# Patient Record
Sex: FEMALE | Race: WHITE | NOT HISPANIC OR LATINO | Employment: UNEMPLOYED
[De-identification: names, ages, dates, MRNs, and addresses within clinical notes are randomized per-mention and may not be internally consistent; named-entity substitution may affect disease eponyms.]

---

## 2021-04-01 ENCOUNTER — RECORDS - HEALTHEAST (OUTPATIENT)
Dept: ADMINISTRATIVE | Facility: OTHER | Age: 36
End: 2021-04-01

## 2021-04-01 ENCOUNTER — AMBULATORY - HEALTHEAST (OUTPATIENT)
Dept: MAMMOGRAPHY | Facility: CLINIC | Age: 36
End: 2021-04-01

## 2021-04-01 DIAGNOSIS — Z11.59 ENCOUNTER FOR SCREENING FOR OTHER VIRAL DISEASES: ICD-10-CM

## 2021-04-07 ENCOUNTER — COMMUNICATION - HEALTHEAST (OUTPATIENT)
Dept: SCHEDULING | Facility: CLINIC | Age: 36
End: 2021-04-07

## 2021-04-09 ENCOUNTER — AMBULATORY - HEALTHEAST (OUTPATIENT)
Dept: NURSING | Facility: CLINIC | Age: 36
End: 2021-04-09

## 2021-04-09 ENCOUNTER — OFFICE VISIT - HEALTHEAST (OUTPATIENT)
Dept: FAMILY MEDICINE | Facility: CLINIC | Age: 36
End: 2021-04-09

## 2021-04-09 ENCOUNTER — AMBULATORY - HEALTHEAST (OUTPATIENT)
Dept: LAB | Facility: CLINIC | Age: 36
End: 2021-04-09

## 2021-04-09 DIAGNOSIS — Z11.59 ENCOUNTER FOR SCREENING FOR OTHER VIRAL DISEASES: ICD-10-CM

## 2021-04-09 DIAGNOSIS — N63.10 BREAST MASS, RIGHT: ICD-10-CM

## 2021-04-10 LAB
SARS-COV-2 PCR COMMENT: NORMAL
SARS-COV-2 RNA SPEC QL NAA+PROBE: NEGATIVE
SARS-COV-2 VIRUS SPECIMEN SOURCE: NORMAL

## 2021-04-11 ENCOUNTER — COMMUNICATION - HEALTHEAST (OUTPATIENT)
Dept: SCHEDULING | Facility: CLINIC | Age: 36
End: 2021-04-11

## 2021-04-13 ENCOUNTER — HOSPITAL ENCOUNTER (OUTPATIENT)
Dept: MAMMOGRAPHY | Facility: CLINIC | Age: 36
Discharge: HOME | End: 2021-04-13

## 2021-04-13 ENCOUNTER — AMBULATORY - HEALTHEAST (OUTPATIENT)
Dept: SURGERY | Facility: CLINIC | Age: 36
End: 2021-04-13

## 2021-04-13 DIAGNOSIS — N63.0 BREAST MASS: ICD-10-CM

## 2021-04-15 ENCOUNTER — COMMUNICATION - HEALTHEAST (OUTPATIENT)
Dept: ONCOLOGY | Facility: CLINIC | Age: 36
End: 2021-04-15

## 2021-04-15 LAB
CAP COMMENT: ABNORMAL
LAB AP CHARGES (HE HISTORICAL CONVERSION): ABNORMAL
LAB AP CHARGES (HE HISTORICAL CONVERSION): ABNORMAL
LAB AP INITIAL CYTO EVAL (HE HISTORICAL CONVERSION): ABNORMAL
LAB MED GENERAL PATH INTERP (HE HISTORICAL CONVERSION): ABNORMAL
PATH REPORT.COMMENTS IMP SPEC: ABNORMAL
PATH REPORT.FINAL DX SPEC: ABNORMAL
PATH REPORT.FINAL DX SPEC: ABNORMAL
PATH REPORT.GROSS SPEC: ABNORMAL
PATH REPORT.MICROSCOPIC SPEC OTHER STN: ABNORMAL
PATH REPORT.RELEVANT HX SPEC: ABNORMAL
PATH REPORT.RELEVANT HX SPEC: ABNORMAL
RESULT FLAG (HE HISTORICAL CONVERSION): ABNORMAL
SPECIMEN DESCRIPTION: ABNORMAL

## 2021-04-16 ENCOUNTER — COMMUNICATION - HEALTHEAST (OUTPATIENT)
Dept: ONCOLOGY | Facility: CLINIC | Age: 36
End: 2021-04-16

## 2021-04-20 ENCOUNTER — HOSPITAL ENCOUNTER (OUTPATIENT)
Dept: SURGERY | Facility: CLINIC | Age: 36
Discharge: HOME | End: 2021-04-20
Attending: SPECIALIST

## 2021-04-20 DIAGNOSIS — Z17.0 MALIGNANT NEOPLASM OF AXILLARY TAIL OF RIGHT BREAST IN FEMALE, ESTROGEN RECEPTOR POSITIVE (H): ICD-10-CM

## 2021-04-20 DIAGNOSIS — C50.611 MALIGNANT NEOPLASM OF AXILLARY TAIL OF RIGHT BREAST IN FEMALE, ESTROGEN RECEPTOR POSITIVE (H): ICD-10-CM

## 2021-04-22 ENCOUNTER — AMBULATORY - HEALTHEAST (OUTPATIENT)
Dept: INTERVENTIONAL RADIOLOGY/VASCULAR | Facility: HOSPITAL | Age: 36
End: 2021-04-22

## 2021-04-22 ENCOUNTER — OFFICE VISIT - HEALTHEAST (OUTPATIENT)
Dept: ONCOLOGY | Facility: CLINIC | Age: 36
End: 2021-04-22

## 2021-04-22 ENCOUNTER — AMBULATORY - HEALTHEAST (OUTPATIENT)
Dept: ONCOLOGY | Facility: CLINIC | Age: 36
End: 2021-04-22

## 2021-04-22 DIAGNOSIS — Z79.899 ENCOUNTER FOR MONITORING CARDIOTOXIC DRUG THERAPY: ICD-10-CM

## 2021-04-22 DIAGNOSIS — Z17.0 MALIGNANT NEOPLASM OF UPPER-INNER QUADRANT OF RIGHT BREAST IN FEMALE, ESTROGEN RECEPTOR POSITIVE (H): ICD-10-CM

## 2021-04-22 DIAGNOSIS — C50.211 MALIGNANT NEOPLASM OF UPPER-INNER QUADRANT OF RIGHT BREAST IN FEMALE, ESTROGEN RECEPTOR POSITIVE (H): ICD-10-CM

## 2021-04-22 DIAGNOSIS — Z51.11 ENCOUNTER FOR ANTINEOPLASTIC CHEMOTHERAPY: ICD-10-CM

## 2021-04-22 DIAGNOSIS — Z11.59 ENCOUNTER FOR SCREENING FOR OTHER VIRAL DISEASES: ICD-10-CM

## 2021-04-22 DIAGNOSIS — Z51.81 ENCOUNTER FOR MONITORING CARDIOTOXIC DRUG THERAPY: ICD-10-CM

## 2021-04-22 ASSESSMENT — MIFFLIN-ST. JEOR: SCORE: 1181.65

## 2021-04-23 ENCOUNTER — HOSPITAL ENCOUNTER (OUTPATIENT)
Dept: CARDIOLOGY | Facility: HOSPITAL | Age: 36
Discharge: HOME | End: 2021-04-23
Attending: INTERNAL MEDICINE

## 2021-04-23 ASSESSMENT — MIFFLIN-ST. JEOR: SCORE: 1181.65

## 2021-04-25 ENCOUNTER — AMBULATORY - HEALTHEAST (OUTPATIENT)
Dept: FAMILY MEDICINE | Facility: CLINIC | Age: 36
End: 2021-04-25

## 2021-04-25 DIAGNOSIS — Z11.59 ENCOUNTER FOR SCREENING FOR OTHER VIRAL DISEASES: ICD-10-CM

## 2021-04-26 ENCOUNTER — COMMUNICATION - HEALTHEAST (OUTPATIENT)
Dept: ONCOLOGY | Facility: CLINIC | Age: 36
End: 2021-04-26

## 2021-04-26 ENCOUNTER — AMBULATORY - HEALTHEAST (OUTPATIENT)
Dept: ONCOLOGY | Facility: CLINIC | Age: 36
End: 2021-04-26

## 2021-04-27 ENCOUNTER — COMMUNICATION - HEALTHEAST (OUTPATIENT)
Dept: SCHEDULING | Facility: CLINIC | Age: 36
End: 2021-04-27

## 2021-04-28 ENCOUNTER — HOSPITAL ENCOUNTER (OUTPATIENT)
Dept: INTERVENTIONAL RADIOLOGY/VASCULAR | Facility: HOSPITAL | Age: 36
Setting detail: RADIATION/ONCOLOGY SERIES
Discharge: STILL ADMITTED OR ADMINISTRATIVE | End: 2021-04-28
Attending: INTERNAL MEDICINE

## 2021-04-28 DIAGNOSIS — Z51.11 ENCOUNTER FOR ANTINEOPLASTIC CHEMOTHERAPY: ICD-10-CM

## 2021-04-28 DIAGNOSIS — Z17.0 MALIGNANT NEOPLASM OF UPPER-INNER QUADRANT OF RIGHT BREAST IN FEMALE, ESTROGEN RECEPTOR POSITIVE (H): ICD-10-CM

## 2021-04-28 DIAGNOSIS — C50.211 MALIGNANT NEOPLASM OF UPPER-INNER QUADRANT OF RIGHT BREAST IN FEMALE, ESTROGEN RECEPTOR POSITIVE (H): ICD-10-CM

## 2021-04-28 ASSESSMENT — MIFFLIN-ST. JEOR: SCORE: 1186.64

## 2021-04-29 ENCOUNTER — COMMUNICATION - HEALTHEAST (OUTPATIENT)
Dept: ONCOLOGY | Facility: CLINIC | Age: 36
End: 2021-04-29

## 2021-04-30 ENCOUNTER — COMMUNICATION - HEALTHEAST (OUTPATIENT)
Dept: ONCOLOGY | Facility: CLINIC | Age: 36
End: 2021-04-30

## 2021-04-30 ENCOUNTER — AMBULATORY - HEALTHEAST (OUTPATIENT)
Dept: NURSING | Facility: CLINIC | Age: 36
End: 2021-04-30

## 2021-05-05 ENCOUNTER — COMMUNICATION - HEALTHEAST (OUTPATIENT)
Dept: ONCOLOGY | Facility: CLINIC | Age: 36
End: 2021-05-05

## 2021-05-07 ENCOUNTER — COMMUNICATION - HEALTHEAST (OUTPATIENT)
Dept: ONCOLOGY | Facility: CLINIC | Age: 36
End: 2021-05-07

## 2021-05-12 ENCOUNTER — COMMUNICATION - HEALTHEAST (OUTPATIENT)
Dept: SURGERY | Facility: CLINIC | Age: 36
End: 2021-05-12

## 2021-05-13 ENCOUNTER — RECORDS - HEALTHEAST (OUTPATIENT)
Dept: ADMINISTRATIVE | Facility: OTHER | Age: 36
End: 2021-05-13

## 2021-05-13 ENCOUNTER — OFFICE VISIT - HEALTHEAST (OUTPATIENT)
Dept: ONCOLOGY | Facility: CLINIC | Age: 36
End: 2021-05-13

## 2021-05-13 ENCOUNTER — AMBULATORY - HEALTHEAST (OUTPATIENT)
Dept: INFUSION THERAPY | Facility: CLINIC | Age: 36
End: 2021-05-13

## 2021-05-13 ENCOUNTER — INFUSION - HEALTHEAST (OUTPATIENT)
Dept: INFUSION THERAPY | Facility: CLINIC | Age: 36
End: 2021-05-13

## 2021-05-13 DIAGNOSIS — C50.211 MALIGNANT NEOPLASM OF UPPER-INNER QUADRANT OF RIGHT BREAST IN FEMALE, ESTROGEN RECEPTOR POSITIVE (H): ICD-10-CM

## 2021-05-13 DIAGNOSIS — Z17.0 MALIGNANT NEOPLASM OF UPPER-INNER QUADRANT OF RIGHT BREAST IN FEMALE, ESTROGEN RECEPTOR POSITIVE (H): ICD-10-CM

## 2021-05-13 DIAGNOSIS — Z51.11 ENCOUNTER FOR ANTINEOPLASTIC CHEMOTHERAPY: ICD-10-CM

## 2021-05-13 LAB
ALBUMIN SERPL-MCNC: 4.3 G/DL (ref 3.5–5)
ALP SERPL-CCNC: 59 U/L (ref 45–120)
ALT SERPL W P-5'-P-CCNC: 15 U/L (ref 0–45)
ANION GAP SERPL CALCULATED.3IONS-SCNC: 9 MMOL/L (ref 5–18)
AST SERPL W P-5'-P-CCNC: 14 U/L (ref 0–40)
BASOPHILS # BLD AUTO: 0 THOU/UL (ref 0–0.2)
BASOPHILS NFR BLD AUTO: 0 % (ref 0–2)
BILIRUB SERPL-MCNC: 0.4 MG/DL (ref 0–1)
BUN SERPL-MCNC: 12 MG/DL (ref 8–22)
CALCIUM SERPL-MCNC: 9.4 MG/DL (ref 8.5–10.5)
CHLORIDE BLD-SCNC: 106 MMOL/L (ref 98–107)
CO2 SERPL-SCNC: 23 MMOL/L (ref 22–31)
CREAT SERPL-MCNC: 0.71 MG/DL (ref 0.6–1.1)
EOSINOPHIL # BLD AUTO: 0 THOU/UL (ref 0–0.4)
EOSINOPHIL NFR BLD AUTO: 0 % (ref 0–6)
ERYTHROCYTE [DISTWIDTH] IN BLOOD BY AUTOMATED COUNT: 12 % (ref 11–14.5)
GFR SERPL CREATININE-BSD FRML MDRD: >60 ML/MIN/1.73M2
GLUCOSE BLD-MCNC: 173 MG/DL (ref 70–125)
HCT VFR BLD AUTO: 40.6 % (ref 35–47)
HGB BLD-MCNC: 13.7 G/DL (ref 12–16)
IMM GRANULOCYTES # BLD: 0 THOU/UL
IMM GRANULOCYTES NFR BLD: 0 %
LYMPHOCYTES # BLD AUTO: 0.9 THOU/UL (ref 0.8–4.4)
LYMPHOCYTES NFR BLD AUTO: 19 % (ref 20–40)
MCH RBC QN AUTO: 29.5 PG (ref 27–34)
MCHC RBC AUTO-ENTMCNC: 33.7 G/DL (ref 32–36)
MCV RBC AUTO: 87 FL (ref 80–100)
MONOCYTES # BLD AUTO: 0.1 THOU/UL (ref 0–0.9)
MONOCYTES NFR BLD AUTO: 1 % (ref 2–10)
NEUTROPHILS # BLD AUTO: 3.9 THOU/UL (ref 2–7.7)
NEUTROPHILS NFR BLD AUTO: 79 % (ref 50–70)
PLATELET # BLD AUTO: 282 THOU/UL (ref 140–440)
PMV BLD AUTO: 11.2 FL (ref 8.5–12.5)
POTASSIUM BLD-SCNC: 3.5 MMOL/L (ref 3.5–5)
PROT SERPL-MCNC: 7.8 G/DL (ref 6–8)
RBC # BLD AUTO: 4.65 MILL/UL (ref 3.8–5.4)
SODIUM SERPL-SCNC: 138 MMOL/L (ref 136–145)
WBC: 4.9 THOU/UL (ref 4–11)

## 2021-05-22 ENCOUNTER — OFFICE VISIT - HEALTHEAST (OUTPATIENT)
Dept: FAMILY MEDICINE | Facility: CLINIC | Age: 36
End: 2021-05-22

## 2021-05-22 ENCOUNTER — COMMUNICATION - HEALTHEAST (OUTPATIENT)
Dept: SCHEDULING | Facility: CLINIC | Age: 36
End: 2021-05-22

## 2021-05-22 DIAGNOSIS — J02.9 SORE THROAT: ICD-10-CM

## 2021-05-22 LAB
DEPRECATED S PYO AG THROAT QL EIA: NORMAL
GROUP A STREP BY PCR: NORMAL
SARS-COV-2 PCR COMMENT: NORMAL
SARS-COV-2 RNA SPEC QL NAA+PROBE: NEGATIVE
SARS-COV-2 VIRUS SPECIMEN SOURCE: NORMAL

## 2021-05-23 ENCOUNTER — COMMUNICATION - HEALTHEAST (OUTPATIENT)
Dept: SCHEDULING | Facility: CLINIC | Age: 36
End: 2021-05-23

## 2021-05-25 ENCOUNTER — AMBULATORY - HEALTHEAST (OUTPATIENT)
Dept: CARE COORDINATION | Facility: CLINIC | Age: 36
End: 2021-05-25

## 2021-05-27 VITALS — WEIGHT: 124.4 LBS

## 2021-05-27 VITALS
OXYGEN SATURATION: 98 % | TEMPERATURE: 98.6 F | SYSTOLIC BLOOD PRESSURE: 137 MMHG | DIASTOLIC BLOOD PRESSURE: 75 MMHG | HEART RATE: 119 BPM

## 2021-06-01 ENCOUNTER — COMMUNICATION - HEALTHEAST (OUTPATIENT)
Dept: ONCOLOGY | Facility: CLINIC | Age: 36
End: 2021-06-01

## 2021-06-03 ENCOUNTER — INFUSION - HEALTHEAST (OUTPATIENT)
Dept: INFUSION THERAPY | Facility: CLINIC | Age: 36
End: 2021-06-03

## 2021-06-03 ENCOUNTER — AMBULATORY - HEALTHEAST (OUTPATIENT)
Dept: INFUSION THERAPY | Facility: CLINIC | Age: 36
End: 2021-06-03

## 2021-06-03 ENCOUNTER — OFFICE VISIT - HEALTHEAST (OUTPATIENT)
Dept: ONCOLOGY | Facility: CLINIC | Age: 36
End: 2021-06-03

## 2021-06-03 DIAGNOSIS — C50.011 MALIGNANT NEOPLASM OF NIPPLE OF RIGHT BREAST IN FEMALE, ESTROGEN RECEPTOR POSITIVE (H): ICD-10-CM

## 2021-06-03 DIAGNOSIS — Z51.11 ENCOUNTER FOR ANTINEOPLASTIC CHEMOTHERAPY: ICD-10-CM

## 2021-06-03 DIAGNOSIS — K52.89 NEUTROPENIC COLITIS (H): ICD-10-CM

## 2021-06-03 DIAGNOSIS — Z17.0 MALIGNANT NEOPLASM OF NIPPLE OF RIGHT BREAST IN FEMALE, ESTROGEN RECEPTOR POSITIVE (H): ICD-10-CM

## 2021-06-03 DIAGNOSIS — T45.1X5A ALOPECIA DUE TO CYTOTOXIC DRUG: ICD-10-CM

## 2021-06-03 DIAGNOSIS — L65.8 ALOPECIA DUE TO CYTOTOXIC DRUG: ICD-10-CM

## 2021-06-03 DIAGNOSIS — D70.9 NEUTROPENIC COLITIS (H): ICD-10-CM

## 2021-06-03 LAB
ALBUMIN SERPL-MCNC: 3.6 G/DL (ref 3.5–5)
ALP SERPL-CCNC: 66 U/L (ref 45–120)
ALT SERPL W P-5'-P-CCNC: 21 U/L (ref 0–45)
ANION GAP SERPL CALCULATED.3IONS-SCNC: 10 MMOL/L (ref 5–18)
AST SERPL W P-5'-P-CCNC: 13 U/L (ref 0–40)
BASOPHILS # BLD AUTO: 0 THOU/UL (ref 0–0.2)
BASOPHILS NFR BLD AUTO: 0 % (ref 0–2)
BILIRUB SERPL-MCNC: 0.3 MG/DL (ref 0–1)
BUN SERPL-MCNC: 10 MG/DL (ref 8–22)
CALCIUM SERPL-MCNC: 9.4 MG/DL (ref 8.5–10.5)
CHLORIDE BLD-SCNC: 103 MMOL/L (ref 98–107)
CO2 SERPL-SCNC: 23 MMOL/L (ref 22–31)
CREAT SERPL-MCNC: 0.67 MG/DL (ref 0.6–1.1)
EOSINOPHIL # BLD AUTO: 0 THOU/UL (ref 0–0.4)
EOSINOPHIL NFR BLD AUTO: 0 % (ref 0–6)
ERYTHROCYTE [DISTWIDTH] IN BLOOD BY AUTOMATED COUNT: 13 % (ref 11–14.5)
GFR SERPL CREATININE-BSD FRML MDRD: >60 ML/MIN/1.73M2
GLUCOSE BLD-MCNC: 185 MG/DL (ref 70–125)
HCT VFR BLD AUTO: 35 % (ref 35–47)
HGB BLD-MCNC: 11.6 G/DL (ref 12–16)
IMM GRANULOCYTES # BLD: 0.1 THOU/UL
IMM GRANULOCYTES NFR BLD: 1 %
LYMPHOCYTES # BLD AUTO: 0.9 THOU/UL (ref 0.8–4.4)
LYMPHOCYTES NFR BLD AUTO: 9 % (ref 20–40)
MCH RBC QN AUTO: 29.7 PG (ref 27–34)
MCHC RBC AUTO-ENTMCNC: 33.1 G/DL (ref 32–36)
MCV RBC AUTO: 90 FL (ref 80–100)
MONOCYTES # BLD AUTO: 0.1 THOU/UL (ref 0–0.9)
MONOCYTES NFR BLD AUTO: 1 % (ref 2–10)
NEUTROPHILS # BLD AUTO: 8.9 THOU/UL (ref 2–7.7)
NEUTROPHILS NFR BLD AUTO: 90 % (ref 50–70)
PLATELET # BLD AUTO: 365 THOU/UL (ref 140–440)
PMV BLD AUTO: 10 FL (ref 8.5–12.5)
POTASSIUM BLD-SCNC: 4.1 MMOL/L (ref 3.5–5)
PROT SERPL-MCNC: 7.4 G/DL (ref 6–8)
RBC # BLD AUTO: 3.91 MILL/UL (ref 3.8–5.4)
SODIUM SERPL-SCNC: 136 MMOL/L (ref 136–145)
WBC: 9.9 THOU/UL (ref 4–11)

## 2021-06-05 VITALS — HEIGHT: 60 IN | BODY MASS INDEX: 25.11 KG/M2 | WEIGHT: 127.9 LBS

## 2021-06-05 VITALS — BODY MASS INDEX: 25.32 KG/M2 | WEIGHT: 129 LBS | HEIGHT: 60 IN

## 2021-06-05 VITALS — WEIGHT: 133 LBS

## 2021-06-08 ENCOUNTER — COMMUNICATION - HEALTHEAST (OUTPATIENT)
Dept: ONCOLOGY | Facility: CLINIC | Age: 36
End: 2021-06-08

## 2021-06-16 ENCOUNTER — COMMUNICATION - HEALTHEAST (OUTPATIENT)
Dept: ONCOLOGY | Facility: CLINIC | Age: 36
End: 2021-06-16

## 2021-06-16 DIAGNOSIS — K76.9 LIVER LESION: ICD-10-CM

## 2021-06-16 PROBLEM — R50.9 FEVER AND CHILLS: Status: ACTIVE | Noted: 2021-05-22

## 2021-06-16 PROBLEM — N63.10 BREAST MASS, RIGHT: Status: ACTIVE | Noted: 2021-04-09

## 2021-06-16 PROBLEM — D70.9 NEUTROPENIC SEPSIS (H): Status: ACTIVE | Noted: 2021-05-23

## 2021-06-16 PROBLEM — A41.9 NEUTROPENIC SEPSIS (H): Status: ACTIVE | Noted: 2021-05-23

## 2021-06-16 PROBLEM — Z51.11 ENCOUNTER FOR ANTINEOPLASTIC CHEMOTHERAPY: Status: ACTIVE | Noted: 2021-04-22

## 2021-06-16 NOTE — PROGRESS NOTES
Per Breast Radiologist request, scheduled patient to see Dr. Perdomo  for a surgical consult on 4-20-21 at 3:20 pm at the [facility] .

## 2021-06-16 NOTE — PROGRESS NOTES
This is a 36 y.o.  female who I'm asked to see by Ye Lira MD for evaluation of a right breast cancer.  This was picked up by the patient.  She was referred for mammogram and ultrasound.  A needle biopsy was done which shows an invasive ductal carcinoma.  It is estrogen receptor positive, progesterone receptor positive and HER-2 positive.  An axillary lymph node was also biopsied to be positive.    She has no significant family history of breast cancer.      PAST MEDICAL HISTORY:  No past medical history on file.  Past Surgical History:   Procedure Laterality Date      SECTION      x two     US BIOPSY FINE NEEDLE ASPIRATION LYMPH NODE (BREAST) RIGHT Right 2021     US BREAST CORE BIOPSY RIGHT Right 2021       Medications:  No current outpatient medications on file.    Allergies:  No Known Allergies    Social History:   reports that she has never smoked. She has never used smokeless tobacco. She reports that she does not drink alcohol.   The patient is a recent immigrant from [place].    ROS:  A 12 point comprehensive review of systems was negative except as noted.    Physical Exam  Resp 16   Wt 133 lb (60.3 kg)   Breastfeeding No     General:alert, appears stated age and cooperative  Lungs:clear to auscultation bilaterally  CV:regular rate and rhythm, S1, S2 normal, no murmur, click, rub or gallop  Breasts: Clearly palpable mass in the lateral aspect of the right breast, just underneath the skin.  The skin itself even is a little bit puckered over it so may be involved.  Measures at least 2 cm in size.  No other palpable masses.  Lymph Nodes: Clearly palpable lymph node in the right axilla.  Does not feel matted.  Neuro:Grossly normal  Musculoskeletal: Normal range of motion of her upper extremities.  Skin: Normal skin turgor.    Reviewed her mammograms and ultrasound and pathology.     Impression: Right Breast Cancer. Clinically T2, N1.  Discussed the surgical options for treatment of  breast cancer which generally are a lumpectomy (partial mastectomy) combined with radiation versus a mastectomy.  Explained that the survival benefit is the same for both.  The difference is in local recurrence risk.  The patient is a Reasonable candidate for a lumpectomy.  However, because this is HER-2 positive, I think that she should do neoadjuvant chemotherapy.  Particularly since she has a positive lymph node.  I would recommend that we place a port so that she can start chemotherapy.  We will then discuss surgery again after we see how she responds to the chemotherapy.  She may be a candidate for sentinel lymph node biopsy if she has a good response to the chemotherapy.  The complicating factor in this patient's case is the fact that she is not currently a resident of the [place].  She is applying for asylum.  The social workers are working on getting her emergency medical assistance.  She was very disappointed to hear that this is a fairly long treatment plan.  She was hoping to be able to just have surgery and go home.  I explained that without chemotherapy, her risk of recurrence is exceedingly high.      Plan:   She has an appointment with Dr. Sanchez later this week.  We will make sure she agrees with this plan.  She then needs a port placed which I would recommend just be placed in radiology.  Should be able to be done much more expeditiously that way.  She would then follow-up with me after about the fourth dose of chemotherapy so that we can make a surgical plan.  If we can get this tumor to shrink down, she then may be a very good candidate for a lumpectomy and then we could potentially do a sentinel lymph node biopsy.

## 2021-06-16 NOTE — PROGRESS NOTES
Joanne presents to [facility] for a surgical consult with Dr. Perdomo  regarding her newly diagnosed right breast triple positive cancer.  She is accompanied by her friend, who speaks Greenlandic, and a telephone  for consult. Patient said she was here visiting and discovered the lump.  She is a , she has two children, daughters ages 12 and 9, who are back at home in her native country, staying with her sister. RN assessment and EMR update. Resp 16   Wt 133 lb (60.3 kg)   Breastfeeding No   Patient given a Breast Cancer Packet, contents reviewed, including information on Her2 + breast cancers.  Her friend, Yumiko, offered to translate this written material for her.  She met with Dr. Perdomo  see dictation for details of visit. She will plan to meet with Dr. Sanchez on 4-22-21.  Support provided, invited calls.  RN time 20 mins.

## 2021-06-16 NOTE — TELEPHONE ENCOUNTER
Records in Epic Chart Review / Radiology images in Nil.    Notes  4/20/2021 - Patient scheduled for Consult with Dr. Perdomo.  4/9/2021 - Progress Notes / Family Medicine, Dr. Ye Lira.  4/1/2021 - ED Provider Notes / Breast mass, Yanira Johnson PA-C.    Labs  4/13/2021 - Surgical Pathology / Right Breast Core Biopsy.  4/13/2021 - Medical Cytology / FNA Lymph Node.  4/1/2021 - ED labs.    Imaging  4/13/2021 - Mammo Post Clip Placement Right.  4/13/2021 - US Right Breast Core Biopsy.  4/13/2021 - US Right Breast FNA Lymph Node.   4/13/2021 - Mammo Diagnostic Bilateral.  4/1/2021 - US Right Breast Limited 1-3 Quadrants.

## 2021-06-16 NOTE — TELEPHONE ENCOUNTER
"New Patient Oncology Nurse Navigator Note     Referring provider: Dr. Perdomo (Verbal Order from LINA Arce RN)     Referring Clinic/Organization: [facility]     Referred to (specialty): Medical Oncology    Requested provider (if applicable): N/A     Date Referral Received: 4/15/2021     Evaluation for : Right Breast Cancer     Clinical History (per Nurse review of records provided):    -Reported to the ER on 4/1 with a 5 day history painful right breast mass. US was completed while at ER and showed right breast mass and righr axillary LN suspicious for malignancy. She was referred for mammogram and biopsy as outpatient which was completed on 4/13/2021 at [facility]. Mammogram and US showed,\"Mass the right breast is highly suspicious for malignancy with abnormal-appearing lymph node in the right axilla suspicious for metastatic disease. Ultrasound-guided core biopsy is recommended. \"  -Biopsy of the right breast mass completed on 4/13 and pathology revealed  IDC, ER/DC HER-2+ (triple positive) and Right axillary LN was positive for metastatic carcinoma, compatible with breast primary.   -She is scheduled at the [facility] on 4/20/2021 for surgical consult with Dr. Perdomo.  -Writer spoke with Breast Center Staff about wishes for medical oncology consult in light of the Her-2 positive tumor biology and this was recommended. Medical Oncology Consult was scheduled with Dr. Sanchez on 4/22/2021.  -She did establish care with PCP, Dr. Lira, at [facility], on 4/9/2021  -Joanne is from [place] and speaks Syriac. She [place] the [place] staying with family for the last few weeks. She reports that she does not have a previous cancer history and shared that she does not have any other significant medical history. Other medical records are out of the country.      Clinical Assessment / Barriers to Care (Per Nurse):   Language-Speaks Setswana and " needs  services. Has english speaking friend that is a contact for her. Also has English speaking family.  Insurance-Has only been in the [place] for a short time. Have sent a message to  MADDIE to help collaborate on  case for guidance and anricipated extra needed support      Records Location (Care Everywhere, Media, etc.): [facility]  Other medical records are not in this country     Records Needed: see above     Additional testing needed prior to consult: Surgical Consult on 4/20    Called Joanne with the assistance of Georgian  [phone] to follow up and schedule new medical oncology consult as ordered by Dr. Perdomo (verbal order via nurseCristy L.) Writer called Joanne to review her breast and LN biopsy results and shared rationale for medical oncology consult. See separate note for pathology call. Appointment with Dr. Sanchez  was scheduled on Thursday 4/22/2021 , check in at 1030 at [facility]. Patient will arrive wearing a face covering and voiced understanding of the visitor restriction in place due to the pandemic. She understands that she may have one person come to clinic with her and that she will also have an . New patient letter/map with appointment details, health history form to complete sent to mailing address in Epic. She has an English speaking friend that will help her complete her paperwork. [place] in the [place] a few [place] and noted the breast mass while here. She has family here and is staying with them. Nurse navigator role was introduced. She has writer's contact information for future correspondence. We plan to review supportive resources after she is at clinic for oncology consult.

## 2021-06-16 NOTE — PROGRESS NOTES
[facility] Hematology and Oncology Consult Note    Patient: Joanne Colon  MRN: [id number]  Date of Service: 04/22/2021        Reason for Visit    I was consulted by Dr. Lira regarding right breast cancer    Assessment/Plan    #. cT2 N1 M0 invasive ductal carcinoma, grade 3, ER positive, RI positive, HER-2 positive.   I reviewed the imaging, pathology results and informed the patient that she has locally advanced breast cancer.  I discussed about treatment goal of cure with high risk of relapse.  I discussed the indication of chemotherapy along with HER-2 directed agent.  I agree with neoadjuvant approach that she can be a candidate for breast conserving surgery.  In addition to that, we can assess the response to chemotherapy and potential modification of adjuvant treatment option.  I discussed the overall schema with neoadjuvant chemotherapy (docetaxel, carboplatin, trastuzumab,pertuzumab (every 3 weeks for 6 cycles, followed by surgery.  Then to continue trastuzumab/Pertuzumab to complete 1 year if she achieves complete response or modify to TDM 1 if there is residual disease.  She will need adjuvant radiation and endocrine therapy after the surgery.  She is willing to consider neoadjuvant chemotherapy.  I reviewed the side effects of each drug and gave her information to review.   She will need a port placement.  Requested interventional radiology for port placement.   In the meantime, she will be working with our  and financial counselor emergency medical assistance.  I have communicated with them as well.  To my knowledge, I will have to submit the care plan and initiate the treatment for EMA care plan.     We will plan to start chemotherapy as soon as we can.      #. Social Determinants of Health/Other Risk Factors:  comorbidities affecting current problem were discussed specifically lack of health insurance, language barrier and early relapse.    ECOG Performance   ECOG Performance Status:  0  Distress Assessment  Distress Assessment Score: 3        Problem List    1. Malignant neoplasm of upper-inner quadrant of right breast in female, estrogen receptor positive (H)  IR Port Placement 5+ Years    Education (Chemo Class)    dexAMETHasone (DECADRON) 4 MG tablet    prochlorperazine (COMPAZINE) 10 MG tablet    Echo Complete   2. Encounter for antineoplastic chemotherapy  IR Port Placement 5+ Years    Education (Chemo Class)    dexAMETHasone (DECADRON) 4 MG tablet    prochlorperazine (COMPAZINE) 10 MG tablet   3. Encounter for monitoring cardiotoxic drug therapy  Echo Complete           CC: Ye Lira MD      ______________________________________________________________________________      Staging History    Cancer Staging  Malignant neoplasm of upper-inner quadrant of right breast in female, estrogen receptor positive (H)  Staging form: Breast, AJCC 8th Edition  - Clinical stage from 4/21/2021: Stage IB (cT2, cN1(f), cM0, G3, ER+, AK+, HER2+) - Signed by Chelly Sanchez MD on 4/24/2021      History    Ms. Joanne Colon is a very pleasant 36 y.o. female presented today accompanied by her friend, Carey. She speaks Vietnamese. She declined  and she requests her friend to interpret for her.     She reported noticing a mass in her right breast along with pain for about 3 months or so.  Denies nipple changes or discharge.  Underwent diagnostic mammogram and ultrasound on 4/13/2021 and it confirmed right breast mass in the 8 o'clock position, 5 cm from the nipple of 2.2 x 2.8 x 1.4 cm with abnormal appearing right axillary lymph node of 1.7 cm with cortical thickness.  She underwent ultrasound-guided core needle biopsy on the same day and it showed invasive ductal carcinoma, grade 3, ER moderate to strong positive, AK strongly positive, HER-2 positive by IHC.  She met with Dr. Perdomo from surgery and recommended to consider neoadjuvant chemotherapy to HER-2 positive lymph node positive  disease.    She is otherwise very healthy.  No prior exposure to radiation or chemotherapy.  No chronic medical illness.  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  She is .  Her   about 8 years ago at the age of 30 from a heart attack.  She has 2 children of 12 years old and 9 years old.  She just came to [place] from [place] and seeking asylum here.  Her children are currently with her sister in [place].  She lives with her another sister here.    Never smoker.  She does not drink alcohol.    Menarche at age 14.  LMP 2021.  G3, P2.  Age at first pregnancy was 23.    No known family history of cancer.    Past History  History reviewed. No pertinent past medical history.  Past Surgical History:   Procedure Laterality Date      SECTION      x two     US BIOPSY FINE NEEDLE ASPIRATION LYMPH NODE (BREAST) RIGHT Right 2021     US BREAST CORE BIOPSY RIGHT Right 2021     Family History   Problem Relation Age of Onset     Diabetes type II Mother      Diabetes type II Father      Breast cancer Neg Hx      Social History     Socioeconomic History     Marital status:      Spouse name: None     Number of children: 2     Years of education: None     Highest education level: None   Occupational History     Occupation: Not working   Social Needs     Financial resource strain: None     Food insecurity     Worry: None     Inability: None     Transportation needs     Medical: None     Non-medical: None   Tobacco Use     Smoking status: Never Smoker     Smokeless tobacco: Never Used   Substance and Sexual Activity     Alcohol use: Not Currently     Frequency: Never     Binge frequency: Never     Drug use: Not Currently     Sexual activity: Not Currently   Lifestyle     Physical activity     Days per week: None     Minutes per session: None     Stress: None   Relationships     Social connections     Talks on phone: None     Gets together: None     Attends Protestant service: None     Active  member of club or organization: None     Attends meetings of clubs or organizations: None     Relationship status: None     Intimate partner violence     Fear of current or ex partner: None     Emotionally abused: None     Physically abused: None     Forced sexual activity: None   Other Topics Concern     None   Social History Narrative    Diet-        Exercise-        [place]     Allergies    No Known Allergies    Review of Systems    General  General (WDL): All general elements are within defined limits  ENT  ENT (WDL): All ENT elements are within defined limits  Respiratory  Respiratory (WDL): All respiratory elements are within defined limits  Cardiovascular  Cardiovascular (WDL): All cardiovascular elements are within defined limits  Endocrine  Endocrine (WDL): All endocrine elements are within defined limits  Gastrointestinal  Gastrointestinal (WDL): All gastrointestinal elements are within defined limits  Musculoskeletal  Musculoskeletal (WDL): All musculoskeletal elements are within defined limits  Neurological  Neurological (WDL): All neurological elements are within defined limits  Dominant Hand: Right  Psychological/Emotional  Psychological/Emotional (WDL): All psychological/emotional elements are within defined limits  Hematological/Lymphatic  Hematological/Lymphatic (WDL): All hematological/lymphatic elements are within defined limits  Dermatological  Dermatologic (WDL): All dermatological elements are within defined limits  Genitourinary/Reproductive  Genitourinary/Reproductive (WDL): All genitourinary/reproductive elements are within defined limits  Reproductive (Females only)  Menstrual irritation or increase in discharge: No  Age at start of periods: 14   Last menstural cycle: 3/13/21  Number of pregnancies: 3  Age at first pregnancy: 23  Patient Pregnant?: No  Is the patient trying to get pregnant?: No  Is the patient on birth control: No  Pain  Currently in Pain: Yes  Pain Frequency:  "Intermittent  Location: R breast  Pain Characteristics : Other (Comment)(\"like needles poking\")  Pain Intervention(s): Repositioned  Response to Interventions: improved    Physical Exam    Recent Vitals 4/23/2021   Height 5' 0\"   Weight 127 lbs 14 oz   BSA (m2) 1.57 m2   /74   Pulse 76   Temp -   Temp src -   SpO2 -   Some recent data might be hidden       General: alert, awake, not in acute distress  HEENT: Head: Normal, normocephalic, atraumatic.  Eye: Normal external eye, conjunctiva, lids cornea, LISSETH.  Nose: Normal external nose, mucus membranes and septum.  Pharynx: Normal buccal mucosa. Normal pharynx.  Neck / Thyroid: Supple, no masses, nodes, nodules or enlargement.  Lymphatics: No abnormally enlarged lymph nodes.  Chest: Normal chest wall and respirations. Clear to auscultation.  Breasts: about 4.5 cm fixed firm mass in the UOQ of the right breast.  Heart: S1 S2 RRR, no murmur.   Abdomen: abdomen is soft without significant tenderness, masses, organomegaly or guarding  Extremities: normal strength, tone, and muscle mass  Skin: normal. no rash or abnormalities  CNS: non focal.    Lab Results    Recent Results (from the past 168 hour(s))   Echo Complete   Result Value Ref Range    LV volume diastolic 51.5 46.0 - 106.0 cm3    LV volume systolic 17.4 14.0 - 42.0 cm3    IVSd 0.639 0.6 - 0.9 cm    LVIDd 4.06 3.8 - 5.2 cm    LVIDs 2.76 2.2 - 3.5 cm    LVOT diam 1.8 cm    LVOT mean gradient 2 mmHg    LVOT peak VTI 19.6 cm    LVOT mean gulshan 59.5 cm/s    LVOT peak gulshan 93.8 cm/s    LVOT peak gradient 4 mmHg    LV PWd 0.671 0.6 - 0.9 cm    MV E' lat gulshan 17.5 cm/s    MV E' med gulshan 11.1 cm/s    AV mean gulshan 73.7 cm/s    AV mean gradient 3 mmHg    AV VTI 24 cm    AV peak gulshan 117 cm/s    AO root 2.4 cm    AO ascending 2.4 cm    LA size 2.8 cm    LA length 4.4 cm    MV decel slope 4,530 mm/s2    MV decel time 173 ms    MV P 1/2 time 51 ms    MV peak A gulshan 47.1 cm/s    MV peak E gulshan 78.1 cm/s    PV accel time 155 ms    " LA area 2 11.2 cm2    LA area 1 12.4 cm2    IVS/PW ratio 1.0     LV FS 32.0 28.0 - 44.0 %    Echo LVEF calculated 66 55 - 75 %    LV mass 73.8 g    AV area 2.1 cm2    MV area p 1/2 time 4.3 cm2    LVOT area 2.54 cm2    LVOT SV 49.9 cm3    AV DIM IND VTI 0.8     BSA 1.57 m2    Hieght 60 in    Weight 2,046.4 lbs    /74 mmHg    HR 76 bpm    IVC proximal 1.4 cm    LA volume 26.8 mL    RVIDd 2.5 cm    AV DIM IND gulshan 0.8     MV E/A Ratio 1.7     AV peak gradient 5.5 mmHg    LV systolic volume index 11.1 8.0 - 24.0 cm3/m2    LV diastolic volume index 32.8 29.0 - 61.0 cm3/m2    LA volume index 17.1 mL/m2    LV mass index 47.0 g/m2    LV SVi 31.8 ml/m2    TAPSE 1.9 cm    MV med E/e' ratio 7.0     MV lat E/e' ratio 4.5     LV CO 3.8 l/min    LV Ci 2.4 l/min/m2    PV decel time 155.0 msec    Height 60.0 in    Weight 128 lbs    MV Avg E/e' Ratio 5.5 cm/s       Imaging Results    Echo Complete    Result Date: 4/23/2021  1. Normal left ventricular size and systolic performance with a visually estimated ejection fraction of 65%. 2. No significant valvular heart disease is identified on this study. 3. Normal right ventricular size and systolic performance.     Mammo Diagnostic Bilateral    Result Date: 4/13/2021  EXAM: MAMMO DIAGNOSTIC 3D BILATERAL, US BREAST BILATERAL LIMITED 1-3 QUADRANTS LOCATION: [facility] SERVICES  [facility] DATE/TIME: 4/13/2021 1:32 PM INDICATION: Lump in the right breast. COMPARISON: 4/1/2021. MAMMOGRAPHIC FINDINGS: Bilateral full-field digital diagnostic mammograms performed. The breasts are heterogeneously dense, which may obscure small masses. Images evaluated with the assistance of CAD. Breast tomosynthesis was used in interpretation. Multiple partially circumscribed and partially obscured masses are seen in both breasts. A few scattered benign-appearing round calcifications are also seen in the breasts. A mass with spiculated margins is seen just deep to the BB in the right breast in   the 9:00 position. It is suspicious for malignancy and correlates with the previously identified mass seen on ultrasound. ULTRASOUND FINDINGS: Targeted bilateral breast ultrasound was performed by both the ultrasonographer and the radiologist. In the right breast in the 9:00 position 5 cm from the nipple there was a hypoechoic heterogeneous mass with microlobulated margins,  correlating with the palpable abnormality and the mass seen in the right breast on mammography, and highly suspicious for malignancy. It measured 2.2 x 2.8 x 1.4 cm. An abnormal-appearing lymph node was also seen in the right axilla measuring 1.7 x 0.8 x 2.3 cm. The cortex appeared thickened to approximately 8 mm in thickness. Innumerable benign cysts were seen in the upper outer quadrants of both breasts. The largest in the left measured 2.9 cm in greatest dimension.     1.  Mass the right breast is highly suspicious for malignancy with abnormal-appearing lymph node in the right axilla suspicious for metastatic disease. Ultrasound-guided core biopsy is recommended. The findings and the recommendations were discussed with  the patient through an  and a friend she had who also served as an . The patient stated that she preferred to have her friend do interpreting during the biopsy. This was confirmed by the professional . Ultrasound-guided core biopsy of the breast mass and fine-needle aspiration of the right axillary lymph node was performed on the same day. 2.  Multiple benign cysts in both breasts. ACR BI-RADS Category 5: Highly Suggestive of Malignancy. Ultrasound-guided biopsy of the right breast mass and fine-needle aspiration of the right axillary lymph node was performed on the same day.    Mammo Post Clip Placement Right    Addendum Date: 4/16/2021    Pathology shows invasive ductal carcinoma. This is consistent with imaging findings. A verbal report was given to the patient. Surgical consultation is  recommended.    Result Date: 4/13/2021  US Breast Core Biopsy Right Mammo Post Clip Placement Right INDICATION: Right breast mass. PROCEDURE: Informed consent was obtained from the patient. Ultrasound was used to localize the mass at the 9 o'clock position of the right breast, 5 cm from the nipple.  The skin was marked, then prepped and draped in a sterile fashion. 1% lidocaine was used for local anesthesia. Under direct sonographic guidance, a 14-gauge coaxial needle was used to obtain 3 core biopsies.  A biopsy marking clip was then placed.  Digital mammograms performed in a separate room, using separate equipment, to document clip placement. The mammogram showed the clip to be in good position. The patient tolerated this well; there are no immediate complications. IMPRESSION: 1. Ultrasound-guided biopsy of mass in the right breast. Pathology pending. 2. Post procedure mammogram for marker placement    Us Breast Core Biopsy Right    Addendum Date: 4/16/2021    Pathology shows invasive ductal carcinoma. This is consistent with imaging findings. A verbal report was given to the patient. Surgical consultation is recommended.    Result Date: 4/13/2021  US Breast Core Biopsy Right Mammo Post Clip Placement Right INDICATION: Right breast mass. PROCEDURE: Informed consent was obtained from the patient. Ultrasound was used to localize the mass at the 9 o'clock position of the right breast, 5 cm from the nipple.  The skin was marked, then prepped and draped in a sterile fashion. 1% lidocaine was used for local anesthesia. Under direct sonographic guidance, a 14-gauge coaxial needle was used to obtain 3 core biopsies.  A biopsy marking clip was then placed.  Digital mammograms performed in a separate room, using separate equipment, to document clip placement. The mammogram showed the clip to be in good position. The patient tolerated this well; there are no immediate complications. IMPRESSION: 1. Ultrasound-guided biopsy of  mass in the right breast. Pathology pending. 2. Post procedure mammogram for marker placement    Us Biopsy Fna Lymph Node (breast) Right    Addendum Date: 4/16/2021    Pathology shows metastatic carcinoma compatible with breast primary. This is consistent with imaging findings. A verbal report was given to the patient. Surgical consultation is recommended.    Result Date: 4/13/2021  RIGHT BREAST ULTRASOUND FINE NEEDLE ASPIRATION, CLIP PLACEMENT: INDICATIONS: Abnormal Lymph Node PROCEDURE: Informed consent was obtained from the patient. Ultrasound was used to localize the lymph node in the right axilla. The skin was prepped and draped in a sterile fashion. Lidocaine was used for local anesthesia. Fine needle aspiration was performed with multiple needles under direct ultrasound guidance. The sample was deemed adequate per pathology. A clip was then placed. Digital mammograms performed in separate room, using separate equipment to document clip placement, demonstrates the clip in appropriate position for the lesion biopsied in the breast.  The lymph node is not bisible on the mammogram. The patient tolerated this well, there are no immediate complications. IMPRESSION: Ultrasound guided fine needle aspiration of a suspicious lymph node in the right axilla. Pathology pending.      Us Breast Right Limited 1-3 Quadrants    Result Date: 4/1/2021  EXAM: ULTRASOUND BREAST UNILATERAL RIGHT LIMITED LOCATION: [facility] DATE/TIME: 4/1/2021 1:01 PM INDICATION: Right lateral breast mass, concern for abscess COMPARISON: None ULTRASOUND FINDINGS: Targeted right breast ultrasound at the site of palpable lump demonstrates a 2.2 x 1.5 x 2.2 cm irregularly-shaped hypoechoic mass at the 9:00 position 5 cm from the nipple with significant internal vascularity. Findings are highly suspicious for malignancy. Survey of the right axilla demonstrates a 2.2 x 0.9 x 1.4 cm axillary lymph node with areas of eccentric  cortical thickening. No additional suspicious lymph nodes are noted.     Highly suspicious right breast mass and axillary lymph node. Further evaluation is recommended with bilateral diagnostic mammogram, right breast biopsy and right axillary lymph node biopsy. ACR BI-RADS Category 0: Incomplete: Need Additional Imaging Evaluation and/or Prior Mammograms for Comparison. Results given to the patient and discussed with Yanira Johnson in the ER.    Us Breast Bilateral Limited 1-3 Quadrants    Result Date: 4/13/2021  EXAM: MAMMO DIAGNOSTIC 3D BILATERAL, US BREAST BILATERAL LIMITED 1-3 QUADRANTS LOCATION: [facility] SERVICES  [facility] DATE/TIME: 4/13/2021 1:32 PM INDICATION: Lump in the right breast. COMPARISON: 4/1/2021. MAMMOGRAPHIC FINDINGS: Bilateral full-field digital diagnostic mammograms performed. The breasts are heterogeneously dense, which may obscure small masses. Images evaluated with the assistance of CAD. Breast tomosynthesis was used in interpretation. Multiple partially circumscribed and partially obscured masses are seen in both breasts. A few scattered benign-appearing round calcifications are also seen in the breasts. A mass with spiculated margins is seen just deep to the BB in the right breast in  the 9:00 position. It is suspicious for malignancy and correlates with the previously identified mass seen on ultrasound. ULTRASOUND FINDINGS: Targeted bilateral breast ultrasound was performed by both the ultrasonographer and the radiologist. In the right breast in the 9:00 position 5 cm from the nipple there was a hypoechoic heterogeneous mass with microlobulated margins,  correlating with the palpable abnormality and the mass seen in the right breast on mammography, and highly suspicious for malignancy. It measured 2.2 x 2.8 x 1.4 cm. An abnormal-appearing lymph node was also seen in the right axilla measuring 1.7 x 0.8 x 2.3 cm. The cortex appeared thickened to approximately 8 mm in  thickness. Innumerable benign cysts were seen in the upper outer quadrants of both breasts. The largest in the left measured 2.9 cm in greatest dimension.     1.  Mass the right breast is highly suspicious for malignancy with abnormal-appearing lymph node in the right axilla suspicious for metastatic disease. Ultrasound-guided core biopsy is recommended. The findings and the recommendations were discussed with  the patient through an  and a friend she had who also served as an . The patient stated that she preferred to have her friend do interpreting during the biopsy. This was confirmed by the professional . Ultrasound-guided core biopsy of the breast mass and fine-needle aspiration of the right axillary lymph node was performed on the same day. 2.  Multiple benign cysts in both breasts. ACR BI-RADS Category 5: Highly Suggestive of Malignancy. Ultrasound-guided biopsy of the right breast mass and fine-needle aspiration of the right axillary lymph node was performed on the same day.    TT: 65 minutes: time consisted of preparing to see the patient (eg. review of tests), Obtaining and reviewing records, ordering medication, tests and communicating with other healthcare professionals nd , documenting going to go information in the electronic medical records.     Signed by: Chelly Sanchez MD

## 2021-06-16 NOTE — PROGRESS NOTES
Per Rupesh AUGUST, SUSAN application was received by [facility] yesterday and will be marked as urgent. Once EMA coverage has been approved, EMA care plan can be submitted. He spoke with management and confirmed that we are okay to continue care. Chemo is scheduled to begin on 4/29/2021. MADDIE Brito and Dr. Sanchez's team updated.

## 2021-06-16 NOTE — TELEPHONE ENCOUNTER
Who is calling:  The patient.  Reason for Call:  Would like to speak with a care coordinator for help with insurance and other upcoming appointments.  Date of last appointment with primary care: n/a  Okay to leave a detailed message: Yes

## 2021-06-16 NOTE — PATIENT INSTRUCTIONS - HE
Pertuzumab Solution for injection  What is this medicine?  PERTUZUMAB (per VALZ ue mab) is a monoclonal antibody that targets a protein called HER2. HER2 is found in some breast cancers. This medicine can stop cancer cell growth. This medicine is used with other cancer treatments.  This medicine may be used for other purposes; ask your health care provider or pharmacist if you have questions.  What should I tell my health care provider before I take this medicine?  They need to know if you have any of these conditions:    heart disease    heart failure    high blood pressure    history of irregular heart beat    recent or ongoing radiation therapy    an unusual or allergic reaction to pertuzumab, other medicines, foods, dyes, or preservatives    pregnant or trying to get pregnant    breast-feeding  How should I use this medicine?  This medicine is for infusion into a vein. It is given by a health care professional in a hospital or clinic setting.  Talk to your pediatrician regarding the use of this medicine in children. Special care may be needed.  Overdosage: If you think you've taken too much of this medicine contact a poison control center or emergency room at once.  NOTE: This medicine is only for you. Do not share this medicine with others.  What if I miss a dose?  It is important not to miss your dose. Call your doctor or health care professional if you are unable to keep an appointment.  What may interact with this medicine?  Interactions are not expected.  Give your health care provider a list of all the medicines, herbs, non-prescription drugs, or dietary supplements you use. Also tell them if you smoke, drink alcohol, or use illegal drugs. Some items may interact with your medicine.  What should I watch for while using this medicine?  Your condition will be monitored carefully while you are receiving this medicine. Report any side effects. Continue your course of treatment even though you feel ill unless  your doctor tells you to stop.  Do not become pregnant while taking this medicine. Women should inform their doctor if they wish to become pregnant or think they might be pregnant. There is a potential for serious side effects to an unborn child. Talk to your health care professional or pharmacist for more information. Do not breast-feed an infant while taking this medicine.  Call your doctor or health care professional for advice if you get a fever, chills or sore throat, or other symptoms of a cold or flu. Do not treat yourself. Try to avoid being around people who are sick.  You may experience fever, chills, and headache during the infusion. Report any side effects during the infusion to your health care professional.  What side effects may I notice from receiving this medicine?  Side effects that you should report to your doctor or health care professional as soon as possible:    breathing problems    chest pain or palpitations    dizziness    feeling faint or lightheaded    fever or chills    skin rash, itching or hives    sore throat    swelling of the face, lips, or tongue    swelling of the legs or ankles    unusually weak or tired  Side effects that usually do not require medical attention (Report these to your doctor or health care professional if they continue or are bothersome.):    diarrhea    hair loss    nausea, vomiting    tiredness  This list may not describe all possible side effects. Call your doctor for medical advice about side effects. You may report side effects to FDA at 3-147-FDA-1929.  Where should I keep my medicine?  This drug is given in a hospital or clinic and will not be stored at home.  NOTE: This sheet is a summary. It may not cover all possible information. If you have questions about this medicine, talk to your doctor, pharmacist, or health care provider.  NOTE:This sheet is a summary. It may not cover all possible information. If you have questions about this medicine, talk to  your doctor, pharmacist, or health care provider. Copyright  2015 Gold Standard         Trastuzumab Solution for injection  What is this medicine?  TRASTUZUMAB (tras VAL zoo mab) is a monoclonal antibody. It targets a protein called HER2. This protein is found in some stomach and breast cancers. This medicine can stop cancer cell growth. This medicine may be used with other cancer treatments.  This medicine may be used for other purposes; ask your health care provider or pharmacist if you have questions.  What should I tell my health care provider before I take this medicine?  They need to know if you have any of these conditions:    heart disease    heart failure    infection (especially a virus infection such as chickenpox, cold sores, or herpes)    lung or breathing disease, like asthma    recent or ongoing radiation therapy    an unusual or allergic reaction to trastuzumab, benzyl alcohol, or other medications, foods, dyes, or preservatives    pregnant or trying to get pregnant    breast-feeding  How should I use this medicine?  This drug is given as an infusion into a vein. It is administered in a hospital or clinic by a specially trained health care professional.  Talk to your pediatrician regarding the use of this medicine in children. This medicine is not approved for use in children.  Overdosage: If you think you have taken too much of this medicine contact a poison control center or emergency room at once.  NOTE: This medicine is only for you. Do not share this medicine with others.  What if I miss a dose?  It is important not to miss a dose. Call your doctor or health care professional if you are unable to keep an appointment.  What may interact with this medicine?    cyclophosphamide    doxorubicin    warfarin  This list may not describe all possible interactions. Give your health care provider a list of all the medicines, herbs, non-prescription drugs, or dietary supplements you use. Also tell them if  you smoke, drink alcohol, or use illegal drugs. Some items may interact with your medicine.  What should I watch for while using this medicine?  Visit your doctor for checks on your progress. Report any side effects. Continue your course of treatment even though you feel ill unless your doctor tells you to stop.  Call your doctor or health care professional for advice if you get a fever, chills or sore throat, or other symptoms of a cold or flu. Do not treat yourself. Try to avoid being around people who are sick.  You may experience fever, chills and shaking during your first infusion. These effects are usually mild and can be treated with other medicines. Report any side effects during the infusion to your health care professional. Fever and chills usually do not happen with later infusions.  What side effects may I notice from receiving this medicine?  Side effects that you should report to your doctor or other health care professional as soon as possible:    breathing difficulties    chest pain or palpitations    cough    dizziness or fainting    fever or chills, sore throat    skin rash, itching or hives    swelling of the legs or ankles    unusually weak or tired  Side effects that usually do not require medical attention (report to your doctor or other health care professional if they continue or are bothersome):    loss of appetite    headache    muscle aches    nausea  This list may not describe all possible side effects. Call your doctor for medical advice about side effects. You may report side effects to FDA at 7-998-FDA-4005.  Where should I keep my medicine?  This drug is given in a hospital or clinic and will not be stored at home.  NOTE:This sheet is a summary. It may not cover all possible information. If you have questions about this medicine, talk to your doctor, pharmacist, or health care provider. Copyright  2015 Gold Standard         Docetaxel Solution for injection  What is this  medicine?  DOCETAXEL (domínguez se TAX el) is a chemotherapy drug. It targets fast dividing cells, like cancer cells, and causes these cells to die. This medicine is used to treat many types of cancers like breast cancer, certain stomach cancers, head and neck cancer, lung cancer, and prostate cancer.  This medicine may be used for other purposes; ask your health care provider or pharmacist if you have questions.  What should I tell my health care provider before I take this medicine?  They need to know if you have any of these conditions:    infection (especially a virus infection such as chickenpox, cold sores, or herpes)    liver disease    low blood counts, like low white cell, platelet, or red cell counts    an unusual or allergic reaction to docetaxel, polysorbate 80, other chemotherapy agents, other medicines, foods, dyes, or preservatives    pregnant or trying to get pregnant    breast-feeding  How should I use this medicine?  This drug is given as an infusion into a vein. It is administered in a hospital or clinic by a specially trained health care professional.  Talk to your pediatrician regarding the use of this medicine in children. Special care may be needed.  Overdosage: If you think you have taken too much of this medicine contact a poison control center or emergency room at once.  NOTE: This medicine is only for you. Do not share this medicine with others.  What if I miss a dose?  It is important not to miss your dose. Call your doctor or health care professional if you are unable to keep an appointment.  What may interact with this medicine?    cyclosporine    erythromycin    ketoconazole    medicines to increase blood counts like filgrastim, pegfilgrastim, sargramostim    vaccines  Talk to your doctor or health care professional before taking any of these medicines:    acetaminophen    aspirin    ibuprofen    ketoprofen    naproxen  This list may not describe all possible interactions. Give your health  care provider a list of all the medicines, herbs, non-prescription drugs, or dietary supplements you use. Also tell them if you smoke, drink alcohol, or use illegal drugs. Some items may interact with your medicine.  What should I watch for while using this medicine?  Your condition will be monitored carefully while you are receiving this medicine. You will need important blood work done while you are taking this medicine.  This drug may make you feel generally unwell. This is not uncommon, as chemotherapy can affect healthy cells as well as cancer cells. Report any side effects. Continue your course of treatment even though you feel ill unless your doctor tells you to stop.  In some cases, you may be given additional medicines to help with side effects. Follow all directions for their use.  Call your doctor or health care professional for advice if you get a fever, chills or sore throat, or other symptoms of a cold or flu. Do not treat yourself. This drug decreases your body's ability to fight infections. Try to avoid being around people who are sick.  This medicine may increase your risk to bruise or bleed. Call your doctor or health care professional if you notice any unusual bleeding.  Be careful brushing and flossing your teeth or using a toothpick because you may get an infection or bleed more easily. If you have any dental work done, tell your dentist you are receiving this medicine.  Avoid taking products that contain aspirin, acetaminophen, ibuprofen, naproxen, or ketoprofen unless instructed by your doctor. These medicines may hide a fever.  This medicine contains an alcohol in the product. You may get drowsy or dizzy. Do not drive, use machinery, or do anything that needs mental alertness until you know how this medicine affects you. Do not stand or sit up quickly, especially if you are an older patient. This reduces the risk of dizzy or fainting spells. Avoid alcoholic drinks  Do not become pregnant  while taking this medicine. Women should inform their doctor if they wish to become pregnant or think they might be pregnant. There is a potential for serious side effects to an unborn child. Talk to your health care professional or pharmacist for more information. Do not breast-feed an infant while taking this medicine.  What side effects may I notice from receiving this medicine?  Side effects that you should report to your doctor or health care professional as soon as possible:    allergic reactions like skin rash, itching or hives, swelling of the face, lips, or tongue    low blood counts - This drug may decrease the number of white blood cells, red blood cells and platelets. You may be at increased risk for infections and bleeding.    signs of infection - fever or chills, cough, sore throat, pain or difficulty passing urine    signs of decreased platelets or bleeding - bruising, pinpoint red spots on the skin, black, tarry stools, nosebleeds    signs of decreased red blood cells - unusually weak or tired, fainting spells, lightheadedness    breathing problems    fast or irregular heartbeat    low blood pressure    mouth sores    nausea and vomiting    pain, swelling, redness or irritation at the injection site    pain, tingling, numbness in the hands or feet    swelling of the ankle, feet, hands    weight gain  Side effects that usually do not require medical attention (report to your prescriber or health care professional if they continue or are bothersome):    bone pain    complete hair loss including hair on your head, underarms, pubic hair, eyebrows, and eyelashes    diarrhea    excessive tearing    changes in the color of fingernails    loosening of the fingernails    nausea    muscle pain    red flush to skin    sweating    weak or tired  This list may not describe all possible side effects. Call your doctor for medical advice about side effects. You may report side effects to FDA at  1-800-FDA-1088.  Where should I keep my medicine?  This drug is given in a hospital or clinic and will not be stored at home.  NOTE:This sheet is a summary. It may not cover all possible information. If you have questions about this medicine, talk to your doctor, pharmacist, or health care provider. Copyright  2015 Gold Standard         Carboplatin Solution for injection  What is this medicine?  CARBOPLATIN (ANGELO lorenzo jacy tin) is a chemotherapy drug. It targets fast dividing cells, like cancer cells, and causes these cells to die. This medicine is used to treat ovarian cancer and many other cancers.  This medicine may be used for other purposes; ask your health care provider or pharmacist if you have questions.  What should I tell my health care provider before I take this medicine?  They need to know if you have any of these conditions:    blood disorders    hearing problems    kidney disease    recent or ongoing radiation therapy    an unusual or allergic reaction to carboplatin, cisplatin, other chemotherapy, other medicines, foods, dyes, or preservatives    pregnant or trying to get pregnant    breast-feeding  How should I use this medicine?  This drug is usually given as an infusion into a vein. It is administered in a hospital or clinic by a specially trained health care professional.  Talk to your pediatrician regarding the use of this medicine in children. Special care may be needed.  Overdosage: If you think you have taken too much of this medicine contact a poison control center or emergency room at once.  NOTE: This medicine is only for you. Do not share this medicine with others.  What if I miss a dose?  It is important not to miss a dose. Call your doctor or health care professional if you are unable to keep an appointment.  What may interact with this medicine?    medicines for seizures    medicines to increase blood counts like filgrastim, pegfilgrastim, sargramostim    some antibiotics like amikacin,  gentamicin, neomycin, streptomycin, tobramycin    vaccines  Talk to your doctor or health care professional before taking any of these medicines:    acetaminophen    aspirin    ibuprofen    ketoprofen    naproxen  This list may not describe all possible interactions. Give your health care provider a list of all the medicines, herbs, non-prescription drugs, or dietary supplements you use. Also tell them if you smoke, drink alcohol, or use illegal drugs. Some items may interact with your medicine.  What should I watch for while using this medicine?  Your condition will be monitored carefully while you are receiving this medicine. You will need important blood work done while you are taking this medicine.  This drug may make you feel generally unwell. This is not uncommon, as chemotherapy can affect healthy cells as well as cancer cells. Report any side effects. Continue your course of treatment even though you feel ill unless your doctor tells you to stop.  In some cases, you may be given additional medicines to help with side effects. Follow all directions for their use.  Call your doctor or health care professional for advice if you get a fever, chills or sore throat, or other symptoms of a cold or flu. Do not treat yourself. This drug decreases your body's ability to fight infections. Try to avoid being around people who are sick.  This medicine may increase your risk to bruise or bleed. Call your doctor or health care professional if you notice any unusual bleeding.  Be careful brushing and flossing your teeth or using a toothpick because you may get an infection or bleed more easily. If you have any dental work done, tell your dentist you are receiving this medicine.  Avoid taking products that contain aspirin, acetaminophen, ibuprofen, naproxen, or ketoprofen unless instructed by your doctor. These medicines may hide a fever.  Do not become pregnant while taking this medicine. Women should inform their doctor if  they wish to become pregnant or think they might be pregnant. There is a potential for serious side effects to an unborn child. Talk to your health care professional or pharmacist for more information. Do not breast-feed an infant while taking this medicine.  What side effects may I notice from receiving this medicine?  Side effects that you should report to your doctor or health care professional as soon as possible:    allergic reactions like skin rash, itching or hives, swelling of the face, lips, or tongue    signs of infection - fever or chills, cough, sore throat, pain or difficulty passing urine    signs of decreased platelets or bleeding - bruising, pinpoint red spots on the skin, black, tarry stools, nosebleeds    signs of decreased red blood cells - unusually weak or tired, fainting spells, lightheadedness    breathing problems    changes in hearing    changes in vision    chest pain    high blood pressure    low blood counts - This drug may decrease the number of white blood cells, red blood cells and platelets. You may be at increased risk for infections and bleeding.    nausea and vomiting    pain, swelling, redness or irritation at the injection site    pain, tingling, numbness in the hands or feet    problems with balance, talking, walking    trouble passing urine or change in the amount of urine  Side effects that usually do not require medical attention (report to your doctor or health care professional if they continue or are bothersome):    hair loss    loss of appetite    metallic taste in the mouth or changes in taste  This list may not describe all possible side effects. Call your doctor for medical advice about side effects. You may report side effects to FDA at 9-582-FDA-8472.  Where should I keep my medicine?  This drug is given in a hospital or clinic and will not be stored at home.  NOTE:This sheet is a summary. It may not cover all possible information. If you have questions about this  medicine, talk to your doctor, pharmacist, or health care provider. Copyright  2015 Gold Standard

## 2021-06-16 NOTE — TELEPHONE ENCOUNTER
With the assistance of [employer]  [id number], called  Joanne to follow up regarding the positive results of her right breast and right lymph node biopsy performed at  on 4/13/2021. Reviewed pathology report, receptor status, anatomy of the breast and the next steps.. She had already been scheduled for a surgical consult with Dr. Perdomo when she was at the [facility] for her biopsy. Consult was scheduled on 4/20/2021 at [facility], check in at 3:40 p.m.. Appointment details were previously given to her. Due to there Her-2+ receptor study, writer spoke with Dr. Perdomo's office and shared that she would like her to meet with a Medical Oncologist about treatment options as well. She was scheduled with Dr. Sanchez on Thursday, 4/22 2021, check in at 10:30 a.m at [facility]. Appointment details reviewed. Support and reassurance provided. Writer's direct number provided for any questions or concerns.

## 2021-06-16 NOTE — PROGRESS NOTES
ASSESSMENT:  1. Breast mass, right  Patient with a recent diagnosis of a right breast mass, which is deemed suspicious.  - Ambulatory referral to General Surgery        PLAN:  1.  Patient is scheduled for upcoming diagnostic mammogram, right breast biopsy on April 13.  2.  Referral to general surgery.  3.  Patient to begin the Covid vaccine series today.       Orders Placed This Encounter   Procedures     Ambulatory referral to General Surgery     Referral Priority:   Routine     Referral Type:   Surgical     Referral Reason:   Evaluation and Treatment     Requested Specialty:   General Surgery     Number of Visits Requested:   1     There are no discontinued medications.    No follow-ups on file.    CHIEF COMPLAINT:  Chief Complaint   Patient presents with     Breast Mass     2 weeks, can be painful, no.       SUBJECTIVE:  Joanne is a 36 y.o. female the patient is a native Thai speaker from [place] she is interviewed via an .  She noticed in mid March or so a right breast mass she was seen in the emergency room on April 1, she had an ultrasound which is suspicious for malignancy however she has further imaging and biopsy results scheduled on April 13.    The patient is concerned about follow-up and so forth I told her typically given her findings thus far and potential findings I think she should be seen a breast surgeon I will place that referral now.    Of note, the patient thinks is probably been 10 years or more since she has had a tetanus booster on the other hand we do have the availability of starting the Covid vaccine series today, I told her I think it is very important given her history that she be vaccinated and we can defer on the tetanus at this time.    He is otherwise healthy she has very good health habits she is not on any regular medications, she is not allergic to any medications, only prior surgery is 2 C-sections.    There is no family history of breast cancer, of note she is in  the [place] for at least several months, and I told her that if in fact she is diagnosed with breast cancer then I assume that treatment would begin immediately before she goes back.        REVIEW OF SYSTEMS:      All other systems are negative.    PFSH:    There is no immunization history on file for this patient.  Social History     Socioeconomic History     Marital status:      Spouse name: Not on file     Number of children: 2     Years of education: Not on file     Highest education level: Not on file   Occupational History     Occupation: Not working   Social Needs     Financial resource strain: Not on file     Food insecurity     Worry: Not on file     Inability: Not on file     Transportation needs     Medical: Not on file     Non-medical: Not on file   Tobacco Use     Smoking status: Never Smoker     Smokeless tobacco: Never Used   Substance and Sexual Activity     Alcohol use: Never     Frequency: Never     Binge frequency: Never     Drug use: Not on file     Sexual activity: Not on file   Lifestyle     Physical activity     Days per week: Not on file     Minutes per session: Not on file     Stress: Not on file   Relationships     Social connections     Talks on phone: Not on file     Gets together: Not on file     Attends [employer] service: Not on file     Active member of club or organization: Not on file     Attends meetings of clubs or organizations: Not on file     Relationship status: Not on file     Intimate partner violence     Fear of current or ex partner: Not on file     Emotionally abused: Not on file     Physically abused: Not on file     Forced sexual activity: Not on file   Other Topics Concern     Not on file   Social History Narrative    Diet-        Exercise-        [place]     History reviewed. No pertinent past medical history.  Family History   Problem Relation Age of Onset     Diabetes type II Mother      Diabetes type II Father        MEDICATIONS:  No current  outpatient medications on file.     No current facility-administered medications for this visit.        TOBACCO USE:  Social History     Tobacco Use   Smoking Status Never Smoker   Smokeless Tobacco Never Used       VITALS:  Vitals:    04/09/21 1503   BP: 122/76   Weight: 133 lb 3.2 oz (60.4 kg)     Wt Readings from Last 3 Encounters:   04/09/21 133 lb 3.2 oz (60.4 kg)   04/01/21 133 lb (60.3 kg)       PHYSICAL EXAM:  Constitutional:   Reveals a young female who overall looks healthy.  Vitals: per nursing notes.  Eyes:  EOMs full, PERRL.  Musculoskeletal: No peripheral swelling.  Neuro:  Alert and oriented. Cranial nerves, motor, sensory exams are intact.  No gross focal deficits.  Psychiatric:  Memory intact, mood appropriate.    QUALITY MEASURES:      DATA REVIEWED:  I reviewed the emergency room note from April 1 as well as the breast ultrasound which shows a suspicious mass in the right breast area.

## 2021-06-17 NOTE — PROGRESS NOTES
I called patient with results. She was admitted to the hospital when her symptoms worsened after being seen in [facility].    Linsey Rodrigez MD

## 2021-06-17 NOTE — TELEPHONE ENCOUNTER
The patient reported that her breast mass is getting larger.  At this point, I determined that this is medical urgency to initiate neoadjuvant chemotherapy and should not delay any further for cancer treatment.    We will schedule to initiate neoadjuvant chemotherapy next week.    Chelly Sanchez MD

## 2021-06-17 NOTE — TELEPHONE ENCOUNTER
Carey, friend of Joanne called writer with concern of Joanne's scheduled chemo needing to be delayed due to insurance not being in place yet at this time.Consent to communicate is on file. Carey did not find out about this last evening as the call from [facility], infusion , was made to Joanne and her sister yesterday afternoon. Referenced note in chart written today for update. Carey wanted some reassurance that the insurance would be approved. Writer is aware that EMA was promptly initiated by [facility] when Joanne was scheduled for an apt with our clinic and shared encouragement  that the [facility] team is working on getting her care plan approved. Carey has also left a message for Rupesh Montero, [facility], and is waiting a call back with an update from him with additional information. She would like any update about the insurance as soon as it becomes available. Carey shared that Joanne plans to seek asylum in the [place] and they are trying to have her two daughters join her in the [place] as soon as possible. Her sister is helping her with this and they are working with an . Carey tries to help with the medical issues.  Joanne is very anxious about starting treatment as soon as possible.Listened to all of her concerns and offered support and encouragement. Will discuss above situation with CC MADDIE as well

## 2021-06-17 NOTE — TELEPHONE ENCOUNTER
Pt's friend, Carey called [facility] today on behalf of the pt. We do have consent to communicate. Carey states pt and sister had a conversation with the [facility]  yesterday. There are still some questions they have in regard to that. I spoke with [facility] and relayed to Carey that we are waiting for insurance approval. Carey says pt was considering paying out of pocket for her 1st treatment but cannot pay $70,000. I did tell Carey that Dr. Sanchez feels waiting 1-2 weeks for insurance approval will not be harmful to pt.   Per Joanne Patino's question to Dr. Sanchez is: what will we do if she does not get approval? Dr. Sanchez says she is not sure at this point. We will have to consider going ahead with surgery vs trying to modify treatment plan to try to bring down cost. We will continue to be in contact with pt about the plan going forward.   Carey will relay this information to the pt.

## 2021-06-17 NOTE — PRE-PROCEDURE
Procedure Name: port placement  Date/Time: 4/28/2021 2:31 PM  Written consent obtained?: Yes  Risks and benefits: Risks, benefits and alternatives were discussed  Consent given by: patient  Expected level of sedation: moderate  ASA Class: Class 3- Severe systemic disease, definite functional limitations  Mallampati: Grade 2- soft palate, base of uvula, tonsillar pillars, and portion of posterior pharyngeal wall visible  Patient states understanding of procedure being performed: Yes  Patient's understanding of procedure matches consent: Yes  Procedure consent matches procedure scheduled: Yes  Appropriately NPO: yes  Lungs: lungs clear with good breath sounds bilaterally  Heart: normal heart sounds and rate  History & Physical reviewed: History and physical reviewed and no updates needed  Statement of review: I have reviewed the lab findings, diagnostic data, medications, and the plan for sedation

## 2021-06-17 NOTE — TELEPHONE ENCOUNTER
Patient's friend Carey called (consent to communicate on file), wanting some update on status of approval for treatment for breast cancer.They are aware EMA has been approved, but don't understand why chemo cannot start. Explained that the specific plan still has to be authorized in addition.   She is asking for some type of status update.   RN reaching out to KELLY Cota.  Abby Soriano RN

## 2021-06-17 NOTE — PROGRESS NOTES
[facility] Hematology and Oncology Progress Note    Patient: Joanne Colon  MRN: [id number]  Date of Service: 05/13/2021        Reason for Visit    Chief Complaint   Patient presents with     HE Cancer     Breast Cancer       Assessment and Plan  Cancer Staging  Malignant neoplasm of upper-inner quadrant of right breast in female, estrogen receptor positive (H)  Staging form: Breast, AJCC 8th Edition  - Clinical stage from 4/21/2021: Stage IB (cT2, cN1(f), cM0, G3, ER+, RI+, HER2+) - Signed by Chelly Sanchez MD on 4/24/2021      ECOG Performance   ECOG Performance Status: 0    Distress Assessment  Distress Assessment Score: Unable to rate(high today due to starting chemo today)    Pain  Currently in Pain: No/denies      #. cT2 N1 M0 invasive ductal carcinoma, grade 3, ER positive, RI positive, HER-2 positive.   Clinically well.  I reviewed her echocardiogram and she has normal cardiac function.  She is feeling adequate to initiate neoadjuvant chemotherapy.  Labs were reviewed and they are normal including hemogram, complete metabolic profile.  The are concerned about cost of the medication.  I reviewed the premedication and chemotherapy regimen and all are clearly indicated.  Because of her age, I will proceed without G-CSF.  However, we might need to add G-CSF depending on how she responded to for cycle of chemotherapy, mainly severe neutropenia.  They agree to proceed.  She signed consent today.   Return to clinic in 10 days for midcycle check.   She is advised to call us sooner with any concerns.     #. Social Determinants of Health/Other Risk Factors:  comorbidities affecting current problem were discussed specifically lack of health insurance and obtaining healthcare coverage, language barrier and early relapse.      Problem List    1. Malignant neoplasm of upper-inner quadrant of right breast in female, estrogen receptor positive (H)  Infusion Appointment    CC OFFICE VISIT LONG    Infusion Appointment     CC OFFICE VISIT LONG    1(CBC and Differential)    Comprehensive Metabolic Panel    [facility] RED    CC OFFICE VISIT LONG    CC OFFICE VISIT LONG    DISCONTINUED: sodium chloride 0.9% 250 mL infusion    DISCONTINUED: palonosetron injection 0.25 mg (ALOXI)    DISCONTINUED: fosaprepitant 150 mg, dexAMETHasone 12 mg in sodium chloride 0.9% 150 mL    DISCONTINUED: trastuzumab-anns 460 mg in sodium chloride 0.9% 250 mL (KANJINTI)    DISCONTINUED: DOCEtaxeL 120 mg in NaCl 0.9% (non-PVC) 250 mL (TAXOTERE)    DISCONTINUED: CARBOplatin 600 mg in dextrose 5% 250 mL (PARAPLATIN)    DISCONTINUED: sodium chloride flush 20 mL (NS)    DISCONTINUED: heparin lockflush (PF) porcine 300-600 Units    DISCONTINUED: diphenhydrAMINE injection 50 mg (BENADRYL)    DISCONTINUED: famotidine 20 mg/2 mL injection 20 mg (PEPCID)    DISCONTINUED: hydrocortisone sod succ (PF) injection 100 mg    DISCONTINUED: acetaminophen tablet 1,000 mg (TYLENOL)    DISCONTINUED: sodium chloride 0.9% 500 mL    DISCONTINUED: pertuzumab 420 mg in sodium chloride 0.9% 250 mL (PERJETA)   2. Encounter for antineoplastic chemotherapy  Infusion Appointment    CC OFFICE VISIT LONG    Infusion Appointment    CC OFFICE VISIT [facility](CBC and Differential)    Comprehensive Metabolic Panel    [facility] RED    CC OFFICE VISIT LONG    CC OFFICE VISIT LONG    DISCONTINUED: sodium chloride 0.9% 250 mL infusion    DISCONTINUED: palonosetron injection 0.25 mg (ALOXI)    DISCONTINUED: fosaprepitant 150 mg, dexAMETHasone 12 mg in sodium chloride 0.9% 150 mL    DISCONTINUED: trastuzumab-anns 460 mg in sodium chloride 0.9% 250 mL (KANJINTI)    DISCONTINUED: DOCEtaxeL 120 mg in NaCl 0.9% (non-PVC) 250 mL (TAXOTERE)    DISCONTINUED: CARBOplatin 600 mg in dextrose 5% 250 mL (PARAPLATIN)    DISCONTINUED: sodium chloride flush 20 mL (NS)    DISCONTINUED: heparin lockflush (PF) porcine 300-600 Units    DISCONTINUED: diphenhydrAMINE injection 50 mg (BENADRYL)    DISCONTINUED: famotidine 20 mg/2  mL injection 20 mg (PEPCID)    DISCONTINUED: hydrocortisone sod succ (PF) injection 100 mg    DISCONTINUED: acetaminophen tablet 1,000 mg (TYLENOL)    DISCONTINUED: sodium chloride 0.9% 500 mL    DISCONTINUED: pertuzumab 420 mg in sodium chloride 0.9% 250 mL (PERJETA)        CC: Ye Lira MD    ______________________________________________________________________________  Diagnosis  4/2021-presented to the emergency room with a mass in her right breast along with pain for about 3 months or so.  Denies nipple changes or discharge.  Underwent diagnostic mammogram and ultrasound on 4/13/2021 and it confirmed right breast mass in the 8 o'clock position, 5 cm from the nipple of 2.2 x 2.8 x 1.4 cm with abnormal appearing right axillary lymph node of 1.7 cm with cortical thickness.  She underwent ultrasound-guided core needle biopsy on the same day and it showed invasive ductal carcinoma, grade 3, ER moderate to strong positive, AR strongly positive, HER-2 positive by IHC.   - She met with Dr. Perdomo from surgery and recommended to consider neoadjuvant chemotherapy to HER-2 positive lymph node positive disease.      Treatment to date  5/13/2021-initiated neoadjuvant TCHP    History of Present Illness    Ms. Joanne Colon presented today accompanied by her friend, Carey. She speaks Italian. She declined  and she requests her friend to interpret for her.     She reported some hot flashes and dry throat.  She has loose stool this morning felt to be from nervousness.  She is feeling scared starting chemotherapy today.  No fever.  No infection symptoms.    Pain Status  Currently in Pain: No/denies    Review of Systems    Constitutional  Constitutional (WDL): Exceptions to WDL  Neurosensory  Neurosensory (WDL): Exceptions to WDL  Cardiovascular  Cardiovascular (WDL): All cardiovascular elements are within defined limits  Pulmonary  Respiratory (WDL): Within Defined Limits  Gastrointestinal  Gastrointestinal  (WDL): Exceptions to WDL  Diarrhea: Increase of <4 stools per day over baseline, mild increase in ostomy output compared to baseline(small loose stool this morning)  Dry Mouth: Symptomatic (e.g., dry or thick saliva) without significant dietary alteration, unstimulated saliva flow >0.2 ml/min(dry throat)  Genitourinary  Genitourinary (WDL): All genitourinary elements are within defined limits  Integumentary  Integumentary (WDL): All integumentary elements are within defined limits  Patient Coping  Patient Coping: Accepting;Open/discussion  Distress Assessment  Distress Assessment Score: Unable to rate(high today due to starting chemo today)  Accompanied by  Accompanied by: Friend(Yumiko)    Past History  Past Medical History:   Diagnosis Date     Breast cancer (H)          Past Surgical History:   Procedure Laterality Date      SECTION      x two     IR PORT PLACEMENT >5 YEARS  2021     US BIOPSY FINE NEEDLE ASPIRATION LYMPH NODE (BREAST) RIGHT Right 2021     US BREAST CORE BIOPSY RIGHT Right 2021       Physical Exam    Recent Vitals 2021   Height -   Weight -   BSA (m2) -   BP 94/51   Pulse 131   Temp 101.4   Temp src 1   SpO2 97   Some recent data might be hidden       General: alert, awake, not in acute distress  HEENT: Head: Normal, normocephalic, atraumatic.  Eye: Normal external eye, conjunctiva, lids cornea, LISSETH.  Nose: Normal external nose, mucus membranes and septum.  Pharynx: Normal buccal mucosa. Normal pharynx.  Neck / Thyroid: Supple, no masses, nodes, nodules or enlargement.  Lymphatics: No abnormally enlarged lymph nodes.  Chest: Normal chest wall and respirations. Clear to auscultation.  Breasts: About 4.5 cm from fixed mass in the upper outer quadrant of the right breast.  Heart: S1 S2 RRR, no murmur.   Abdomen: abdomen is soft without significant tenderness, masses, organomegaly or guarding  Extremities: normal strength, tone, and muscle mass  Skin: normal. no rash or  abnormalities  CNS: non focal.      Lab Results    Recent Results (from the past 168 hour(s))   Rapid Strep A Screen-Throat swab    Specimen: Throat   Result Value Ref Range    Rapid Strep A Antigen No Group A Strep detected, presumptive negative No Group A Strep detected, presumptive negative   Group A Strep PCR Throat Swab    Specimen: Throat   Result Value Ref Range    Group Strep A by PCR No Group A Strep detected No Group A Strep detected, Invalid, ERROR   COVID-19 Virus PCR MRF    Specimen: Respiratory   Result Value Ref Range    COVID-19 VIRUS SPECIMEN SOURCE Nasopharyngeal     2019-nCOV       Test received-See reflex to IDDL test SARS CoV2 (COVID-19) Virus RT-PCR   SARS-CoV-2 (COVID-19)-PCR    Specimen: Respiratory   Result Value Ref Range    SARS-CoV-2 Virus Specimen Source Nasopharyngeal     SARS-CoV-2 PCR Result NEGATIVE     SARS-COV-2 PCR COMMENT       Testing was performed using the Xpert Xpress SARS-CoV-2 Assay on the [employer]   COVID-19 VIRUS (CORONAVIRUS) BY PCR - EXTERNAL RESULT    Specimen: Swab    Specimen type and source: Swab, Specimen from nasal fossae (specimen)   Result Value Ref Range    COVID-19 Virus by PCR (External Result) Negative Negative   Lactic Acid   Result Value Ref Range    Lactic Acid 0.7 0.7 - 2.0 mmol/L   Procalcitonin   Result Value Ref Range    Procalcitonin 0.13 0.00 - 0.49 ng/mL   Comprehensive Metabolic Panel   Result Value Ref Range    Sodium 135 (L) 136 - 145 mmol/L    Potassium 3.1 (L) 3.5 - 5.0 mmol/L    Chloride 105 98 - 107 mmol/L    CO2 19 (L) 22 - 31 mmol/L    Anion Gap, Calculation 11 5 - 18 mmol/L    Glucose 141 (H) 70 - 125 mg/dL    BUN 4 (L) 8 - 22 mg/dL    Creatinine 0.64 0.60 - 1.10 mg/dL    GFR MDRD Af Amer >60 >60 mL/min/1.73m2    GFR MDRD Non Af Amer >60 >60 mL/min/1.73m2    Bilirubin, Total 0.7 0.0 - 1.0 mg/dL    Calcium 8.1 (L) 8.5 - 10.5 mg/dL    Protein, Total 6.2 6.0 - 8.0 g/dL    Albumin 3.1 (L) 3.5 - 5.0 g/dL    Alkaline Phosphatase 85 45 - 120 U/L     AST 89 (H) 0 - 40 U/L     (H) 0 - 45 U/L   Magnesium   Result Value Ref Range    Magnesium 1.5 (L) 1.8 - 2.6 mg/dL   HCG, Serum, Qualitative   Result Value Ref Range    Beta hCG Qualitative Negative Negative   HM1 (CBC with Diff)   Result Value Ref Range    WBC 1.1 (LL) 4.0 - 11.0 thou/uL    RBC 3.82 3.80 - 5.40 mill/uL    Hemoglobin 11.3 (L) 12.0 - 16.0 g/dL    Hematocrit 32.3 (L) 35.0 - 47.0 %    MCV 85 80 - 100 fL    MCH 29.6 27.0 - 34.0 pg    MCHC 35.0 32.0 - 36.0 g/dL    RDW 11.7 11.0 - 14.5 %    Platelets 187 140 - 440 thou/uL    MPV 10.6 8.5 - 12.5 fL   APTT(PTT)   Result Value Ref Range    PTT 35 24 - 37 seconds   Protime-INR   Result Value Ref Range    INR 1.21 (H) 0.90 - 1.10   Manual Differential   Result Value Ref Range    Total Neutrophils % 0 (L) 50 - 70 %    Lymphocytes % 68 (H) 20 - 40 %    Monocytes % 32 (H) 2 - 10 %    Eosinophils %  0 0 - 6 %    Basophils % 0 0 - 2 %    Immature Granulocyte % - Manual 0 <=0 %    Total Neutrophils Absolute 0.0 (L) 2.0 - 7.7 thou/ul    Lymphocytes Absolute 0.7 (L) 0.8 - 4.4 thou/uL    Monocytes Absolute 0.4 0.0 - 0.9 thou/uL    Eosinophils Absolute 0.0 0.0 - 0.4 thou/uL    Basophils Absolute 0.0 0.0 - 0.2 thou/uL    Immature Granulocyte Absolute - Manual 0.0 <=0.0 thou/uL    Platelet Estimate Normal Normal   Urinalysis-UC if Indicated   Result Value Ref Range    Color, UA Colorless Light Yellow, Yellow    Clarity, UA Clear Clear    Glucose, UA Negative Negative    Protein, UA Negative Negative    Bilirubin, UA Negative Negative    Urobilinogen, UA <2.0 mg/dL <2.0 mg/dL    pH, UA 5.5 5.0 - 8.0    Blood, UA 0.06 mg/dL (!) Negative    Ketones, UA Trace Negative    Nitrite, UA Negative Negative    Leukocytes, UA Negative Negative    Specific Gravity, UA 1.019 1.001 - 1.030    RBC, UA 5 (H) <=2 hpf    WBC UA 2 <=5 hpf    Bacteria, UA None Seen None Seen    Squamous Epithel, UA <1 <=5 /HPF   Comprehensive metabolic panel   Result Value Ref Range    Sodium 137  136 - 145 mmol/L    Potassium 3.5 3.5 - 5.0 mmol/L    Chloride 107 98 - 107 mmol/L    CO2 21 (L) 22 - 31 mmol/L    Anion Gap, Calculation 9 5 - 18 mmol/L    Glucose 126 (H) 70 - 125 mg/dL    BUN 4 (L) 8 - 22 mg/dL    Creatinine 0.63 0.60 - 1.10 mg/dL    GFR MDRD Af Amer >60 >60 mL/min/1.73m2    GFR MDRD Non Af Amer >60 >60 mL/min/1.73m2    Bilirubin, Total 0.6 0.0 - 1.0 mg/dL    Calcium 8.1 (L) 8.5 - 10.5 mg/dL    Protein, Total 6.2 6.0 - 8.0 g/dL    Albumin 3.1 (L) 3.5 - 5.0 g/dL    Alkaline Phosphatase 95 45 - 120 U/L    AST 77 (H) 0 - 40 U/L     (H) 0 - 45 U/L   Magnesium   Result Value Ref Range    Magnesium 1.6 (L) 1.8 - 2.6 mg/dL       Imaging    Xr Chest 1 View    Result Date: 5/23/2021  EXAM: CHEST SINGLE VIEW PORTABLE LOCATION: [facility] DATE/TIME: 5/23/2021 2:54 AM INDICATION: Fever. Neutropenia. Sore throat. COMPARISON: None. FINDINGS: The lungs are clear. Normal size cardiac silhouette. Left anterior chest wall port with catheter entering the left internal jugular vein and distal tip in the superior vena cava.     No evidence of active cardiopulmonary disease.     Ir Port Placement 5+ Years    Result Date: 4/28/2021  [place] RADIOLOGY LOCATION: [facility] DATE: 4/28/2021 PROCEDURE: IMPLANTABLE VENOUS CHEST PORT PLACEMENT (POWER INJECTABLE) INTERVENTIONAL RADIOLOGIST: Emilio Taveras MD. INDICATION: 36-year-old female with right breast carcinoma and need for chemotherapy access. CONSENT: The risks, benefits and alternatives of implantable venous chest port placement were discussed with the patient  in detail. All questions were answered. Informed consent was given to proceed with the procedure. MODERATE SEDATION: Versed 1 mg IV; Fentanyl 50 mcg IV.  Under physician supervision, Versed and fentanyl were administered for moderate sedation. Pulse oximetry, heart rate and blood pressure were continuously monitored by an independent trained observer. The  physician spent 15 minutes of face-to-face sedation time with the patient. CONTRAST: None. ANTIBIOTICS: Ancef 2 g IV. ADDITIONAL MEDICATIONS: None. FLUOROSCOPIC TIME: 0.3 minutes. RADIATION DOSE: Air Kerma: 8 mGy. COMPLICATIONS: No immediate complications. STERILE BARRIER TECHNIQUE: Maximum sterile barrier technique was used. Cutaneous antisepsis was performed at the operative site with application of 2% chlorhexidine and large sterile drape. Prior to the procedure, the  and assistant performed hand hygiene and wore hat, mask, sterile gown, and sterile gloves during the entire procedure. PROCEDURE:  Using real-time ultrasound guidance the left internal jugular vein was accessed. A subcutaneous pocket was created and irrigated with sterile normal saline. The catheter tubing was tunneled in an antegrade fashion from the port pocket to the dermatotomy site. Over a guidewire, and under direct fluoroscopic visualization a peel-away sheath was advanced over the wire. Through the peel-away sheath, the catheter tubing was advanced until the tip was at the cavoatrial junction. The catheter tubing was cut to length and attached firmly to the port. The port was placed within the subcutaneous pocket and tested. The port pocket incision was closed with layered absorbable suture and surgical glue. The dermatotomy site was closed with surgical glue. FINDINGS: Ultrasound demonstrates an anechoic and compressible jugular vein. A permanent image was stored. At the completion of the study the port tip lies near the cavoatrial junction.     1.  Successful implantable venous chest port placement as detailed above. 2.  The implantable venous chest port was placed under fluoroscopic guidance and is ready for use immediately. ____________________________________________________________________ CPT codes for physician reference only: 10533 [id number]     Ct Abdomen Pelvis Without Oral With Iv Contrast    Result Date:  5/23/2021  EXAM: CT ABDOMEN PELVIS WO ORAL W IV CONTRAST LOCATION: [facility] DATE/TIME: 5/23/2021 2:56 AM INDICATION: Abdominal infection suspected right sided abdominal pain, febrile neutropenia, diarrhea, breast cancer COMPARISON: None. TECHNIQUE: CT scan of the abdomen and pelvis was performed following injection of IV contrast. Multiplanar reformats were obtained. Dose reduction techniques were used. CONTRAST: Iopamidol ([id number]-370) 100mL FINDINGS: LOWER CHEST: Lung bases clear. HEPATOBILIARY: Several indeterminate hypodense right hepatic lesions. 10 mm lesion series 3 image 35 and 7 mm lesion image 31. PANCREAS: Normal. SPLEEN: Normal. ADRENAL GLANDS: Normal. KIDNEYS/BLADDER: Normal. BOWEL: Wall thickening of the cecum and right colon. Normal caliber bowel. Appendix not visualized. No inflammation expected region of the appendix. Underdistention versus wall thickening rectosigmoid colon. LYMPH NODES: Small right lower quadrant lymph nodes. VASCULATURE: Unremarkable. PELVIC ORGANS: Normal. MUSCULOSKELETAL: Normal.     1.  Wall thickening of the cecum and right colon consistent with colitis. Underdistention versus wall thickening of the rectosigmoid colon. 2.  2 small indeterminant hypodense right hepatic lesions. Comparison to any previous imaging recommended. If none available consider nonurgent MR follow-up for characterization.        Signed by: Chelly Sanchez MD

## 2021-06-17 NOTE — PROGRESS NOTES
Results discussed directly with patient while patient was present. Any further details documented in the note.   Linsey Rodrigez MD

## 2021-06-17 NOTE — TELEPHONE ENCOUNTER
Carey called to relay that she spoke with clinic nurse Abby this morning and shared that Joanne feels that her breast mass is increasing in size She would like to move forward with chemo as soon as possible and they feel that they may be able to get 10,000 together for a down payment for treatment but not 70,000 that is required to move forward.. She shared that is chemo is not an option right now, wondering about surgery as an alternative first treatment. Shared that Elodia, infusion specialist, had planned to talk with Dr. Sanchez about case this week but writer has not heard outcome of the conversation. Carey requests a call back before the weekend with status update. Writer will attempt to get update on plan and call Carey back today.

## 2021-06-17 NOTE — TELEPHONE ENCOUNTER
Followed up with Carey, friend and  for Joanne, who is helping her with her medical care. Relayed update that per Ester Hernandes, Patient Financial counselor for [employer],  [employer] is now approved and we can move ahead with creating a care plan. Reiterated that per Elodia, Infusion  , the infusion department has to submit the care plan to Jacquelin GARCIA to receive an authorization for the drugs on her chemo plan. Due to this not being completed, we are not able to obtain an authorization at this time and cannot guarantee that the plan will be approved and paid by insurance. Per Joanne's provider, the case is not medically urgent, so in this case Donnellia would need to sign a waiver and pay upfront for her infusion visit the day prior ($70,000). Per Carey, Joanne is anxious to move forward but has elected to postpone treatment until the care plan is in place. Carey understands that Elodia will follow up with Joanne and her sister next week, as previously  planned, to relay an update on the care plan status. Writer also tried calling Joanne this morning with assistance of  [phone] but NA. Carey plans to see Joanne this morning and will relay/reiterate the above information to her. Support and encouragement provided.

## 2021-06-17 NOTE — TELEPHONE ENCOUNTER
Called Joanne to relay that the [facility] was approved. Grocery (500) and gas (350) cards will be mailed to her, per her request of how she would like to use the stipend. She also shared that her chemo was postponed this past week and expressed understanding that that the care plan is being submitted for approval. She shared her concern about the postponing her treatment with her cancer. Listened to her concerns and shared that Dr. Sanchez is aware  wait 1-2 weeks for insurance approval will not be harmful. Assured her that Elodia, Infusion Specialist, will call her next week with an update on the care plan. She expressed appreciation for the call.

## 2021-06-17 NOTE — TELEPHONE ENCOUNTER
Called and spoke with Joanne and her friend, Carey, who assisted with translation. Joanne was able to tell me in English that she wanted Carey to interpret for her and she declined a [place] .  Relayed that due to her enlarging breast mass,  Dr. Sanchez has determined  that at this point it is medically urgent  to initiate neoadjuvant chemotherapy and should not delay any further for cancer treatment. Dr. Sanchez  would like her to start next week.. Joanne needs to sign a waiver and if SUSAN does not approve, Joanne will be responsible for the bill. She will need to sign the prepared waiver prior to her treatment. Of note, Elodia ESPINO,  Infusion specialist, will prepare the waiver for her to sign. Joanne verbalized understanding and expressed that she wants to proceed. She shared that she has breast discomfort and she feels her breast mass growing.  Explained that the schedulers will reach out to arrange her follow up/ chemo infusion. Joanne would like the schedulers to call Carey who will conference her into the call. Carey will provide transportation to clinic for Joanne for her infusion and would like the first available appointment time. They expressed appreciation. Will follow up in the future.  -5/10 Joanne has been scheduled for first cycle of chemo on 5/13. Elodia, Infusion , was notified of this date via IB,  so that she can prepare the waiver for Joanne to sign prior to her treatment.

## 2021-06-17 NOTE — TELEPHONE ENCOUNTER
" on the line.  Pt is having fever 100.4 since yesterday.  This morning at 7:00 am pt took two Ibuprofen and decreased to 99.9.  Now fever 100.4. Pt states sore throat since yesterday.  Currently white spots on the back of her throat.  Pt intends to go to [facility] today.  Rosita Webster RN, MA  [facility]    Triage Nurse Advisor    Reason for Disposition    [1] Pus on tonsils (back of throat) AND [2]  fever AND [3] swollen neck lymph nodes (\"glands\")    Additional Information    Negative: Shock suspected (e.g., cold/pale/clammy skin, too weak to stand, low BP, rapid pulse)    Negative: Difficult to awaken or acting confused (e.g., disoriented, slurred speech)    Negative: [1] Difficulty breathing AND [2] bluish lips, tongue or face    Negative: New onset rash with multiple purple (or blood-colored) spots or dots    Negative: Sounds like a life-threatening emergency to the triager    Negative: Fever in a cancer patient who is currently (or recently) receiving chemotherapy or radiation therapy, or cancer patient who has metastatic or end-stage cancer and is receiving palliative care    Negative: Pregnant    Negative: Postpartum (from 0 to 6 weeks after delivery)    Negative: Fever onset within 24 hours of receiving vaccine    Negative: [1] Fever AND [2] within 14 days of COVID-19 Exposure    Negative: Other symptom is present, see that guideline  (e.g., symptoms of cough, runny nose, sore throat, earache, abdominal pain, diarrhea, vomiting)    Negative: [1] Headache AND [2] stiff neck (can't touch chin to chest)    Negative: Difficulty breathing    Negative: IV drug abuse    Protocols used: SORE THROAT-A-AH, FEVER-A-AH      "

## 2021-06-17 NOTE — PROGRESS NOTES
Assessment & Plan     Sore throat  Strep test was negative and low suspicion for strep but will await PCR as well. Discussed that despite being vaccinated for COVID19 that she could also still catch COVID19. Discussed that it's possible this is a side effect of chemotherapy but this is a question best answered by her oncologist. Reviewed symptom treatment for her sore throat and discussed taking 600mg of ibuprofen every 6 hours for her pain as she was unsure how many pills to take and to try and take them with food. She has an appointment on Monday with the cancer center as well and will ask questions at that visit. Overall her ears, nose, and throat were normal appearing without sores or lesions.   - Rapid Strep A Screen-Throat swab  - Group A Strep PCR Throat Swab  - Symptomatic COVID-19 Virus (CORONAVIRUS) PCR      Diagnosis or treatment significantly limited by social determinants of health - language barrier  Ordering of each unique test       Return in about 2 days (around 5/24/2021) for Follow up with oncologist to see if sore throat is related to chemotherapy.    Linsey Rodrigez MD  [facility]   Joanne Colon is 36 y.o. and presents today for the following health issues   Chief Complaint   Patient presents with     Sore Throat     xLAST NIGHT SX: CHILLS, FEVER        HPI     She's had fever and chills last night and is concerned about strep. She feels that her throat is swollen and she is on chemotherapy and is worried about a possible side effect from that. She was vaccinated for COVID19. They waited two weeks after shots before starting chemotherapy. She took ibuprofen around 2 this morning. She is very nauseous and has medications for this at home. She was wondering how many of her ibuprofen pills she could take at one time.     She declined  and wanted her friend to  for her today.      Review of Systems  See HPI      Objective     /73 (Patient Site: Right Arm, Patient Position: Sitting, Cuff Size: Adult Regular)   Pulse (!) 111   Temp 100.3  F (37.9  C) (Oral)   Resp 16   Wt 124 lb 8 oz (56.5 kg)   SpO2 98%   BMI 24.31 kg/m    Body mass index is 24.31 kg/m .  Physical Exam   Constitutional: She appears well-developed.   HENT:   Head: Normocephalic.   Right Ear: Tympanic membrane, external ear and ear canal normal. No drainage or tenderness. No middle ear effusion.   Left Ear: Tympanic membrane, external ear and ear canal normal. No drainage or tenderness.  No middle ear effusion.   Nose: Nose normal.   Mouth/Throat: Oropharynx is clear and moist and mucous membranes are normal. No oral lesions. Normal dentition. No uvula swelling or lacerations. No oropharyngeal exudate, posterior oropharyngeal edema, posterior oropharyngeal erythema or tonsillar abscesses.   Eyes: Conjunctivae and EOM are normal.   Cardiovascular: Normal rate and regular rhythm.   Pulmonary/Chest: Effort normal and breath sounds normal. No respiratory distress. She has no wheezes.

## 2021-06-17 NOTE — TELEPHONE ENCOUNTER
Called Joanne with the assistance of , Fatoumata Demian. Of note, he speaks the same Hebrew dialect of Joanne and she has requested to have him interpret anytime that he is available. She also has his  ID number for her reference, as well. Spoke with Joanne about upcoming chemo infusion. She understands that hair loss is an anticipated side effect of chemo. She already wears a head covering and will continue to use a scarf. She declines need for wig or hats. We talked about things to bring to her chemo infusion and she will plan to pack a light lunch, snacks. reading materials, headphones, phone.. She will plan to face time in a family member so that they can listen into her chemo education. Clinic triage number provided for future symptom management. Also gave her the family  line so that she can easily call , if needed. ([phone]).  She has a 9 and 12 year old daughter that are still in [place]. She hopes to bring them to the [place] so they will be closer to her. Unsure of future plans to going back to [place]. She is living with her sister and her family (spouse and 4 children). The cell phone is her personal phone number. We spoke about financial grants and will apply for [employer] on her behalf. She would like gas a grocery cards to help take the burden off her sister. They are supporting her at this time.  She has writer's contact information, invited calls with questions or support needs.  Will follow up in the future.

## 2021-06-17 NOTE — PROGRESS NOTES
Application to [employer], General Patrick, was initiated on patient's behalf via the portal. Gas and [employer] cards requested if the patrick is approved. Will contact patient to relay update when/if  patrick is approved.

## 2021-06-17 NOTE — PROGRESS NOTES
Via , Carey, patient educated on:  -Chemotherapy: what it is and how it works  -Described a typical day at the treatment center and what the patient can expect  -Chemotherapy side effects and appropriate management of these side effects. SE discussed included and not limited to: Fatigue, nausea and vomiting, constipation, diarrhea, mucositis, alopecia, peripheral neuropathy, pain, anemia, thrombocytopenia, and neutropenia.    -Reasons to call the physician, including, fever>100.5, shaking chills, unusual bleeding or bruising, shortness of breath, vomiting>12hours, nausea >24 hours, unable to eat/drink >24 hours, painful urination, blood in urine or stool, soreness at the IV site, and painful mouth sores.  The  triage phone number was given to the patient and the patient was encouraged to call with any questions.  The patient was given a tour of the infusion center.  All questions were addressed to the best of my abilities and the patient was encouraged to call with any questions.

## 2021-06-17 NOTE — TELEPHONE ENCOUNTER
Boyd, patient's friend and  called on behalf of Joanne.  Asked if it would be possible for Opal to have her surgery before her chemo as her insurance is still not approved for the chemo part of her treatment.  Told her I did reach out to both Dr. Perdomo and Dr. Sanchez.  Both agreed that it was not optimal, but could be done to help not further delay Opal's care.  Told boyd that Dr. Perdomo felt that a lumpectomy would be a difficult surgery without the chemo to shrink the cancer down first.  Opal has an appointment with Dr. Sanchez tomorrow.  They will further discuss this and call if it is decided surgery will be done first.  Support provided, invited calls.

## 2021-06-19 ENCOUNTER — HOSPITAL ENCOUNTER (OUTPATIENT)
Dept: MRI IMAGING | Facility: HOSPITAL | Age: 36
Setting detail: RADIATION/ONCOLOGY SERIES
Discharge: STILL ADMITTED OR ADMINISTRATIVE | End: 2021-06-19
Attending: INTERNAL MEDICINE
Payer: MEDICAID

## 2021-06-19 DIAGNOSIS — K76.9 LIVER LESION: ICD-10-CM

## 2021-06-21 ENCOUNTER — COMMUNICATION - HEALTHEAST (OUTPATIENT)
Dept: ONCOLOGY | Facility: CLINIC | Age: 36
End: 2021-06-21

## 2021-06-21 NOTE — LETTER
Letter by Kira Ham RN at      Author: Kira Ham RN Service: -- Author Type: --    Filed:  Encounter Date: 4/16/2021 Status: (Other)       Dear Joanne,    Thank you for choosing [facility] for your care.  We are committed to providing you with the highest quality and compassionate healthcare services.  The following information pertains to your first appointment with our clinic.    Date/Time of appointment:  Thursday, April 22,2021, Check in at 10:30 a.m.    Due to the pandemic, we currently have a visitor restriction policy in place for the safety of our patients and staff members. You may bring one supportive person to your consult with you. We also ask that you wear a face covering when you enter our facility. We appreciate your understanding.    Name of your Physician:  Chelly Sanchez MD    What to bring to your appointment:    Completed Patient History/Initial Nursing Assessment and Medication/Allergy List (these forms were sent to you).    Your current insurance card(s).    Parking:    Please refer to the map included below to direct you to [facility]    The Cancer Care parking lot is west of the main hospital entrance. This is free parking and is located right next to the Cancer Care entrance.    Come in the Cancer Care entrance and check in at the .      We hope these instructions are helpful to you.  If you have any questions or concerns, please call us at [phone].  It is our pleasure to assist you.    Warm Regards,  Kira Ham RN, OCN, CBCN  Nurse Navigator  [phone]

## 2021-06-24 ENCOUNTER — OFFICE VISIT - HEALTHEAST (OUTPATIENT)
Dept: ONCOLOGY | Facility: CLINIC | Age: 36
End: 2021-06-24

## 2021-06-24 ENCOUNTER — INFUSION - HEALTHEAST (OUTPATIENT)
Dept: INFUSION THERAPY | Facility: CLINIC | Age: 36
End: 2021-06-24

## 2021-06-24 DIAGNOSIS — R79.0 LOW MAGNESIUM LEVEL: ICD-10-CM

## 2021-06-24 DIAGNOSIS — Z17.0 MALIGNANT NEOPLASM OF NIPPLE OF RIGHT BREAST IN FEMALE, ESTROGEN RECEPTOR POSITIVE (H): ICD-10-CM

## 2021-06-24 DIAGNOSIS — R19.7 DIARRHEA, UNSPECIFIED TYPE: ICD-10-CM

## 2021-06-24 DIAGNOSIS — Z51.11 ENCOUNTER FOR ANTINEOPLASTIC CHEMOTHERAPY: ICD-10-CM

## 2021-06-24 DIAGNOSIS — C50.011 MALIGNANT NEOPLASM OF NIPPLE OF RIGHT BREAST IN FEMALE, ESTROGEN RECEPTOR POSITIVE (H): ICD-10-CM

## 2021-06-24 LAB — MAGNESIUM SERPL-MCNC: 1.8 MG/DL (ref 1.8–2.6)

## 2021-06-25 ENCOUNTER — COMMUNICATION - HEALTHEAST (OUTPATIENT)
Dept: ONCOLOGY | Facility: CLINIC | Age: 36
End: 2021-06-25

## 2021-06-25 ENCOUNTER — AMBULATORY - HEALTHEAST (OUTPATIENT)
Dept: ONCOLOGY | Facility: CLINIC | Age: 36
End: 2021-06-25

## 2021-06-25 NOTE — TELEPHONE ENCOUNTER
Patient's friend, Carey, called and left message for call back to [phone].    Called Carey back (consent to communicate received-Marilou is her legal name but prefers to be called Carey).  She is wondering if spots seen on patient's liver from 5/23/21 CT need MRI or is this something seen with chemo? I told Carey I would further discuss with Dr. Sanchez and call her back. She verbalized understanding.    Discussed with Dr. Sanchez. She will order MRI/DOMINICK Klein RN    Called Carey to let her know that MRI ordered and Schedulers will call to schedule. Carey asked to call her to schedule at number list above/DOMINICK Klein RN

## 2021-06-25 NOTE — PROGRESS NOTES
[facility] Hematology and Oncology Progress Note    Patient: Joanne Colon  MRN: [id number]  Date of Service: 06/03/2021        Reason for Visit    Chief Complaint   Patient presents with     HE Cancer     Breast Cancer       Assessment and Plan  Cancer Staging  Malignant neoplasm of nipple of right breast in female, estrogen receptor positive (H)  Staging form: Breast, AJCC 8th Edition  - Clinical stage from 4/21/2021: Stage IB (cT2, cN1(f), cM0, G3, ER+, RI+, HER2+) - Signed by Chelly Sanchez MD on 4/24/2021      ECOG Performance        Distress Assessment       Pain         #. cT2 N1 M0 invasive ductal carcinoma, grade 3, ER positive, RI positive, HER-2 positive.   She has good clinical response to neoadjuvant chemotherapy even after 1 cycle of treatment.  It is very encouraging.  I reviewed her labs including hemogram, complete metabolic profile and they are adequate.  We discussed about adding Neulasta with this cycle due to prior history of neutropenic fever and neutropenic colitis.  I discussed about the rationale behind it.  She agreed.     Regarding her questions of alleviating neoadjuvant chemotherapy for 4 cycles instead of 6 cycle, I recommended to complete 6 cycles of neoadjuvant chemotherapy as a standard of care.  However if she desired to abbreviated at 4 cycles, we can obtain interval mammogram/ultrasound or MRI of the breast to determine the response as accurately as possible.  She will think about it.     Return to clinic in 3 weeks prior to cycle #3 of chemotherapy.     #.  Neutropenic colitis   Clinically resolved after resolution of neutropenia.  Do not recommend any follow-up imaging. Follow clinically.    #.  Heartburn.   I advised her to start omeprazole 20 mg daily for about 7-10 days after chemotherapy.    #.  Alopecia, secondary to chemotherapy.   I told her that there is no good way to prevent the hair loss.  I do not recommend collagen or multivitamin to help a low pressure.      Problem List    1. Malignant neoplasm of nipple of right breast in female, estrogen receptor positive (H)  CC OFFICE VISIT LONG    Infusion Appointment    CC OFFICE VISIT LONG    DISCONTINUED: sodium chloride 0.9% 250 mL infusion    DISCONTINUED: palonosetron injection 0.25 mg (ALOXI)    DISCONTINUED: fosaprepitant 150 mg, dexAMETHasone 12 mg in sodium chloride 0.9% 150 mL    DISCONTINUED: pertuzumab 420 mg in sodium chloride 0.9% 250 mL (PERJETA)    DISCONTINUED: trastuzumab-anns 350 mg in sodium chloride 0.9% 250 mL (KANJINTI)    DISCONTINUED: DOCEtaxeL 120 mg in NaCl 0.9% (non-PVC) 250 mL (TAXOTERE)    DISCONTINUED: CARBOplatin 640 mg in dextrose 5% 250 mL (PARAPLATIN)    DISCONTINUED: pegfilgrastim injection 6 mg (NEULASTA DELIVERY KIT)    DISCONTINUED: sodium chloride flush 20 mL (NS)    DISCONTINUED: heparin lockflush (PF) porcine 300-600 Units   2. Encounter for antineoplastic chemotherapy  CC OFFICE VISIT LONG    Infusion Appointment    CC OFFICE VISIT LONG    DISCONTINUED: sodium chloride 0.9% 250 mL infusion    DISCONTINUED: palonosetron injection 0.25 mg (ALOXI)    DISCONTINUED: fosaprepitant 150 mg, dexAMETHasone 12 mg in sodium chloride 0.9% 150 mL    DISCONTINUED: pertuzumab 420 mg in sodium chloride 0.9% 250 mL (PERJETA)    DISCONTINUED: trastuzumab-anns 350 mg in sodium chloride 0.9% 250 mL (KANJINTI)    DISCONTINUED: DOCEtaxeL 120 mg in NaCl 0.9% (non-PVC) 250 mL (TAXOTERE)    DISCONTINUED: CARBOplatin 640 mg in dextrose 5% 250 mL (PARAPLATIN)    DISCONTINUED: pegfilgrastim injection 6 mg (NEULASTA DELIVERY KIT)    DISCONTINUED: sodium chloride flush 20 mL (NS)    DISCONTINUED: heparin lockflush (PF) porcine 300-600 Units   3. 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 Neutropenic colitis (H)          CC: Ye Lira MD    ______________________________________________________________________________  Diagnosis  4/2021-presented to the emergency room with a mass in her right breast along with  pain for about 3 months or so.  Denies nipple changes or discharge.  Underwent diagnostic mammogram and ultrasound on 2021 and it confirmed right breast mass in the 8 o'clock position, 5 cm from the nipple of 2.2 x 2.8 x 1.4 cm with abnormal appearing right axillary lymph node of 1.7 cm with cortical thickness.  She underwent ultrasound-guided core needle biopsy on the same day and it showed invasive ductal carcinoma, grade 3, ER moderate to strong positive, DC strongly positive, HER-2 positive by IHC.   - She met with Dr. Perdomo from surgery and recommended to consider neoadjuvant chemotherapy to HER-2 positive lymph node positive disease.      Treatment to date  2021-initiated neoadjuvant TCHP.  Required hospitalization with neutropenic fever, neutropenic colitis after cycle #1.  Neulasta added starting cycle #2 chemotherapy.    History of Present Illness    Ms. Joanne Colon presented today accompanied by her friend, Carey. She speaks Czech. She declined  and she requests her friend to interpret for her.     She reported diarrhea stop about 2 days after discharge from the hospital.  No fever.  Mouth sores are completely resolved.  She is worried about how she is going to feel after this treatment.  She is wondering she should do only for cycle instead of 6 cycles of chemotherapy, the way she can visit her children back home.  She noted hair loss and wondering if she could take collagen and multivitamins.  She had nausea, vomiting 1 times after chemotherapy.      Pain Status       Review of Systems    Apart from describing inHPI, the remainder of comprehensive ROS was negative.    Past History  Past Medical History:   Diagnosis Date     Breast cancer (H)          Past Surgical History:   Procedure Laterality Date      SECTION      x two     IR PORT PLACEMENT >5 YEARS  2021     US BIOPSY FINE NEEDLE ASPIRATION LYMPH NODE (BREAST) RIGHT Right 2021     US BREAST CORE BIOPSY RIGHT  Right 4/13/2021       Physical Exam    Recent Vitals 6/3/2021   Height -   Weight 125 lbs 5 oz   BSA (m2) 1.56 m2   /67   Pulse 101   Temp 98.3   Temp src 1   SpO2 98   Some recent data might be hidden       General: alert, awake, not in acute distress  HEENT: Head: Normal, normocephalic, atraumatic.  Eye: Normal external eye, conjunctiva, lids cornea, LISSETH.  Nose: Normal external nose, mucus membranes and septum.  Pharynx: Normal buccal mucosa. Normal pharynx.  Neck / Thyroid: Supple, no masses, nodes, nodules or enlargement.  Lymphatics: No abnormally enlarged lymph nodes.  Chest: Normal chest wall and respirations. Clear to auscultation.  Breasts: significant improvement of the right breast mass from upper outer quadrant.  Heart: S1 S2 RRR, no murmur.   Abdomen: abdomen is soft without significant tenderness, masses, organomegaly or guarding  Extremities: normal strength, tone, and muscle mass  Skin: normal. no rash or abnormalities  CNS: non focal.      Lab Results    Recent Results (from the past 168 hour(s))   Comprehensive Metabolic Panel   Result Value Ref Range    Sodium 136 136 - 145 mmol/L    Potassium 4.1 3.5 - 5.0 mmol/L    Chloride 103 98 - 107 mmol/L    CO2 23 22 - 31 mmol/L    Anion Gap, Calculation 10 5 - 18 mmol/L    Glucose 185 (H) 70 - 125 mg/dL    BUN 10 8 - 22 mg/dL    Creatinine 0.67 0.60 - 1.10 mg/dL    GFR MDRD Af Amer >60 >60 mL/min/1.73m2    GFR MDRD Non Af Amer >60 >60 mL/min/1.73m2    Bilirubin, Total 0.3 0.0 - 1.0 mg/dL    Calcium 9.4 8.5 - 10.5 mg/dL    Protein, Total 7.4 6.0 - 8.0 g/dL    Albumin 3.6 3.5 - 5.0 g/dL    Alkaline Phosphatase 66 45 - 120 U/L    AST 13 0 - 40 U/L    ALT 21 0 - 45 U/L   HM1 (CBC with Diff)   Result Value Ref Range    WBC 9.9 4.0 - 11.0 thou/uL    RBC 3.91 3.80 - 5.40 mill/uL    Hemoglobin 11.6 (L) 12.0 - 16.0 g/dL    Hematocrit 35.0 35.0 - 47.0 %    MCV 90 80 - 100 fL    MCH 29.7 27.0 - 34.0 pg    MCHC 33.1 32.0 - 36.0 g/dL    RDW 13.0 11.0 - 14.5 %     Platelets 365 140 - 440 thou/uL    MPV 10.0 8.5 - 12.5 fL    Neutrophils % 90 (H) 50 - 70 %    Lymphocytes % 9 (L) 20 - 40 %    Monocytes % 1 (L) 2 - 10 %    Eosinophils % 0 0 - 6 %    Basophils % 0 0 - 2 %    Immature Granulocyte % 1 (H) <=0 %    Neutrophils Absolute 8.9 (H) 2.0 - 7.7 thou/uL    Lymphocytes Absolute 0.9 0.8 - 4.4 thou/uL    Monocytes Absolute 0.1 0.0 - 0.9 thou/uL    Eosinophils Absolute 0.0 0.0 - 0.4 thou/uL    Basophils Absolute 0.0 0.0 - 0.2 thou/uL    Immature Granulocyte Absolute 0.1 (H) <=0.0 thou/uL       Imaging    Xr Chest 1 View    Result Date: 5/23/2021  EXAM: CHEST SINGLE VIEW PORTABLE LOCATION: [facility] DATE/TIME: 5/23/2021 2:54 AM INDICATION: Fever. Neutropenia. Sore throat. COMPARISON: None. FINDINGS: The lungs are clear. Normal size cardiac silhouette. Left anterior chest wall port with catheter entering the left internal jugular vein and distal tip in the superior vena cava.     No evidence of active cardiopulmonary disease.     Ct Abdomen Pelvis Without Oral With Iv Contrast    Result Date: 5/23/2021  EXAM: CT ABDOMEN PELVIS WO ORAL W IV CONTRAST LOCATION: [facility] DATE/TIME: 5/23/2021 2:56 AM INDICATION: Abdominal infection suspected right sided abdominal pain, febrile neutropenia, diarrhea, breast cancer COMPARISON: None. TECHNIQUE: CT scan of the abdomen and pelvis was performed following injection of IV contrast. Multiplanar reformats were obtained. Dose reduction techniques were used. CONTRAST: Iopamidol (Isovue-370) 100mL FINDINGS: LOWER CHEST: Lung bases clear. HEPATOBILIARY: Several indeterminate hypodense right hepatic lesions. 10 mm lesion series 3 image 35 and 7 mm lesion image 31. PANCREAS: Normal. SPLEEN: Normal. ADRENAL GLANDS: Normal. KIDNEYS/BLADDER: Normal. BOWEL: Wall thickening of the cecum and right colon. Normal caliber bowel. Appendix not visualized. No inflammation expected region of the appendix.  Underdistention versus wall thickening rectosigmoid colon. LYMPH NODES: Small right lower quadrant lymph nodes. VASCULATURE: Unremarkable. PELVIC ORGANS: Normal. MUSCULOSKELETAL: Normal.     1.  Wall thickening of the cecum and right colon consistent with colitis. Underdistention versus wall thickening of the rectosigmoid colon. 2.  2 small indeterminant hypodense right hepatic lesions. Comparison to any previous imaging recommended. If none available consider nonurgent MR follow-up for characterization.        Signed by: Chelly Sanchez MD

## 2021-06-25 NOTE — PROGRESS NOTES
Tolerated chemo today very well.   ordered the neulasta injector to be part of her treatment.  I reviewed education and instructions with her and her interpretor.  All questions answered and printed information sent home with both of them.  She was admitted after her last cycle to the hospital due to neutropenia.

## 2021-06-25 NOTE — TELEPHONE ENCOUNTER
Joanne's sister, Shadi, calls today to report that Joanne has been having liquid diarrhea every 5 minutes since Sunday and feels dizzy. She is D6C2 TCHP. She was admitted with neutropenic fever and colitis after C1 and did receive neulasta after C2. Since she is symptomatic and having frequent diarrhea, RN writer advised her to go to the ED. Shadi will bring her sister to the  ED now via car, and the ED provider was given verbal report. Emily Gould

## 2021-06-25 NOTE — TELEPHONE ENCOUNTER
"Called Joanne with assistance of [employer]  [phone], to check in. She was discharged from the hospital and says that she is feeling\"much better, much stronger\" than last week. She has a clinic apt on 6/3 to follow up with Dr. Sanchez/chemo. Shared with her about [facility] financial patrick. She is interested in pursuing and would like gas cards to help reimburse her sister's family to take her to her medical appointments. Writer will complete the application and sent to her for signature. She can then mail it in the addressed stamped envelope that writer will provide to her. She will be eligible for [employer] through [employer] on 6/26. She understands that writer will be in current position for another couple of weeks and will then transition to another position and will not longer be available at current extension. She was assured that Dr. Sanchez's nurse would help facilitate supportive services in the future. Will continue to support while in current position. She expressed appreciation for all of the assistance.  "

## 2021-06-25 NOTE — PROGRESS NOTES
"Oncology Rooming Note    06/03/21 8:43 AM    Joanne Colon is a 36 y.o. female who presents for:    Chief Complaint   Patient presents with     HE Cancer     Breast Cancer       Initial Vitals: /67   Pulse (!) 101   Temp 98.3  F (36.8  C) (Oral)   Wt 125 lb 4.8 oz (56.8 kg)   SpO2 98%   BMI 23.68 kg/m       Estimated body mass index is 23.68 kg/m  as calculated from the following:    Height as of 5/22/21: 5' 1\" (1.549 m).    Weight as of this encounter: 125 lb 4.8 oz (56.8 kg).     Body surface area is 1.56 meters squared.      Allergies reviewed: Yes  Medications reviewed: Yes    Refills needed: No  Pharmacy name entered into EPIC: @PrefferedPHARMACY@      Clinical concerns: concerns - fatigue, heartburn, intermittently feels like something stuck in throat, nausea, vomiting (X1 day 3), diarrhea after chemo     Denies pain.    Stress increased. Wondering how she is going to feel after next cycle of chemo.      Akila Klein RN      "

## 2021-06-26 DIAGNOSIS — Z51.11 ENCOUNTER FOR ANTINEOPLASTIC CHEMOTHERAPY: ICD-10-CM

## 2021-06-26 DIAGNOSIS — C50.011 MALIGNANT NEOPLASM OF NIPPLE OF RIGHT BREAST IN FEMALE, ESTROGEN RECEPTOR POSITIVE (H): Primary | ICD-10-CM

## 2021-06-26 DIAGNOSIS — Z17.0 MALIGNANT NEOPLASM OF NIPPLE OF RIGHT BREAST IN FEMALE, ESTROGEN RECEPTOR POSITIVE (H): Primary | ICD-10-CM

## 2021-06-26 NOTE — TELEPHONE ENCOUNTER
After speaking with Dr. Sanchez regarding this pts MRI report, she said it doesn't look like anything to worry about but that follow up would be recommended in 6 mos. I then called Yumiko back and relayed Dr. Sanchez's response. Yumiko repeated it back to me and I confirmed that the information was correct. I also confirmed that they should keep the appt with ST on Thursday. Yumiko responded, no question, they would keep the appt. JAMEY Vargas, OCN, CBCN

## 2021-06-26 NOTE — PATIENT INSTRUCTIONS - HE
Prilosec 20 mg daily by mouth for about 7-10 days with chemotherapy. It is over the counter medicine.

## 2021-06-26 NOTE — TELEPHONE ENCOUNTER
"Friend Yumiko called and is reporting that she spoke with Akila regarding the MRI that Joanne had on Sat and that they will get results on Thurs. However, Yumiko is requesting some hint at the results so that they can be more prepared for the visit on Thursday. She is asking if they can be told if it is \"good or bad\". I advised that I would send an inbasket msg to Dr. Sanchez and when she responds I will call her back. JAMEY Vargas, OCN, CBCN  "

## 2021-07-02 NOTE — H&P
H&P by Ester Escobar PA-C at 4/28/2021  1:30 PM     Author: Ester Escobar PA-C Service: Interventional Radiology Author Type: Physician Assistant    Filed: 4/28/2021  2:30 PM Date of Service: 4/28/2021  1:30 PM Status: Signed    : Ester Escobar PA-C (Physician Assistant)         Interventional Radiology - Pre-Procedure Note:  4/28/2021    Procedure Requested: Port placement  Requested by: Chelly Sanchez MD    History and Physical Reviewed: H&P documented within 30 days (by Chelly Sanchez MD on 4/22/21).  I have personally reviewed the patient's medical history and have updated the medical record as necessary.    Brief HPI: Joanne Colon is a 36 y.o. old female with right breast cancer (invasive ductal carcinoma). Presenting for port placement.       NPO: midnight.  ANTICOAGULANTS: none  ANTIBIOTICS: ancef ordered    ALLERGIES  Patient has no known allergies.    LABS:  No results found for: INR  Hemoglobin (g/dL)   Date Value   04/01/2021 14.0     Platelets (thou/uL)   Date Value   04/01/2021 233     Creatinine (mg/dL)   Date Value   04/01/2021 0.67     Potassium (mmol/L)   Date Value   04/01/2021 3.4 (L)       EXAM:  BP (P) 117/75   Pulse (P) 82   Temp (P) 98  F (36.7  C) (Oral)   Resp (P) 16   Ht 5' (1.524 m)   Wt 129 lb (58.5 kg)   SpO2 (P) 100%   BMI 25.19 kg/m    General: Stable. In no acute distress.  Neuro: A&O x 3. Moves all extremities equally.  Resp: Lungs clear to auscultation bilaterally.  Cardio: S1S2 and reg, without murmur, clicks or rubs  Skin: Without excoriations, ecchymosis, erythema, lesions or open sores on upper chest and neck.    Pre-Sedation Assessment:  Mallampati Airway Classification: Class 2: upper half of tonsil fossa visible  Previous reaction to anesthesia/sedation: no  Sedation plan based on assessment: Moderate  ASA Classification: ASA 3 - Patient with moderate systemic disease with functional limitations  Code Status: FULL  CODE      ASSESSMENT/PLAN:   Right breast cancer.    Port placement with sedation.    Procedure, risks/benefits, and sedation reviewed with pt/family with assistance of Maltese. All questions answered. OK to proceed with above radiology procedure.     Discussed LEFT sided port placement.       Ester Escobar  Interventional Radiology

## 2021-07-07 NOTE — PROGRESS NOTES
Application to [employer], [employer], was initiated on patient's behalf via the portal. Equal split of gas and grocery cards were requested, if patrick is approved.  [employer] will contact patient to relay update when patrick is approved.

## 2021-07-07 NOTE — TELEPHONE ENCOUNTER
Called Joanne with the assistance of [place]  [id number]. Relayed that she is eligible for [employer]. Writer will enter the application via the portal and will request an equal split of gas and grocery cards. She was very apprecitaive of the assistance as she wants to help offset expenses to her sister's family that she stays with. She relayed that her care is going well. She is aware that further support needs can be directed to Akila, nurse working with Dr. Sanchez, by expressing need at clinic or by calling the main number (we spoke earlier this month and she is aware of writer's transition to a new clinic role) Support and encouragement provided.

## 2021-07-15 ENCOUNTER — INFUSION THERAPY VISIT (OUTPATIENT)
Dept: INFUSION THERAPY | Facility: CLINIC | Age: 36
End: 2021-07-15
Attending: INTERNAL MEDICINE
Payer: MEDICAID

## 2021-07-15 ENCOUNTER — LAB (OUTPATIENT)
Dept: INFUSION THERAPY | Facility: CLINIC | Age: 36
End: 2021-07-15
Payer: MEDICAID

## 2021-07-15 ENCOUNTER — ONCOLOGY VISIT (OUTPATIENT)
Dept: ONCOLOGY | Facility: CLINIC | Age: 36
End: 2021-07-15
Payer: MEDICAID

## 2021-07-15 VITALS
HEART RATE: 98 BPM | SYSTOLIC BLOOD PRESSURE: 114 MMHG | WEIGHT: 123.1 LBS | OXYGEN SATURATION: 99 % | TEMPERATURE: 97.9 F | RESPIRATION RATE: 16 BRPM | BODY MASS INDEX: 24.04 KG/M2 | DIASTOLIC BLOOD PRESSURE: 65 MMHG

## 2021-07-15 DIAGNOSIS — Z51.11 ENCOUNTER FOR ANTINEOPLASTIC CHEMOTHERAPY: ICD-10-CM

## 2021-07-15 DIAGNOSIS — K59.1 FUNCTIONAL DIARRHEA: ICD-10-CM

## 2021-07-15 DIAGNOSIS — C50.011 MALIGNANT NEOPLASM OF NIPPLE OF RIGHT BREAST IN FEMALE, ESTROGEN RECEPTOR POSITIVE (H): ICD-10-CM

## 2021-07-15 DIAGNOSIS — Z17.0 MALIGNANT NEOPLASM OF NIPPLE OF RIGHT BREAST IN FEMALE, ESTROGEN RECEPTOR POSITIVE (H): ICD-10-CM

## 2021-07-15 DIAGNOSIS — Z17.0 MALIGNANT NEOPLASM OF NIPPLE OF RIGHT BREAST IN FEMALE, ESTROGEN RECEPTOR POSITIVE (H): Primary | ICD-10-CM

## 2021-07-15 DIAGNOSIS — C50.011 MALIGNANT NEOPLASM OF NIPPLE OF RIGHT BREAST IN FEMALE, ESTROGEN RECEPTOR POSITIVE (H): Primary | ICD-10-CM

## 2021-07-15 DIAGNOSIS — Z51.11 ENCOUNTER FOR ANTINEOPLASTIC CHEMOTHERAPY: Primary | ICD-10-CM

## 2021-07-15 DIAGNOSIS — R11.2 CHEMOTHERAPY INDUCED NAUSEA AND VOMITING: ICD-10-CM

## 2021-07-15 DIAGNOSIS — T45.1X5A CHEMOTHERAPY INDUCED NAUSEA AND VOMITING: ICD-10-CM

## 2021-07-15 LAB
ALBUMIN SERPL-MCNC: 4.1 G/DL (ref 3.5–5)
ALP SERPL-CCNC: 68 U/L (ref 45–120)
ALT SERPL W P-5'-P-CCNC: 17 U/L (ref 0–45)
ANION GAP SERPL CALCULATED.3IONS-SCNC: 7 MMOL/L (ref 5–18)
AST SERPL W P-5'-P-CCNC: 16 U/L (ref 0–40)
BASOPHILS # BLD AUTO: 0 10E3/UL (ref 0–0.2)
BASOPHILS NFR BLD AUTO: 0 %
BILIRUB SERPL-MCNC: 0.5 MG/DL (ref 0–1)
BUN SERPL-MCNC: 11 MG/DL (ref 8–22)
CALCIUM SERPL-MCNC: 9.6 MG/DL (ref 8.5–10.5)
CHLORIDE BLD-SCNC: 108 MMOL/L (ref 98–107)
CO2 SERPL-SCNC: 23 MMOL/L (ref 22–31)
CREAT SERPL-MCNC: 0.61 MG/DL (ref 0.6–1.1)
EOSINOPHIL # BLD AUTO: 0 10E3/UL (ref 0–0.7)
EOSINOPHIL NFR BLD AUTO: 0 %
ERYTHROCYTE [DISTWIDTH] IN BLOOD BY AUTOMATED COUNT: 16.9 % (ref 10–15)
GFR SERPL CREATININE-BSD FRML MDRD: >90 ML/MIN/1.73M2
GLUCOSE BLD-MCNC: 126 MG/DL (ref 70–125)
HCT VFR BLD AUTO: 34.6 % (ref 35–47)
HGB BLD-MCNC: 11.1 G/DL (ref 11.7–15.7)
IMM GRANULOCYTES # BLD: 0 10E3/UL
IMM GRANULOCYTES NFR BLD: 0 %
LYMPHOCYTES # BLD AUTO: 1.6 10E3/UL (ref 0.8–5.3)
LYMPHOCYTES NFR BLD AUTO: 20 %
MCH RBC QN AUTO: 30.9 PG (ref 26.5–33)
MCHC RBC AUTO-ENTMCNC: 32.1 G/DL (ref 31.5–36.5)
MCV RBC AUTO: 96 FL (ref 78–100)
MONOCYTES # BLD AUTO: 0.4 10E3/UL (ref 0–1.3)
MONOCYTES NFR BLD AUTO: 5 %
NEUTROPHILS # BLD AUTO: 5.6 10E3/UL (ref 1.6–8.3)
NEUTROPHILS NFR BLD AUTO: 75 %
NRBC # BLD AUTO: 0 10E3/UL
NRBC BLD AUTO-RTO: 0 /100
PLATELET # BLD AUTO: 189 10E3/UL (ref 150–450)
POTASSIUM BLD-SCNC: 3.9 MMOL/L (ref 3.5–5)
PROT SERPL-MCNC: 7.2 G/DL (ref 6–8)
RBC # BLD AUTO: 3.59 10E6/UL (ref 3.8–5.2)
SODIUM SERPL-SCNC: 138 MMOL/L (ref 136–145)
WBC # BLD AUTO: 7.6 10E3/UL (ref 4–11)

## 2021-07-15 PROCEDURE — 82040 ASSAY OF SERUM ALBUMIN: CPT | Performed by: INTERNAL MEDICINE

## 2021-07-15 PROCEDURE — 258N000003 HC RX IP 258 OP 636: Performed by: INTERNAL MEDICINE

## 2021-07-15 PROCEDURE — 99214 OFFICE O/P EST MOD 30 MIN: CPT | Performed by: NURSE PRACTITIONER

## 2021-07-15 PROCEDURE — 96375 TX/PRO/DX INJ NEW DRUG ADDON: CPT

## 2021-07-15 PROCEDURE — 96413 CHEMO IV INFUSION 1 HR: CPT

## 2021-07-15 PROCEDURE — G0463 HOSPITAL OUTPT CLINIC VISIT: HCPCS | Mod: 25

## 2021-07-15 PROCEDURE — 96417 CHEMO IV INFUS EACH ADDL SEQ: CPT

## 2021-07-15 PROCEDURE — 96367 TX/PROPH/DG ADDL SEQ IV INF: CPT

## 2021-07-15 PROCEDURE — 36591 DRAW BLOOD OFF VENOUS DEVICE: CPT | Performed by: INTERNAL MEDICINE

## 2021-07-15 PROCEDURE — 250N000011 HC RX IP 250 OP 636: Performed by: INTERNAL MEDICINE

## 2021-07-15 PROCEDURE — 85025 COMPLETE CBC W/AUTO DIFF WBC: CPT | Performed by: INTERNAL MEDICINE

## 2021-07-15 RX ORDER — FAMOTIDINE 20 MG/1
20 TABLET, FILM COATED ORAL 2 TIMES DAILY PRN
COMMUNITY
End: 2021-07-15

## 2021-07-15 RX ORDER — PALONOSETRON 0.05 MG/ML
0.25 INJECTION, SOLUTION INTRAVENOUS ONCE
Status: CANCELLED
Start: 2021-07-15

## 2021-07-15 RX ORDER — DIPHENHYDRAMINE HCL 50 MG
50 CAPSULE ORAL
Status: DISCONTINUED | OUTPATIENT
Start: 2021-07-15 | End: 2021-07-15 | Stop reason: HOSPADM

## 2021-07-15 RX ORDER — LOPERAMIDE HCL 2 MG
2 CAPSULE ORAL 4 TIMES DAILY PRN
COMMUNITY
End: 2021-11-11

## 2021-07-15 RX ORDER — DEXAMETHASONE 4 MG/1
4 TABLET ORAL 2 TIMES DAILY WITH MEALS
COMMUNITY
End: 2021-11-11

## 2021-07-15 RX ORDER — HEPARIN SODIUM (PORCINE) LOCK FLUSH IV SOLN 100 UNIT/ML 100 UNIT/ML
5 SOLUTION INTRAVENOUS
Status: CANCELLED | OUTPATIENT
Start: 2021-07-15

## 2021-07-15 RX ORDER — EPINEPHRINE 1 MG/ML
0.3 INJECTION, SOLUTION INTRAMUSCULAR; SUBCUTANEOUS EVERY 5 MIN PRN
Status: DISCONTINUED | OUTPATIENT
Start: 2021-07-15 | End: 2021-07-15 | Stop reason: HOSPADM

## 2021-07-15 RX ORDER — ONDANSETRON 4 MG/1
4 TABLET, FILM COATED ORAL EVERY 8 HOURS PRN
Qty: 20 TABLET | Refills: 1 | Status: SHIPPED | OUTPATIENT
Start: 2021-07-15 | End: 2021-11-11

## 2021-07-15 RX ORDER — EPINEPHRINE 1 MG/ML
0.3 INJECTION, SOLUTION INTRAMUSCULAR; SUBCUTANEOUS EVERY 5 MIN PRN
Status: CANCELLED | OUTPATIENT
Start: 2021-07-15

## 2021-07-15 RX ORDER — ALBUTEROL SULFATE 5 MG/ML
2.5 SOLUTION RESPIRATORY (INHALATION)
Status: CANCELLED | OUTPATIENT
Start: 2021-07-15

## 2021-07-15 RX ORDER — ALBUTEROL SULFATE 5 MG/ML
2.5 SOLUTION RESPIRATORY (INHALATION)
Status: DISCONTINUED | OUTPATIENT
Start: 2021-07-15 | End: 2021-07-15 | Stop reason: HOSPADM

## 2021-07-15 RX ORDER — LORAZEPAM 2 MG/ML
0.5 INJECTION INTRAMUSCULAR EVERY 4 HOURS PRN
Status: CANCELLED
Start: 2021-07-15

## 2021-07-15 RX ORDER — ACETAMINOPHEN 325 MG/1
650 TABLET ORAL
Status: DISCONTINUED | OUTPATIENT
Start: 2021-07-15 | End: 2021-07-15 | Stop reason: HOSPADM

## 2021-07-15 RX ORDER — ALBUTEROL SULFATE 90 UG/1
1-2 AEROSOL, METERED RESPIRATORY (INHALATION)
Status: CANCELLED
Start: 2021-07-15

## 2021-07-15 RX ORDER — DIPHENHYDRAMINE HYDROCHLORIDE 50 MG/ML
50 INJECTION INTRAMUSCULAR; INTRAVENOUS
Status: CANCELLED
Start: 2021-07-15

## 2021-07-15 RX ORDER — HEPARIN SODIUM,PORCINE 10 UNIT/ML
5 VIAL (ML) INTRAVENOUS
Status: DISCONTINUED | OUTPATIENT
Start: 2021-07-15 | End: 2021-07-15 | Stop reason: HOSPADM

## 2021-07-15 RX ORDER — MEPERIDINE HYDROCHLORIDE 50 MG/ML
25 INJECTION INTRAMUSCULAR; INTRAVENOUS; SUBCUTANEOUS EVERY 30 MIN PRN
Status: CANCELLED | OUTPATIENT
Start: 2021-07-15

## 2021-07-15 RX ORDER — HEPARIN SODIUM (PORCINE) LOCK FLUSH IV SOLN 100 UNIT/ML 100 UNIT/ML
5 SOLUTION INTRAVENOUS
Status: DISCONTINUED | OUTPATIENT
Start: 2021-07-15 | End: 2021-07-15 | Stop reason: HOSPADM

## 2021-07-15 RX ORDER — MEPERIDINE HYDROCHLORIDE 50 MG/ML
25 INJECTION INTRAMUSCULAR; INTRAVENOUS; SUBCUTANEOUS EVERY 30 MIN PRN
Status: DISCONTINUED | OUTPATIENT
Start: 2021-07-15 | End: 2021-07-15 | Stop reason: HOSPADM

## 2021-07-15 RX ORDER — NALOXONE HYDROCHLORIDE 0.4 MG/ML
0.2 INJECTION, SOLUTION INTRAMUSCULAR; INTRAVENOUS; SUBCUTANEOUS
Status: CANCELLED | OUTPATIENT
Start: 2021-07-15

## 2021-07-15 RX ORDER — ALBUTEROL SULFATE 90 UG/1
1-2 AEROSOL, METERED RESPIRATORY (INHALATION)
Status: DISCONTINUED | OUTPATIENT
Start: 2021-07-15 | End: 2021-07-15 | Stop reason: HOSPADM

## 2021-07-15 RX ORDER — LIDOCAINE AND PRILOCAINE 25; 25 MG/G; MG/G
CREAM TOPICAL PRN
COMMUNITY
End: 2021-10-18

## 2021-07-15 RX ORDER — METHYLPREDNISOLONE SODIUM SUCCINATE 125 MG/2ML
125 INJECTION, POWDER, LYOPHILIZED, FOR SOLUTION INTRAMUSCULAR; INTRAVENOUS
Status: CANCELLED
Start: 2021-07-15

## 2021-07-15 RX ORDER — CALCIUM CARBONATE 500 MG/1
1 TABLET, CHEWABLE ORAL 2 TIMES DAILY PRN
COMMUNITY
End: 2021-11-11

## 2021-07-15 RX ORDER — HEPARIN SODIUM,PORCINE 10 UNIT/ML
5 VIAL (ML) INTRAVENOUS
Status: CANCELLED | OUTPATIENT
Start: 2021-07-15

## 2021-07-15 RX ORDER — ACETAMINOPHEN 325 MG/1
650 TABLET ORAL
Status: CANCELLED
Start: 2021-07-15

## 2021-07-15 RX ORDER — PALONOSETRON 0.05 MG/ML
0.25 INJECTION, SOLUTION INTRAVENOUS ONCE
Status: COMPLETED | OUTPATIENT
Start: 2021-07-15 | End: 2021-07-15

## 2021-07-15 RX ORDER — METHYLPREDNISOLONE SODIUM SUCCINATE 125 MG/2ML
125 INJECTION, POWDER, LYOPHILIZED, FOR SOLUTION INTRAMUSCULAR; INTRAVENOUS
Status: DISCONTINUED | OUTPATIENT
Start: 2021-07-15 | End: 2021-07-15 | Stop reason: HOSPADM

## 2021-07-15 RX ORDER — NALOXONE HYDROCHLORIDE 0.4 MG/ML
0.2 INJECTION, SOLUTION INTRAMUSCULAR; INTRAVENOUS; SUBCUTANEOUS
Status: DISCONTINUED | OUTPATIENT
Start: 2021-07-15 | End: 2021-07-15 | Stop reason: HOSPADM

## 2021-07-15 RX ORDER — DIPHENHYDRAMINE HCL 50 MG
50 CAPSULE ORAL
Status: CANCELLED | OUTPATIENT
Start: 2021-07-15

## 2021-07-15 RX ORDER — DIPHENHYDRAMINE HYDROCHLORIDE 50 MG/ML
50 INJECTION INTRAMUSCULAR; INTRAVENOUS
Status: DISCONTINUED | OUTPATIENT
Start: 2021-07-15 | End: 2021-07-15 | Stop reason: HOSPADM

## 2021-07-15 RX ORDER — LORAZEPAM 2 MG/ML
0.5 INJECTION INTRAMUSCULAR EVERY 4 HOURS PRN
Status: DISCONTINUED | OUTPATIENT
Start: 2021-07-15 | End: 2021-07-15 | Stop reason: HOSPADM

## 2021-07-15 RX ADMIN — SODIUM CHLORIDE 348 MG: 9 INJECTION, SOLUTION INTRAVENOUS at 11:31

## 2021-07-15 RX ADMIN — DOCETAXEL 118 MG: 160 INJECTION, SOLUTION INTRAVENOUS at 12:05

## 2021-07-15 RX ADMIN — FOSAPREPITANT: 150 INJECTION, POWDER, LYOPHILIZED, FOR SOLUTION INTRAVENOUS at 10:23

## 2021-07-15 RX ADMIN — PERTUZUMAB 420 MG: 30 INJECTION, SOLUTION, CONCENTRATE INTRAVENOUS at 11:00

## 2021-07-15 RX ADMIN — CARBOPLATIN 675 MG: 10 INJECTION, SOLUTION INTRAVENOUS at 13:02

## 2021-07-15 RX ADMIN — HEPARIN SODIUM (PORCINE) LOCK FLUSH IV SOLN 100 UNIT/ML 5 ML: 100 SOLUTION at 13:42

## 2021-07-15 RX ADMIN — PALONOSETRON 0.25 MG: 0.05 INJECTION, SOLUTION INTRAVENOUS at 10:03

## 2021-07-15 NOTE — PROGRESS NOTES
[facility] Hematology and Oncology Progress Note    Patient: Joanne Colon  MRN: [id number]  Date of Service: Jul 15, 2021         Reason for Visit:     D1C4 [facility] chemotherapy     Assessment and Plan:    1. cT2 N1 M0 invasive ductal carcinoma (R) breast, grade 3, ER positive, NM positive, HER-2 positive.    36 year old non-English speaking woman.    07/15/21:    Ms. Joanne Colon presented today accompanied by an Belarusian .  She looks and feels quite good today.  However, she continues to note diarrhea for about 5 days after each chemotherapy session - managed with OTC Imodium once daily.  She also continues to note nausea and vomiting starting about 2 days after each dose of chemotherapy and lasting for ~5 days.  She continues to awaken with heartburn each morning - she has not been taking the omeprazole 20 mg daily.  Weight stable.  No fever.  No mouth sores.  breast mass no longer palpable.  She denies cough, fever, chills, unusual headaches, visual or mentation disturbance, bladder issues, rash, neuropathies.    CBC - WBC, differential and Plts WNL.  HgB quite stable @ 11.1.    CMP - basically WNL.    Nausea/vomiting:    Compazine apparently not very effective.    Rx for Zofran 4 mg every 8 hours Eprescribed to her pharmacy.    Instructed on scheduling/using preventatively for the 1st few days after each cycle chemotherapy.    She is already receiving Emend as premed.    She may use Compazine in between the Zofran if needed.    GERD:    Instructed to manage morning GERD with taking OTC H2 blocker at bedtime for prophylaxis as long as she is receiving chemotherapy.      Diarrhea:    Minimal    Noted for ~5 days after each chemotherapy session    Reviewed OTC imodium use (2-tabs with 1st episode diarrhea in a 24 hour period, followed by 1-tab with each subsequent episode diarrhea).    Other than some manageable, quite usual side effects of current therapy, she is tolerating neoadjuvant chemotherapy  acceptably well with a good clinical response.  Hematologies and metabolic panel acceptable.       Proceed with C4 TCHP neoadjuvant chemotherapy.    Anticipating 6 cycles TCHP chemotherapy, she is now accepting of 6 versus 4 cycles.    Instructed that she needs to contact us with a fever >100.4F as antibiotics or even hospitalization may be indicated..      08/05/21 - f/u C5 neoadjuvant TCHP chemotherapy.    Oncologic History:    1. cT2 N1 M0 invasive ductal carcinoma (R) breast, grade 3, ER positive, MD positive, HER-2 positive.    2021, April - presented to the ED with a 3-month history of a painful mass in her right breast.  No nipple changes or discharge.      04/13/21 diagnostic mammogram and ultrasound - confirmed a 2.2 x 2.8 x 1.4 cm right breast mass in the 8 o'clock position, 5 cm from the nipple with abnormal appearing right axillary lymph node of 1.7 cm with cortical thickness.      04/13/21 US-guided core needle biopsy - confirmed invasive ductal carcinoma, grade 3, ER moderate to strong positive, MD strongly positive, HER-2 positive by IHC.    Consult with Dr. Perdomo, surgeon - recommended neoadjuvant chemotherapy.    05/13/2021 - D1C1 neoadjuvant TCHP chemotherapy.      Required hospitalization with neutropenic fever, neutropenic colitis after cycle #1.      Neulasta added starting cycle #2 chemotherapy.     ECOG Performance - 0-1    Pain:    Pain Score: No Pain (0)    Encounter Diagnoses:    Problem List Items Addressed This Visit        Other    Malignant neoplasm of nipple of right breast in female, estrogen receptor positive (H) - Primary    Relevant Orders    CBC with platelets differential    Comprehensive metabolic panel    Encounter for antineoplastic chemotherapy    Relevant Orders    CBC with platelets differential    Comprehensive metabolic panel             Total Time 38 minutes    Td Padilla CNP     CC: Ye Lira MD    ______________________________________________________________________________    Review of systems:    No fever or night sweats.  No loss of weight.  No lumps or bumps anywhere.  No unusual headaches or eyesight issues.  No dizziness.  No bleeding from the nose.  No sores in the mouth. No problems with swallowing.  No chest pain. No shortness of breath. No cough.    Notes diarrhea for ~5 days after each chemotherapy session.    Notes nausea and vomiting starting about 2 days after each dose of chemotherapy and lasting for ~5 days.      Notes heartburn each morning.  No abdominal pain.   No blood in stool or black colored stools.  No problems passing urine.  No numbness or tingling in hands or feet.  No skin rashes.    A 14 point review of systems is otherwise negative.    Past History:    Past Medical History:   Diagnosis Date     Breast cancer (H)        Past Surgical History:   Procedure Laterality Date      SECTION      x two     IR CHEST PORT PLACEMENT > 5 YRS OF AGE  2021     IR PORT PLACEMENT >5 YEARS  2021     US BIOPSY FINE NEEDLE ASPIRATION LYMPH NODE (BREAST) RIGHT Right 2021     US BREAST CORE BIOPSY RIGHT Right 2021     Physical Exam:    /65 (BP Location: Right arm)   Pulse 98   Temp 97.9  F (36.6  C)   Resp 16   Wt 55.8 kg (123 lb 1.6 oz)   SpO2 99%   BMI 24.04 kg/m        GENERAL:  Alert and oriented.  Seated comfortably.  No distress.    HEENT:  Atraumatic and normocephalic.  LISSETH.  EOMI.  No pallor.  No icterus.  No mucosal lesions.    LYMPH NODES:  No palpable, cervical, axillary or inguinal lymphadenopathy.    CHEST:   Lungs clear to auscultation bilaterally.    CVS:    S1 and S2 heard.  Regular rate and rhythm.  No murmur, gallop or rub heard.  No peripheral edema.    ABDOMEN:   Soft. Not tender. Not distended. No palpable hepatomegaly or splenomegaly, masses or ascites.  Bowel sounds heard.    EXTREMITIES:  Warm.    SKIN:    No rash, bruising or purpura  noted.    Lab Results:     Reviewed with patient.    Recent Results (from the past 168 hour(s))   Comprehensive metabolic panel   Result Value Ref Range    Sodium 138 136 - 145 mmol/L    Potassium 3.9 3.5 - 5.0 mmol/L    Chloride 108 (H) 98 - 107 mmol/L    Carbon Dioxide (CO2) 23 22 - 31 mmol/L    Anion Gap 7 5 - 18 mmol/L    Urea Nitrogen 11 8 - 22 mg/dL    Creatinine 0.61 0.60 - 1.10 mg/dL    Calcium 9.6 8.5 - 10.5 mg/dL    Glucose 126 (H) 70 - 125 mg/dL    Alkaline Phosphatase 68 45 - 120 U/L    AST 16 0 - 40 U/L    ALT 17 0 - 45 U/L    Protein Total 7.2 6.0 - 8.0 g/dL    Albumin 4.1 3.5 - 5.0 g/dL    Bilirubin Total 0.5 0.0 - 1.0 mg/dL    GFR Estimate >90 >60 mL/min/1.73m2   CBC with platelets and differential   Result Value Ref Range    WBC Count 7.6 4.0 - 11.0 10e3/uL    RBC Count 3.59 (L) 3.80 - 5.20 10e6/uL    Hemoglobin 11.1 (L) 11.7 - 15.7 g/dL    Hematocrit 34.6 (L) 35.0 - 47.0 %    MCV 96 78 - 100 fL    MCH 30.9 26.5 - 33.0 pg    MCHC 32.1 31.5 - 36.5 g/dL    RDW 16.9 (H) 10.0 - 15.0 %    Platelet Count 189 150 - 450 10e3/uL    % Neutrophils 75 %    % Lymphocytes 20 %    % Monocytes 5 %    % Eosinophils 0 %    % Basophils 0 %    % Immature Granulocytes 0 %    NRBCs per 100 WBC 0 <1 /100    Absolute Neutrophils 5.6 1.6 - 8.3 10e3/uL    Absolute Lymphocytes 1.6 0.8 - 5.3 10e3/uL    Absolute Monocytes 0.4 0.0 - 1.3 10e3/uL    Absolute Eosinophils 0.0 0.0 - 0.7 10e3/uL    Absolute Basophils 0.0 0.0 - 0.2 10e3/uL    Absolute Immature Granulocytes 0.0 <=0.0 10e3/uL    Absolute NRBCs 0.0 10e3/uL       Imaging:    No new imaging.

## 2021-07-15 NOTE — PROGRESS NOTES
Infusion Nursing Note:  Joanne Colon presents today for chemo.      Patient seen by provider today: Yes   present during visit today: Patient refused  services and a waiver form has been signed. She had her friend interpret over the phone as needed.     Patient did not meet criteria for an asymptomatic covid-19 PCR test in infusion today.     Intravenous Access:  Implanted Port.    Treatment Conditions:  Results reviewed, labs MET treatment parameters, ok to proceed with treatment.      Post Infusion Assessment:  Patient tolerated infusion without incident.       Discharge Plan:   Patient and/or family verbalized understanding of discharge instructions and all questions answered.      EVELYN SPEARS RN

## 2021-07-15 NOTE — PATIENT INSTRUCTIONS
Recent Results (from the past 24 hour(s))   Comprehensive metabolic panel    Collection Time: 07/15/21  8:27 AM   Result Value Ref Range    Sodium 138 136 - 145 mmol/L    Potassium 3.9 3.5 - 5.0 mmol/L    Chloride 108 (H) 98 - 107 mmol/L    Carbon Dioxide (CO2) 23 22 - 31 mmol/L    Anion Gap 7 5 - 18 mmol/L    Urea Nitrogen 11 8 - 22 mg/dL    Creatinine 0.61 0.60 - 1.10 mg/dL    Calcium 9.6 8.5 - 10.5 mg/dL    Glucose 126 (H) 70 - 125 mg/dL    Alkaline Phosphatase 68 45 - 120 U/L    AST 16 0 - 40 U/L    ALT 17 0 - 45 U/L    Protein Total 7.2 6.0 - 8.0 g/dL    Albumin 4.1 3.5 - 5.0 g/dL    Bilirubin Total 0.5 0.0 - 1.0 mg/dL    GFR Estimate >90 >60 mL/min/1.73m2   CBC with platelets and differential    Collection Time: 07/15/21  8:27 AM   Result Value Ref Range    WBC Count 7.6 4.0 - 11.0 10e3/uL    RBC Count 3.59 (L) 3.80 - 5.20 10e6/uL    Hemoglobin 11.1 (L) 11.7 - 15.7 g/dL    Hematocrit 34.6 (L) 35.0 - 47.0 %    MCV 96 78 - 100 fL    MCH 30.9 26.5 - 33.0 pg    MCHC 32.1 31.5 - 36.5 g/dL    RDW 16.9 (H) 10.0 - 15.0 %    Platelet Count 189 150 - 450 10e3/uL    % Neutrophils 75 %    % Lymphocytes 20 %    % Monocytes 5 %    % Eosinophils 0 %    % Basophils 0 %    % Immature Granulocytes 0 %    NRBCs per 100 WBC 0 <1 /100    Absolute Neutrophils 5.6 1.6 - 8.3 10e3/uL    Absolute Lymphocytes 1.6 0.8 - 5.3 10e3/uL    Absolute Monocytes 0.4 0.0 - 1.3 10e3/uL    Absolute Eosinophils 0.0 0.0 - 0.7 10e3/uL    Absolute Basophils 0.0 0.0 - 0.2 10e3/uL    Absolute Immature Granulocytes 0.0 <=0.0 10e3/uL    Absolute NRBCs 0.0 10e3/uL

## 2021-07-15 NOTE — LETTER
7/15/2021         RE: Joanne Colon  [address] [place] [address]        Dear Colleague,    Thank you for referring your patient, Joanne Colon, to the [facility] [place]. Please see a copy of my visit note below.    [facility] Hematology and Oncology Progress Note    Patient: Joanne Colon  MRN: [id number]  Date of Service: Jul 15, 2021         Reason for Visit:     D1C4 [facility] chemotherapy     Assessment and Plan:    1. cT2 N1 M0 invasive ductal carcinoma (R) breast, grade 3, ER positive, OR positive, HER-2 positive.    36 year old non-English speaking woman.    07/15/21:    Ms. Joanne Colon presented today accompanied by an Polish .  She looks and feels quite good today.  However, she continues to note diarrhea for about 5 days after each chemotherapy session - managed with OTC Imodium once daily.  She also continues to note nausea and vomiting starting about 2 days after each dose of chemotherapy and lasting for ~5 days.  She continues to awaken with heartburn each morning - she has not been taking the omeprazole 20 mg daily.  Weight stable.  No fever.  No mouth sores.  breast mass no longer palpable.  She denies cough, fever, chills, unusual headaches, visual or mentation disturbance, bladder issues, rash, neuropathies.    CBC - WBC, differential and Plts WNL.  HgB quite stable @ 11.1.    CMP - basically WNL.    Nausea/vomiting:    Compazine apparently not very effective.    Rx for Zofran 4 mg every 8 hours Eprescribed to her pharmacy.    Instructed on scheduling/using preventatively for the 1st few days after each cycle chemotherapy.    She is already receiving Emend as premed.    She may use Compazine in between the Zofran if needed.    GERD:    Instructed to manage morning GERD with taking OTC H2 blocker at bedtime for prophylaxis as long as she is receiving chemotherapy.      Other than some manageable, quite usual side effects of current therapy, she is  tolerating neoadjuvant chemotherapy acceptably well with a good clinical response.  Hematologies and metabolic panel acceptable.       Proceed with C4 TCHP neoadjuvant chemotherapy.    Anticipating 6 cycles TCHP chemotherapy, she is now accepting of 6 versus 4 cycles.    Instructed that she needs to contact us with a fever >100.4F as antibiotics or even hospitalization may be indicated..      08/05/21 - f/u C5 neoadjuvant TCHP chemotherapy.    Oncologic History:    1. cT2 N1 M0 invasive ductal carcinoma (R) breast, grade 3, ER positive, MO positive, HER-2 positive.    2021, April - presented to the ED with a 3-month history of a painful mass in her right breast.  No nipple changes or discharge.      04/13/21 diagnostic mammogram and ultrasound - confirmed a 2.2 x 2.8 x 1.4 cm right breast mass in the 8 o'clock position, 5 cm from the nipple with abnormal appearing right axillary lymph node of 1.7 cm with cortical thickness.      04/13/21 US-guided core needle biopsy - confirmed invasive ductal carcinoma, grade 3, ER moderate to strong positive, MO strongly positive, HER-2 positive by IHC.    Consult with Dr. Perdomo, surgeon - recommended neoadjuvant chemotherapy.    05/13/2021 - D1C1 neoadjuvant TCHP chemotherapy.      Required hospitalization with neutropenic fever, neutropenic colitis after cycle #1.      Neulasta added starting cycle #2 chemotherapy.     ECOG Performance - 0-1    Pain:    Pain Score: No Pain (0)    Encounter Diagnoses:    Problem List Items Addressed This Visit        Other    Malignant neoplasm of nipple of right breast in female, estrogen receptor positive (H) - Primary    Relevant Orders    CBC with platelets differential    Comprehensive metabolic panel    Encounter for antineoplastic chemotherapy    Relevant Orders    CBC with platelets differential    Comprehensive metabolic panel             Total Time 38 minutes    Td Padilla CNP     CC: Ye Lira MD    ______________________________________________________________________________    Review of systems:    No fever or night sweats.  No loss of weight.  No lumps or bumps anywhere.  No unusual headaches or eyesight issues.  No dizziness.  No bleeding from the nose.  No sores in the mouth. No problems with swallowing.  No chest pain. No shortness of breath. No cough.  No abdominal pain. No nausea or vomiting.  No diarrhea or constipation.  No blood in stool or black colored stools.  No problems passing urine.  No numbness or tingling in hands or feet.  No skin rashes.    A 14 point review of systems is otherwise negative.    Past History:    Past Medical History:   Diagnosis Date     Breast cancer (H)        Past Surgical History:   Procedure Laterality Date      SECTION      x two     IR CHEST PORT PLACEMENT > 5 YRS OF AGE  2021     IR PORT PLACEMENT >5 YEARS  2021     US BIOPSY FINE NEEDLE ASPIRATION LYMPH NODE (BREAST) RIGHT Right 2021     US BREAST CORE BIOPSY RIGHT Right 2021     Physical Exam:    /65 (BP Location: Right arm)   Pulse 98   Temp 97.9  F (36.6  C)   Resp 16   Wt 55.8 kg (123 lb 1.6 oz)   SpO2 99%   BMI 24.04 kg/m        GENERAL:  Alert and oriented.  Seated comfortably.  No distress.    HEENT:  Atraumatic and normocephalic.  LISSETH.  EOMI.  No pallor.  No icterus.  No mucosal lesions.    LYMPH NODES:  No palpable, cervical, axillary or inguinal lymphadenopathy.    CHEST:   Lungs clear to auscultation bilaterally.    CVS:    S1 and S2 heard.  Regular rate and rhythm.  No murmur, gallop or rub heard.  No peripheral edema.    ABDOMEN:   Soft. Not tender. Not distended. No palpable hepatomegaly or splenomegaly, masses or ascites.  Bowel sounds heard.    EXTREMITIES:  Warm.    SKIN:    No rash, bruising or purpura noted.    Lab Results:     Reviewed with patient.    Recent Results (from the past 168 hour(s))   Comprehensive metabolic panel   Result Value Ref Range     Sodium 138 136 - 145 mmol/L    Potassium 3.9 3.5 - 5.0 mmol/L    Chloride 108 (H) 98 - 107 mmol/L    Carbon Dioxide (CO2) 23 22 - 31 mmol/L    Anion Gap 7 5 - 18 mmol/L    Urea Nitrogen 11 8 - 22 mg/dL    Creatinine 0.61 0.60 - 1.10 mg/dL    Calcium 9.6 8.5 - 10.5 mg/dL    Glucose 126 (H) 70 - 125 mg/dL    Alkaline Phosphatase 68 45 - 120 U/L    AST 16 0 - 40 U/L    ALT 17 0 - 45 U/L    Protein Total 7.2 6.0 - 8.0 g/dL    Albumin 4.1 3.5 - 5.0 g/dL    Bilirubin Total 0.5 0.0 - 1.0 mg/dL    GFR Estimate >90 >60 mL/min/1.73m2   CBC with platelets and differential   Result Value Ref Range    WBC Count 7.6 4.0 - 11.0 10e3/uL    RBC Count 3.59 (L) 3.80 - 5.20 10e6/uL    Hemoglobin 11.1 (L) 11.7 - 15.7 g/dL    Hematocrit 34.6 (L) 35.0 - 47.0 %    MCV 96 78 - 100 fL    MCH 30.9 26.5 - 33.0 pg    MCHC 32.1 31.5 - 36.5 g/dL    RDW 16.9 (H) 10.0 - 15.0 %    Platelet Count 189 150 - 450 10e3/uL    % Neutrophils 75 %    % Lymphocytes 20 %    % Monocytes 5 %    % Eosinophils 0 %    % Basophils 0 %    % Immature Granulocytes 0 %    NRBCs per 100 WBC 0 <1 /100    Absolute Neutrophils 5.6 1.6 - 8.3 10e3/uL    Absolute Lymphocytes 1.6 0.8 - 5.3 10e3/uL    Absolute Monocytes 0.4 0.0 - 1.3 10e3/uL    Absolute Eosinophils 0.0 0.0 - 0.7 10e3/uL    Absolute Basophils 0.0 0.0 - 0.2 10e3/uL    Absolute Immature Granulocytes 0.0 <=0.0 10e3/uL    Absolute NRBCs 0.0 10e3/uL       Imaging:    No new imaging.      Oncology Rooming Note    July 15, 2021 8:36 AM   Joanne Colon is a 36 year old female who presents for:    Chief Complaint   Patient presents with     Cancer     Initial Vitals: /65 (BP Location: Right arm)   Pulse 98   Temp 97.9  F (36.6  C)   Resp 16   Wt 55.8 kg (123 lb 1.6 oz)   SpO2 99%   BMI 24.04 kg/m   Estimated body mass index is 24.04 kg/m  as calculated from the following:    Height as of 6/8/21: 1.524 m (5').    Weight as of this encounter: 55.8 kg (123 lb 1.6 oz). Body surface area is 1.54 meters  squared.  No Pain (0) Comment: Data Unavailable   No LMP recorded.  Allergies reviewed: Yes  Medications reviewed: Yes    Medications: MEDICATION REFILLS NEEDED TODAY. Provider was notified. dexamethasone  Pharmacy name entered into [employer]: [facility] #[id number] [place], [place] - [address]  AT [facility]    Clinical concerns: *** Pt wants to try a different medication for nausea , she still vomits after chemo was notified.She is taking only the dexamethasone.Feeling unsettled.      Celia Contreras, JAMEY                Again, thank you for allowing me to participate in the care of your patient.        Sincerely,        Td Padilla, CNP

## 2021-07-15 NOTE — PROGRESS NOTES
Oncology Rooming Note    July 15, 2021 8:36 AM   Joanne Colon is a 36 year old female who presents for:    Chief Complaint   Patient presents with     Cancer     Initial Vitals: /65 (BP Location: Right arm)   Pulse 98   Temp 97.9  F (36.6  C)   Resp 16   Wt 55.8 kg (123 lb 1.6 oz)   SpO2 99%   BMI 24.04 kg/m   Estimated body mass index is 24.04 kg/m  as calculated from the following:    Height as of 6/8/21: 1.524 m (5').    Weight as of this encounter: 55.8 kg (123 lb 1.6 oz). Body surface area is 1.54 meters squared.  No Pain (0) Comment: Data Unavailable   No LMP recorded.  Allergies reviewed: Yes  Medications reviewed: Yes    Medications: MEDICATION REFILLS NEEDED TODAY. Provider was notified. dexamethasone  Pharmacy name entered into [facility]: [id number] [place] [id number] AT [facility]    Clinical concerns:  Pt wants to try a different medication for nausea , she still vomits after chemo was notified.She is taking only the dexamethasone.Feeling unsettled.      Celia Contreras RN

## 2021-07-16 ENCOUNTER — INFUSION THERAPY VISIT (OUTPATIENT)
Dept: INFUSION THERAPY | Facility: CLINIC | Age: 36
End: 2021-07-16
Payer: MEDICAID

## 2021-07-16 DIAGNOSIS — Z17.0 MALIGNANT NEOPLASM OF NIPPLE OF RIGHT BREAST IN FEMALE, ESTROGEN RECEPTOR POSITIVE (H): Primary | ICD-10-CM

## 2021-07-16 DIAGNOSIS — Z51.11 ENCOUNTER FOR ANTINEOPLASTIC CHEMOTHERAPY: ICD-10-CM

## 2021-07-16 DIAGNOSIS — C50.011 MALIGNANT NEOPLASM OF NIPPLE OF RIGHT BREAST IN FEMALE, ESTROGEN RECEPTOR POSITIVE (H): Primary | ICD-10-CM

## 2021-07-16 PROCEDURE — 250N000011 HC RX IP 250 OP 636: Performed by: INTERNAL MEDICINE

## 2021-07-16 PROCEDURE — 96372 THER/PROPH/DIAG INJ SC/IM: CPT | Performed by: INTERNAL MEDICINE

## 2021-07-16 RX ADMIN — PEGFILGRASTIM-CBQV 6 MG: 6 INJECTION, SOLUTION SUBCUTANEOUS at 15:00

## 2021-07-18 PROBLEM — K59.1 FUNCTIONAL DIARRHEA: Status: ACTIVE | Noted: 2021-07-18

## 2021-07-18 PROBLEM — R11.2 CHEMOTHERAPY INDUCED NAUSEA AND VOMITING: Status: ACTIVE | Noted: 2021-07-18

## 2021-07-18 PROBLEM — T45.1X5A CHEMOTHERAPY INDUCED NAUSEA AND VOMITING: Status: ACTIVE | Noted: 2021-07-18

## 2021-07-22 NOTE — PROGRESS NOTES
Oncology Rooming Note    06/24/21 9:18 AM    Joanne Colon is a 36 y.o. female who presents for:    Chief Complaint   Patient presents with     HE Cancer     Breast Cancer       Initial Vitals: /62   Pulse (!) 102   Temp 97  F (36.1  C) (Oral)   Wt 125 lb 14.4 oz (57.1 kg)   SpO2 97%   BMI 24.59 kg/m       Estimated body mass index is 24.59 kg/m  as calculated from the following:    Height as of 6/8/21: 5' (1.524 m).    Weight as of this encounter: 125 lb 14.4 oz (57.1 kg).     Body surface area is 1.55 meters squared.      Allergies reviewed: Yes  Medications reviewed: Yes    Refills needed: Yes    Pharmacy name entered into [employer]: [facility]      Clinical concerns: concerns - diarrhea lasts ~5 days after chemo.  loperamide (Imodium) instructions given. hemorrhoids-painful. intermittent bleeding from L nare a couple of days after chemo. Able to control bleeding. LMP 5/10-5/11/21.       Akila Klein RN

## 2021-07-22 NOTE — PROGRESS NOTES
[facility] Hematology and Oncology Progress Note    Patient: Joanne Colon  MRN: [id number]  Date of Service: 06/24/2021        Reason for Visit    Chief Complaint   Patient presents with     HE Cancer     Breast Cancer       Assessment and Plan  Cancer Staging  Malignant neoplasm of nipple of right breast in female, estrogen receptor positive (H)  Staging form: Breast, AJCC 8th Edition  - Clinical stage from 4/21/2021: Stage IB (cT2, cN1(f), cM0, G3, ER+, OH+, HER2+) - Signed by Chelly Sanchez MD on 4/24/2021      ECOG Performance    1    Distress Assessment  Distress Assessment Score: No distress    Pain  Currently in Pain: No/denies      #. cT2 N1 M0 invasive ductal carcinoma, grade 3, ER positive, OH positive, HER-2 positive.   She continues to have good clinical response to neoadjuvant chemotherapy although there is still palpable mass about 3 cm in the right breast.  I reviewed her labs including hemogram, complete metabolic profile, magnesium and they all are adequate.  She tolerated second cycle of chemotherapy fairly well except her concern of dehydration and going into emergency room for evaluation.  No concerning electrolyte abnormalities during the ER visit either.     Recommended to continue cycle #3 chemotherapy at current dose with Neulasta support.     Today I spent a good amount of time going over the overall treatment plan.  I explained to her that we do not necessarily need to do assessment after 4 cycles of neoadjuvant chemotherapy unless there is clinical concern of cancer progression or she is not adequate to continue neoadjuvant chemotherapy.  In that case, she will proceed with surgery.  I will ask further why she would want to have assessment after 4 cycles of chemotherapy and she told me that she discussed her case [place] doctor from [place] and she was recommended to obtain mammogram and ultrasound after 4 cycles.  I told her that intended standard treatment is 6 cycles of  neoadjuvant chemotherapy and it is very important to understand the response of neoadjuvant chemotherapy after adequate chemotherapy administration.  Routine assessment after 4 cycles of neoadjuvant chemotherapy without any clinical indication is not a standard of care here in the [place].  I would not recommend that.  The proper pathology response assessment can occur only after standard neoadjuvant chemotherapy which will be very critical for determination of adjuvant chemotherapy.  Therefore I would not agree or recommend interval assessment without any clinical indication.   Return to clinic in 3 weeks prior to cycle #3 of chemotherapy.     I advised her to push oral fluids and good nutrition after chemotherapy.  I strongly encourage her that oral administration is more physiologic and efficient than IV fluids.    #.  2 subcentimeter liver lesions, most likely atypical hemangiomas.   Reviewed the MRI report and images.  It appears atypical hemangioma rather than metastasis.  We will consider follow-up in about 6 months per radiology recommendation.    #.  Neutropenic colitis no signs of recurrence.     #.  Heartburn.   I advised her to start omeprazole 20 mg daily for about 7-10 days after chemotherapy.    Problem List    1. Malignant neoplasm of nipple of right breast in female, estrogen receptor positive (H)  CC OFFICE VISIT LONG    lidocaine-prilocaine (EMLA) cream    Infusion Appointment    CC OFFICE VISIT LONG    DISCONTINUED: sodium chloride 0.9% 250 mL infusion    DISCONTINUED: palonosetron injection 0.25 mg (ALOXI)    DISCONTINUED: fosaprepitant 150 mg, dexAMETHasone 12 mg in sodium chloride 0.9% 150 mL    DISCONTINUED: pertuzumab 420 mg in sodium chloride 0.9% 250 mL (PERJETA)    DISCONTINUED: trastuzumab-anns 350 mg in sodium chloride 0.9% 250 mL (KANJINTI)    DISCONTINUED: DOCEtaxeL 120 mg in NaCl 0.9% (non-PVC) 250 mL (TAXOTERE)    DISCONTINUED: CARBOplatin 650 mg in dextrose 5% 250 mL (PARAPLATIN)     DISCONTINUED: pegfilgrastim injection 6 mg (NEULASTA DELIVERY KIT)    DISCONTINUED: sodium chloride flush 20 mL (NS)    DISCONTINUED: heparin lockflush (PF) porcine 300-600 Units    DISCONTINUED: diphenhydrAMINE injection 50 mg (BENADRYL)    DISCONTINUED: famotidine 20 mg/2 mL injection 20 mg (PEPCID)    DISCONTINUED: hydrocortisone sod succ (PF) injection 100 mg    DISCONTINUED: acetaminophen tablet 1,000 mg (TYLENOL)    DISCONTINUED: sodium chloride 0.9% 500 mL   2. Encounter for antineoplastic chemotherapy  CC OFFICE VISIT LONG    lidocaine-prilocaine (EMLA) cream    Infusion Appointment    CC OFFICE VISIT LONG    DISCONTINUED: sodium chloride 0.9% 250 mL infusion    DISCONTINUED: palonosetron injection 0.25 mg (ALOXI)    DISCONTINUED: fosaprepitant 150 mg, dexAMETHasone 12 mg in sodium chloride 0.9% 150 mL    DISCONTINUED: pertuzumab 420 mg in sodium chloride 0.9% 250 mL (PERJETA)    DISCONTINUED: trastuzumab-anns 350 mg in sodium chloride 0.9% 250 mL (KANJINTI)    DISCONTINUED: DOCEtaxeL 120 mg in NaCl 0.9% (non-PVC) 250 mL (TAXOTERE)    DISCONTINUED: CARBOplatin 650 mg in dextrose 5% 250 mL (PARAPLATIN)    DISCONTINUED: pegfilgrastim injection 6 mg (NEULASTA DELIVERY KIT)    DISCONTINUED: sodium chloride flush 20 mL (NS)    DISCONTINUED: heparin lockflush (PF) porcine 300-600 Units    DISCONTINUED: diphenhydrAMINE injection 50 mg (BENADRYL)    DISCONTINUED: famotidine 20 mg/2 mL injection 20 mg (PEPCID)    DISCONTINUED: hydrocortisone sod succ (PF) injection 100 mg    DISCONTINUED: acetaminophen tablet 1,000 mg (TYLENOL)    DISCONTINUED: sodium chloride 0.9% 500 mL   3. Low magnesium level  Magnesium   4. Diarrhea, unspecified type          CC: Ye Lira MD    ______________________________________________________________________________  Diagnosis  4/2021-presented to the emergency room with a mass in her right breast along with pain for about 3 months or so.  Denies nipple changes or discharge.   Underwent diagnostic mammogram and ultrasound on 2021 and it confirmed right breast mass in the 8 o'clock position, 5 cm from the nipple of 2.2 x 2.8 x 1.4 cm with abnormal appearing right axillary lymph node of 1.7 cm with cortical thickness.  She underwent ultrasound-guided core needle biopsy on the same day and it showed invasive ductal carcinoma, grade 3, ER moderate to strong positive, OH strongly positive, HER-2 positive by IHC.   - She met with Dr. Perdomo from surgery and recommended to consider neoadjuvant chemotherapy to HER-2 positive lymph node positive disease.    LMP- 5/10/2021    Treatment to date  2021-initiated neoadjuvant TCHP.  Required hospitalization with neutropenic fever, neutropenic colitis after cycle #1.  Neulasta added starting cycle #2 chemotherapy.    History of Present Illness    Ms. Joanne Colon presented today accompanied by her friend, Carey. She speaks Croatian. She declined  and she requests her friend to interpret for her.     She went to the emergency room 5 days after cycle #2 chemotherapy due to diarrhea and concern of colitis.  Electrolytes were unremarkable.  She was quite well and discharged from the emergency room.  No fever or infection symptoms.  She noted her breasts tumor has decreased in size.   She asked me to get mammogram and ultrasound to assess treatment response after 4 cycles of chemotherapy.    Pain Status  Currently in Pain: No/denies    Review of Systems    Apart from describing inHPI, the remainder of comprehensive ROS was negative.    Past History  Past Medical History:   Diagnosis Date     Breast cancer (H)          Past Surgical History:   Procedure Laterality Date      SECTION      x two     IR PORT PLACEMENT >5 YEARS  2021     US BIOPSY FINE NEEDLE ASPIRATION LYMPH NODE (BREAST) RIGHT Right 2021     US BREAST CORE BIOPSY RIGHT Right 2021       Physical Exam    Recent Vitals 2021   Height -   Weight 125 lbs 14  oz   BSA (m2) 1.55 m2   /62   Pulse 102   Temp 97   Temp src 1   SpO2 97   Some recent data might be hidden       General: alert, awake, not in acute distress  HEENT: Head: Normal, normocephalic, atraumatic.  Eye: Normal external eye, conjunctiva, lids cornea, LISSETH.  Nose: Normal external nose, mucus membranes and septum.  Pharynx: Normal buccal mucosa. Normal pharynx.  Neck / Thyroid: Supple, no masses, nodes, nodules or enlargement.  Lymphatics: No abnormally enlarged lymph nodes.  Chest: Normal chest wall and respirations. Clear to auscultation.  Breasts: significant improvement of the right breast mass from upper outer quadrant.  Heart: S1 S2 RRR, no murmur.   Abdomen: abdomen is soft without significant tenderness, masses, organomegaly or guarding  Extremities: normal strength, tone, and muscle mass  Skin: normal. no rash or abnormalities  CNS: non focal.      Lab Results    Recent Results (from the past 168 hour(s))   Comprehensive Metabolic Panel   Result Value Ref Range    Sodium 140 136 - 145 mmol/L    Potassium 4.0 3.5 - 5.0 mmol/L    Chloride 107 98 - 107 mmol/L    CO2 24 22 - 31 mmol/L    Anion Gap, Calculation 9 5 - 18 mmol/L    Glucose 125 70 - 125 mg/dL    BUN 10 8 - 22 mg/dL    Creatinine 0.66 0.60 - 1.10 mg/dL    GFR MDRD Af Amer >60 >60 mL/min/1.73m2    GFR MDRD Non Af Amer >60 >60 mL/min/1.73m2    Bilirubin, Total 0.5 0.0 - 1.0 mg/dL    Calcium 9.1 8.5 - 10.5 mg/dL    Protein, Total 6.6 6.0 - 8.0 g/dL    Albumin 3.5 3.5 - 5.0 g/dL    Alkaline Phosphatase 72 45 - 120 U/L    AST 22 0 - 40 U/L    ALT 23 0 - 45 U/L   HM1 (CBC with Diff)   Result Value Ref Range    WBC 4.0 4.0 - 11.0 thou/uL    RBC 3.80 3.80 - 5.40 mill/uL    Hemoglobin 11.3 (L) 12.0 - 16.0 g/dL    Hematocrit 35.3 35.0 - 47.0 %    MCV 93 80 - 100 fL    MCH 29.7 27.0 - 34.0 pg    MCHC 32.0 32.0 - 36.0 g/dL    RDW 16.7 (H) 11.0 - 14.5 %    Platelets 201 140 - 440 thou/uL    MPV 9.6 8.5 - 12.5 fL    Neutrophils % 76 (H) 50 - 70 %     Lymphocytes % 20 20 - 40 %    Monocytes % 4 2 - 10 %    Eosinophils % 1 0 - 6 %    Basophils % 0 0 - 2 %    Immature Granulocyte % 0 <=0 %    Neutrophils Absolute 3.1 2.0 - 7.7 thou/uL    Lymphocytes Absolute 0.8 0.8 - 4.4 thou/uL    Monocytes Absolute 0.1 0.0 - 0.9 thou/uL    Eosinophils Absolute 0.0 0.0 - 0.4 thou/uL    Basophils Absolute 0.0 0.0 - 0.2 thou/uL    Immature Granulocyte Absolute 0.0 <=0.0 thou/uL   Magnesium   Result Value Ref Range    Magnesium 1.8 1.8 - 2.6 mg/dL       Imaging    Mr Liver With Without Contrast    Result Date: 6/19/2021  EXAM: MR LIVER W WO CONTRAST LOCATION: [facility] DATE/TIME: 6/19/2021 7:14 AM INDICATION: liver lesions by CT scan COMPARISON: 5/23/21. TECHNIQUE: Routine MRI liver protocol including T1 in/out phase, diffusion, multiplane T2, and dynamic T1 with IV contrast.  CONTRAST: Gadavist 6 FINDINGS: LIVER: No steatosis. Normal size of the liver with a smooth liver contour. A 7 mm lesion in the posterior upper right hepatic lobe segment VII is hypointense on early postcontrast T1-weighted imaging, is hyperintense on 7 minute delayed postcontrast imaging, and is hyperintense on T2-weighted imaging. There is a 9 mm x 7 mm focus in the anterior upper right hepatic lobe segment VIII which exhibits similar imaging characteristics (series 27 image 19 and series 13 image 22). No other hepatic lesions identified. ADDITIONAL FINDINGS: A few T2 hyperintense breast lesions are present. No lymphadenopathy. No steatosis. Normal gallbladder. No bile duct dilatation. Normal spleen, pancreas, adrenal glands, and kidneys. Normal bowel in the field-of-view     1.  2 small liver lesions may represent atypical hemangiomas. Recommend follow-up MRI in 6 months to confirm stability. 2.  A few T2 hyperintense breast lesions are incompletely assessed on this study. Correlate with breast imaging.    TT: 45 minutes: time consisted of preparing to see the patient (eg. review of  tests)-10 minutes, obtaining an/or reviewing separately obtained history, explaining the rationale and overall treatment plan (30 minutes), ordering medication, test, communicating with other healthcare professionals,  documenting clinical information in the electronic health record (5 minutes).      Signed by: Chelly Sanchez MD

## 2021-08-05 ENCOUNTER — ONCOLOGY VISIT (OUTPATIENT)
Dept: ONCOLOGY | Facility: CLINIC | Age: 36
End: 2021-08-05
Attending: NURSE PRACTITIONER
Payer: COMMERCIAL

## 2021-08-05 ENCOUNTER — LAB (OUTPATIENT)
Dept: INFUSION THERAPY | Facility: CLINIC | Age: 36
End: 2021-08-05
Attending: INTERNAL MEDICINE
Payer: COMMERCIAL

## 2021-08-05 VITALS
BODY MASS INDEX: 24.32 KG/M2 | OXYGEN SATURATION: 98 % | SYSTOLIC BLOOD PRESSURE: 116 MMHG | RESPIRATION RATE: 16 BRPM | HEIGHT: 60 IN | DIASTOLIC BLOOD PRESSURE: 65 MMHG | HEART RATE: 94 BPM | WEIGHT: 123.9 LBS | TEMPERATURE: 98.3 F

## 2021-08-05 DIAGNOSIS — Z17.0 MALIGNANT NEOPLASM OF NIPPLE OF RIGHT BREAST IN FEMALE, ESTROGEN RECEPTOR POSITIVE (H): Primary | ICD-10-CM

## 2021-08-05 DIAGNOSIS — L65.8 ALOPECIA DUE TO CYTOTOXIC DRUG: ICD-10-CM

## 2021-08-05 DIAGNOSIS — Z51.11 ENCOUNTER FOR ANTINEOPLASTIC CHEMOTHERAPY: ICD-10-CM

## 2021-08-05 DIAGNOSIS — Z17.0 MALIGNANT NEOPLASM OF NIPPLE OF RIGHT BREAST IN FEMALE, ESTROGEN RECEPTOR POSITIVE (H): ICD-10-CM

## 2021-08-05 DIAGNOSIS — T45.1X5A ALOPECIA DUE TO CYTOTOXIC DRUG: ICD-10-CM

## 2021-08-05 DIAGNOSIS — C50.011 MALIGNANT NEOPLASM OF NIPPLE OF RIGHT BREAST IN FEMALE, ESTROGEN RECEPTOR POSITIVE (H): Primary | ICD-10-CM

## 2021-08-05 DIAGNOSIS — K59.1 FUNCTIONAL DIARRHEA: ICD-10-CM

## 2021-08-05 DIAGNOSIS — C50.011 MALIGNANT NEOPLASM OF NIPPLE OF RIGHT BREAST IN FEMALE, ESTROGEN RECEPTOR POSITIVE (H): ICD-10-CM

## 2021-08-05 DIAGNOSIS — R11.2 CHEMOTHERAPY INDUCED NAUSEA AND VOMITING: ICD-10-CM

## 2021-08-05 DIAGNOSIS — T45.1X5A CHEMOTHERAPY INDUCED NAUSEA AND VOMITING: ICD-10-CM

## 2021-08-05 DIAGNOSIS — Z51.11 ENCOUNTER FOR ANTINEOPLASTIC CHEMOTHERAPY: Primary | ICD-10-CM

## 2021-08-05 LAB
ALBUMIN SERPL-MCNC: 4 G/DL (ref 3.5–5)
ALP SERPL-CCNC: 75 U/L (ref 45–120)
ALT SERPL W P-5'-P-CCNC: 16 U/L (ref 0–45)
ANION GAP SERPL CALCULATED.3IONS-SCNC: 8 MMOL/L (ref 5–18)
AST SERPL W P-5'-P-CCNC: 14 U/L (ref 0–40)
BASOPHILS # BLD AUTO: 0 10E3/UL (ref 0–0.2)
BASOPHILS NFR BLD AUTO: 0 %
BILIRUB SERPL-MCNC: 0.4 MG/DL (ref 0–1)
BUN SERPL-MCNC: 9 MG/DL (ref 8–22)
CALCIUM SERPL-MCNC: 9.6 MG/DL (ref 8.5–10.5)
CHLORIDE BLD-SCNC: 106 MMOL/L (ref 98–107)
CO2 SERPL-SCNC: 24 MMOL/L (ref 22–31)
CREAT SERPL-MCNC: 0.61 MG/DL (ref 0.6–1.1)
EOSINOPHIL # BLD AUTO: 0 10E3/UL (ref 0–0.7)
EOSINOPHIL NFR BLD AUTO: 0 %
ERYTHROCYTE [DISTWIDTH] IN BLOOD BY AUTOMATED COUNT: 15.9 % (ref 10–15)
GFR SERPL CREATININE-BSD FRML MDRD: >90 ML/MIN/1.73M2
GLUCOSE BLD-MCNC: 128 MG/DL (ref 70–125)
HCT VFR BLD AUTO: 35.3 % (ref 35–47)
HGB BLD-MCNC: 11.4 G/DL (ref 11.7–15.7)
IMM GRANULOCYTES # BLD: 0 10E3/UL
IMM GRANULOCYTES NFR BLD: 1 %
LYMPHOCYTES # BLD AUTO: 1.2 10E3/UL (ref 0.8–5.3)
LYMPHOCYTES NFR BLD AUTO: 19 %
MCH RBC QN AUTO: 31.2 PG (ref 26.5–33)
MCHC RBC AUTO-ENTMCNC: 32.3 G/DL (ref 31.5–36.5)
MCV RBC AUTO: 97 FL (ref 78–100)
MONOCYTES # BLD AUTO: 0.1 10E3/UL (ref 0–1.3)
MONOCYTES NFR BLD AUTO: 2 %
NEUTROPHILS # BLD AUTO: 5.2 10E3/UL (ref 1.6–8.3)
NEUTROPHILS NFR BLD AUTO: 78 %
NRBC # BLD AUTO: 0 10E3/UL
NRBC BLD AUTO-RTO: 0 /100
PLATELET # BLD AUTO: 191 10E3/UL (ref 150–450)
POTASSIUM BLD-SCNC: 4 MMOL/L (ref 3.5–5)
PROT SERPL-MCNC: 7 G/DL (ref 6–8)
RBC # BLD AUTO: 3.65 10E6/UL (ref 3.8–5.2)
SODIUM SERPL-SCNC: 138 MMOL/L (ref 136–145)
WBC # BLD AUTO: 6.5 10E3/UL (ref 4–11)

## 2021-08-05 PROCEDURE — 80053 COMPREHEN METABOLIC PANEL: CPT | Performed by: NURSE PRACTITIONER

## 2021-08-05 PROCEDURE — 258N000003 HC RX IP 258 OP 636: Performed by: NURSE PRACTITIONER

## 2021-08-05 PROCEDURE — 96375 TX/PRO/DX INJ NEW DRUG ADDON: CPT

## 2021-08-05 PROCEDURE — 96367 TX/PROPH/DG ADDL SEQ IV INF: CPT

## 2021-08-05 PROCEDURE — 99214 OFFICE O/P EST MOD 30 MIN: CPT | Performed by: NURSE PRACTITIONER

## 2021-08-05 PROCEDURE — 85025 COMPLETE CBC W/AUTO DIFF WBC: CPT | Performed by: NURSE PRACTITIONER

## 2021-08-05 PROCEDURE — 96417 CHEMO IV INFUS EACH ADDL SEQ: CPT

## 2021-08-05 PROCEDURE — 258N000003 HC RX IP 258 OP 636: Performed by: INTERNAL MEDICINE

## 2021-08-05 PROCEDURE — 250N000011 HC RX IP 250 OP 636: Performed by: INTERNAL MEDICINE

## 2021-08-05 PROCEDURE — 96413 CHEMO IV INFUSION 1 HR: CPT

## 2021-08-05 PROCEDURE — G0463 HOSPITAL OUTPT CLINIC VISIT: HCPCS | Mod: 25

## 2021-08-05 PROCEDURE — 250N000011 HC RX IP 250 OP 636: Performed by: NURSE PRACTITIONER

## 2021-08-05 RX ORDER — DIPHENHYDRAMINE HYDROCHLORIDE 50 MG/ML
50 INJECTION INTRAMUSCULAR; INTRAVENOUS
Status: DISCONTINUED | OUTPATIENT
Start: 2021-08-05 | End: 2021-08-05 | Stop reason: HOSPADM

## 2021-08-05 RX ORDER — LORAZEPAM 2 MG/ML
0.5 INJECTION INTRAMUSCULAR EVERY 4 HOURS PRN
Status: CANCELLED
Start: 2021-08-05

## 2021-08-05 RX ORDER — EPINEPHRINE 1 MG/ML
0.3 INJECTION, SOLUTION INTRAMUSCULAR; SUBCUTANEOUS EVERY 5 MIN PRN
Status: DISCONTINUED | OUTPATIENT
Start: 2021-08-05 | End: 2021-08-05 | Stop reason: HOSPADM

## 2021-08-05 RX ORDER — HEPARIN SODIUM (PORCINE) LOCK FLUSH IV SOLN 100 UNIT/ML 100 UNIT/ML
5 SOLUTION INTRAVENOUS
Status: CANCELLED | OUTPATIENT
Start: 2021-08-05

## 2021-08-05 RX ORDER — METHYLPREDNISOLONE SODIUM SUCCINATE 125 MG/2ML
125 INJECTION, POWDER, LYOPHILIZED, FOR SOLUTION INTRAMUSCULAR; INTRAVENOUS
Status: CANCELLED
Start: 2021-08-05

## 2021-08-05 RX ORDER — ALBUTEROL SULFATE 90 UG/1
1-2 AEROSOL, METERED RESPIRATORY (INHALATION)
Status: CANCELLED
Start: 2021-08-05

## 2021-08-05 RX ORDER — PROCHLORPERAZINE MALEATE 10 MG
10 TABLET ORAL
COMMUNITY
Start: 2021-04-22 | End: 2021-11-11

## 2021-08-05 RX ORDER — PALONOSETRON 0.05 MG/ML
0.25 INJECTION, SOLUTION INTRAVENOUS ONCE
Status: COMPLETED | OUTPATIENT
Start: 2021-08-05 | End: 2021-08-05

## 2021-08-05 RX ORDER — ALBUTEROL SULFATE 90 UG/1
1-2 AEROSOL, METERED RESPIRATORY (INHALATION)
Status: DISCONTINUED | OUTPATIENT
Start: 2021-08-05 | End: 2021-08-05 | Stop reason: HOSPADM

## 2021-08-05 RX ORDER — METHYLPREDNISOLONE SODIUM SUCCINATE 125 MG/2ML
125 INJECTION, POWDER, LYOPHILIZED, FOR SOLUTION INTRAMUSCULAR; INTRAVENOUS
Status: DISCONTINUED | OUTPATIENT
Start: 2021-08-05 | End: 2021-08-05 | Stop reason: HOSPADM

## 2021-08-05 RX ORDER — MEPERIDINE HYDROCHLORIDE 50 MG/ML
25 INJECTION INTRAMUSCULAR; INTRAVENOUS; SUBCUTANEOUS EVERY 30 MIN PRN
Status: DISCONTINUED | OUTPATIENT
Start: 2021-08-05 | End: 2021-08-05 | Stop reason: HOSPADM

## 2021-08-05 RX ORDER — PALONOSETRON 0.05 MG/ML
0.25 INJECTION, SOLUTION INTRAVENOUS ONCE
Status: CANCELLED
Start: 2021-08-05

## 2021-08-05 RX ORDER — ALBUTEROL SULFATE 0.83 MG/ML
2.5 SOLUTION RESPIRATORY (INHALATION)
Status: CANCELLED | OUTPATIENT
Start: 2021-08-05

## 2021-08-05 RX ORDER — DIPHENHYDRAMINE HYDROCHLORIDE 50 MG/ML
50 INJECTION INTRAMUSCULAR; INTRAVENOUS
Status: CANCELLED
Start: 2021-08-05

## 2021-08-05 RX ORDER — DIPHENHYDRAMINE HCL 50 MG
50 CAPSULE ORAL
Status: CANCELLED | OUTPATIENT
Start: 2021-08-05

## 2021-08-05 RX ORDER — NALOXONE HYDROCHLORIDE 0.4 MG/ML
0.2 INJECTION, SOLUTION INTRAMUSCULAR; INTRAVENOUS; SUBCUTANEOUS
Status: DISCONTINUED | OUTPATIENT
Start: 2021-08-05 | End: 2021-08-05 | Stop reason: HOSPADM

## 2021-08-05 RX ORDER — HEPARIN SODIUM,PORCINE 10 UNIT/ML
5 VIAL (ML) INTRAVENOUS
Status: CANCELLED | OUTPATIENT
Start: 2021-08-05

## 2021-08-05 RX ORDER — MEPERIDINE HYDROCHLORIDE 50 MG/ML
25 INJECTION INTRAMUSCULAR; INTRAVENOUS; SUBCUTANEOUS EVERY 30 MIN PRN
Status: CANCELLED | OUTPATIENT
Start: 2021-08-05

## 2021-08-05 RX ORDER — NALOXONE HYDROCHLORIDE 0.4 MG/ML
0.2 INJECTION, SOLUTION INTRAMUSCULAR; INTRAVENOUS; SUBCUTANEOUS
Status: CANCELLED | OUTPATIENT
Start: 2021-08-05

## 2021-08-05 RX ORDER — ACETAMINOPHEN 325 MG/1
650 TABLET ORAL
Status: CANCELLED
Start: 2021-08-05

## 2021-08-05 RX ORDER — HEPARIN SODIUM (PORCINE) LOCK FLUSH IV SOLN 100 UNIT/ML 100 UNIT/ML
5 SOLUTION INTRAVENOUS
Status: DISCONTINUED | OUTPATIENT
Start: 2021-08-05 | End: 2021-08-05 | Stop reason: HOSPADM

## 2021-08-05 RX ORDER — ALBUTEROL SULFATE 0.83 MG/ML
2.5 SOLUTION RESPIRATORY (INHALATION)
Status: DISCONTINUED | OUTPATIENT
Start: 2021-08-05 | End: 2021-08-05 | Stop reason: HOSPADM

## 2021-08-05 RX ORDER — EPINEPHRINE 1 MG/ML
0.3 INJECTION, SOLUTION INTRAMUSCULAR; SUBCUTANEOUS EVERY 5 MIN PRN
Status: CANCELLED | OUTPATIENT
Start: 2021-08-05

## 2021-08-05 RX ADMIN — PERTUZUMAB 420 MG: 30 INJECTION, SOLUTION, CONCENTRATE INTRAVENOUS at 10:32

## 2021-08-05 RX ADMIN — FOSAPREPITANT: 150 INJECTION, POWDER, LYOPHILIZED, FOR SOLUTION INTRAVENOUS at 09:59

## 2021-08-05 RX ADMIN — HEPARIN SODIUM (PORCINE) LOCK FLUSH IV SOLN 100 UNIT/ML 5 ML: 100 SOLUTION at 13:40

## 2021-08-05 RX ADMIN — PALONOSETRON 0.25 MG: 0.25 INJECTION, SOLUTION INTRAVENOUS at 09:55

## 2021-08-05 RX ADMIN — DOCETAXEL 118 MG: 20 INJECTION INTRAVENOUS at 11:56

## 2021-08-05 RX ADMIN — TRASTUZUMAB 348 MG: 150 INJECTION, POWDER, LYOPHILIZED, FOR SOLUTION INTRAVENOUS at 11:11

## 2021-08-05 RX ADMIN — CARBOPLATIN 615 MG: 10 INJECTION, SOLUTION INTRAVENOUS at 13:04

## 2021-08-05 RX ADMIN — SODIUM CHLORIDE 250 ML: 9 INJECTION, SOLUTION INTRAVENOUS at 09:54

## 2021-08-05 ASSESSMENT — PAIN SCALES - GENERAL: PAINLEVEL: NO PAIN (0)

## 2021-08-05 ASSESSMENT — MIFFLIN-ST. JEOR: SCORE: 1163.51

## 2021-08-05 NOTE — LETTER
8/5/2021         RE: Joanne Colon  [address] [place] [address]        Dear Colleague,    Thank you for referring your patient, Joanne Colon, to the [facility]. Please see a copy of my visit note below.    Oncology Rooming Note    August 5, 2021 8:48 AM   Joanne Colon is a 36 year old female who presents for:    Chief Complaint   Patient presents with     Oncology Clinic Visit     Initial Vitals: /65 (BP Location: Right arm, Cuff Size: Adult Regular)   Pulse 94   Temp 98.3  F (36.8  C) (Oral)   Resp 16   Ht 1.524 m (5')   Wt 56.2 kg (123 lb 14.4 oz)   SpO2 98%   BMI 24.20 kg/m   Estimated body mass index is 24.2 kg/m  as calculated from the following:    Height as of this encounter: 1.524 m (5').    Weight as of this encounter: 56.2 kg (123 lb 14.4 oz). Body surface area is 1.54 meters squared.  No Pain (0) Comment: Data Unavailable   No LMP recorded.  Allergies reviewed: Yes  Medications reviewed: Yes    Medications: MEDICATION REFILLS NEEDED TODAY. Provider was notified.  Pharmacy name entered into [facility]: [id number] - [place], [place] - [address] [place] [facility]    Clinical concerns: nausea/decreased appetite 6-7 days post tx, and acid reflux post tx.    Selene Hernandez                [facility] Hematology and Oncology Progress Note    Patient: Joanne Colon  MRN: [id number]  Date of Service: Aug 5, 2021         Reason for Visit:      D1C5 TCHP chemotherapy      Assessment and Plan:     1.           cT2 N1 M0 invasive ductal carcinoma (R) breast, grade 3, ER positive, ID positive, HER-2 positive.    36 year old     Ms. Joanne Colon presents with her sister, Shadi, and no Spanish .  She looks and feels quite good today.  She noted much less diarrhea with C4 - managed with OTC Imodium 2-tabs, once daily at most.  She noted much less nausea as well, only vomited once.  She does loose her appetite for ~6 days after  each chemotherapy cycle.  Her heartburn has been effectively managed with omeprazole 20 mg 1-2 x daily.  Her weight is stable with start of treatment weight.  No fever.  No mouth sores.  Breast mass no longer palpable.  She denies cough, fever, chills, unusual headaches, visual or mentation disturbance, bladder issues, rash, neuropathies.    CBC - WBC, differential and Plts WNL.  HgB quite stable @ 11.4.    CMP - basically WNL.    Nausea/vomiting - managed with Compazine as needed.      Also has Rx for Zofran 4 mg every 8 hours if needed.    She is already receiving Emend as premed.    GERD:    Resolved.    Now managed with omeprazole 20 mg 1-2 x daily.      Diarrhea:    Minimal    Noted for ~5 days after each chemotherapy session    Managed with minimal imodium (2-tabs with 1st episode diarrhea in a 24 hour period, followed by 1-tab with each subsequent episode diarrhea).    Other than some manageable, quite usual side effects of current therapy, she is tolerating neoadjuvant chemotherapy quite well with a good clinical response.  Hematologies and metabolic panel acceptable.      Proceed with C5 TCHP [AUC 5] neoadjuvant chemotherapy.    Anticipating 6 cycles TCHP chemotherapy, she is now accepting of 6 versus 4 cycles.    Instructed that she needs to contact us with a fever >100.4F as antibiotics or even hospitalization may be indicated..      Joanne has received both Pfizer [email or web address] vaccines.    08/26/21 - f/u C6, her final cycle neoadjuvant TCHP chemotherapy with labs per Treatment Plan..     Oncologic History:     1.           cT2 N1 M0 invasive ductal carcinoma (R) breast, grade 3, ER positive, NJ positive, HER-2 positive.    2021, April - presented to the ED with a 3-month history of a painful mass in her right breast.  No nipple changes or discharge.    ? 04/13/21 diagnostic mammogram and ultrasound - confirmed a 2.2 x 2.8 x 1.4 cm right breast mass in the 8 o'clock position, 5 cm from the nipple with  abnormal appearing right axillary lymph node of 1.7 cm with cortical thickness.    ? 04/13/21 US-guided core needle biopsy - confirmed invasive ductal carcinoma, grade 3, ER moderate to strong positive, SD strongly positive, HER-2 positive by IHC.  ? Consult with Dr. Perdomo, surgeon - recommended neoadjuvant chemotherapy.    05/13/2021 - D1C1 neoadjuvant TCHP [AUC 5] chemotherapy.    ? Required hospitalization with neutropenic fever, neutropenic colitis after cycle #1.    ? Neulasta added starting cycle #2 chemotherapy.    ECOG Performance:    0 - Independent    Pain:    Pain Score: No Pain (0)    Encounter Diagnoses:    Problem List Items Addressed This Visit        Digestive    Functional diarrhea    Chemotherapy induced nausea and vomiting       Other    Malignant neoplasm of nipple of right breast in female, estrogen receptor positive (H) - Primary    Relevant Orders    CBC with platelets differential (Completed)    Comprehensive metabolic panel (Completed)    Encounter for antineoplastic chemotherapy    Relevant Orders    CBC with platelets differential (Completed)    Comprehensive metabolic panel (Completed)      Other Visit Diagnoses     Alopecia due to cytotoxic drug                 Total time 39 minutes.    Td Padilla, CNP     CC: Ye Lira MD   ______________________________________________________________________________    Review of systems:    No fever or night sweats.  No loss of weight.  No lumps or bumps anywhere.  No unusual headaches or eyesight issues.  No dizziness.  No bleeding from the nose.  No sores in the mouth. No problems with swallowing.  No chest pain. No shortness of breath. No cough.  No abdominal pain. No nausea or vomiting.    Noting much less diarrhea.     No blood in stool or black colored stools.  No problems passing urine.  No numbness or tingling in hands or feet.  No skin rashes.    A 14 point review of systems is otherwise negative.    Past History:    Past Medical  History:   Diagnosis Date     Breast cancer (H)        Past Surgical History:   Procedure Laterality Date      SECTION      x two     IR CHEST PORT PLACEMENT > 5 YRS OF AGE  2021     IR PORT PLACEMENT >5 YEARS  2021     US BIOPSY FINE NEEDLE ASPIRATION LYMPH NODE (BREAST) RIGHT Right 2021     US BREAST CORE BIOPSY RIGHT Right 2021     Physical Exam:    /65 (BP Location: Right arm, Cuff Size: Adult Regular)   Pulse 94   Temp 98.3  F (36.8  C) (Oral)   Resp 16   Ht 1.524 m (5')   Wt 56.2 kg (123 lb 14.4 oz)   SpO2 98%   BMI 24.20 kg/m      GENERAL:       Alert and oriented.  Seated comfortably.  No acute distress.     HEENT:             Atraumatic and normocephalic.  LISSETH.  EOMI.  No pallor.  No icterus.  No mucosal lesions.     LYMPH NODES:  No palpable, cervical, axillary or inguinal lymphadenopathy.     CHEST:               Lungs clear to auscultation bilaterally.    BREAST:  (R) - no palpable lump or concerning skin/nipple changes.     CVS:                      S1 and S2 heard.  Regular rate and rhythm.  No murmur, gallop or rub heard.  No peripheral edema.     ABDOMEN:          Soft.  Not tender or distended. No palpable hepatomegaly or splenomegaly, masses or ascites.  Bowel sounds heard.     EXTREMITIES Warm.     SKIN:              No rash, bruising or purpura noted.  Partial alopecia.    Lab Results:    Reviewed with patient and her sister.    Recent Results (from the past 24 hour(s))   Comprehensive metabolic panel    Collection Time: 21  8:26 AM   Result Value Ref Range    Sodium 138 136 - 145 mmol/L    Potassium 4.0 3.5 - 5.0 mmol/L    Chloride 106 98 - 107 mmol/L    Carbon Dioxide (CO2) 24 22 - 31 mmol/L    Anion Gap 8 5 - 18 mmol/L    Urea Nitrogen 9 8 - 22 mg/dL    Creatinine 0.61 0.60 - 1.10 mg/dL    Calcium 9.6 8.5 - 10.5 mg/dL    Glucose 128 (H) 70 - 125 mg/dL    Alkaline Phosphatase 75 45 - 120 U/L    AST 14 0 - 40 U/L    ALT 16 0 - 45 U/L    Protein  Total 7.0 6.0 - 8.0 g/dL    Albumin 4.0 3.5 - 5.0 g/dL    Bilirubin Total 0.4 0.0 - 1.0 mg/dL    GFR Estimate >90 >60 mL/min/1.73m2   CBC with platelets and differential    Collection Time: 08/05/21  8:26 AM   Result Value Ref Range    WBC Count 6.5 4.0 - 11.0 10e3/uL    RBC Count 3.65 (L) 3.80 - 5.20 10e6/uL    Hemoglobin 11.4 (L) 11.7 - 15.7 g/dL    Hematocrit 35.3 35.0 - 47.0 %    MCV 97 78 - 100 fL    MCH 31.2 26.5 - 33.0 pg    MCHC 32.3 31.5 - 36.5 g/dL    RDW 15.9 (H) 10.0 - 15.0 %    Platelet Count 191 150 - 450 10e3/uL    % Neutrophils 78 %    % Lymphocytes 19 %    % Monocytes 2 %    % Eosinophils 0 %    % Basophils 0 %    % Immature Granulocytes 1 %    NRBCs per 100 WBC 0 <1 /100    Absolute Neutrophils 5.2 1.6 - 8.3 10e3/uL    Absolute Lymphocytes 1.2 0.8 - 5.3 10e3/uL    Absolute Monocytes 0.1 0.0 - 1.3 10e3/uL    Absolute Eosinophils 0.0 0.0 - 0.7 10e3/uL    Absolute Basophils 0.0 0.0 - 0.2 10e3/uL    Absolute Immature Granulocytes 0.0 <=0.0 10e3/uL    Absolute NRBCs 0.0 10e3/uL     Imaging:    No results found.          Again, thank you for allowing me to participate in the care of your patient.        Sincerely,        Td Padilla, CNP

## 2021-08-05 NOTE — PROGRESS NOTES
[facility] Hematology and Oncology Progress Note    Patient: Joanne Colon  MRN: [id number]  Date of Service: Aug 5, 2021         Reason for Visit:      D1C5 TCHP chemotherapy      Assessment and Plan:     1.           cT2 N1 M0 invasive ductal carcinoma (R) breast, grade 3, ER positive, IL positive, HER-2 positive.    36 year old     Ms. Joanne Colon presents with her sister, Shadi, and no Urdu .  She looks and feels quite good today.  She noted much less diarrhea with C4 - managed with OTC Imodium 2-tabs, once daily at most.  She noted much less nausea as well, only vomited once.  She does loose her appetite for ~6 days after each chemotherapy cycle.  Her heartburn has been effectively managed with omeprazole 20 mg 1-2 x daily.  Her weight is stable with start of treatment weight.  No fever.  No mouth sores.  Breast mass no longer palpable.  She denies cough, fever, chills, unusual headaches, visual or mentation disturbance, bladder issues, rash, neuropathies.    CBC - WBC, differential and Plts WNL.  HgB quite stable @ 11.4.    CMP - basically WNL.    Nausea/vomiting - managed with Compazine as needed.      Also has Rx for Zofran 4 mg every 8 hours if needed.    She is already receiving Emend as premed.    GERD:    Resolved.    Now managed with omeprazole 20 mg 1-2 x daily.      Diarrhea:    Minimal    Noted for ~5 days after each chemotherapy session    Managed with minimal imodium (2-tabs with 1st episode diarrhea in a 24 hour period, followed by 1-tab with each subsequent episode diarrhea).    Other than some manageable, quite usual side effects of current therapy, she is tolerating neoadjuvant chemotherapy quite well with a good clinical response.  Hematologies and metabolic panel acceptable.      Proceed with C5 TCHP [AUC 5] neoadjuvant chemotherapy.    Anticipating 6 cycles TCHP chemotherapy, she is now accepting of 6 versus 4 cycles.    Instructed that she needs to contact us with a fever  >100.4F as antibiotics or even hospitalization may be indicated..      Joanne has received both Pfizer SARScovid.19 vaccines.    08/26/21 - f/u C6, her final cycle neoadjuvant TCHP chemotherapy with labs per Treatment Plan..     Oncologic History:     1.           cT2 N1 M0 invasive ductal carcinoma (R) breast, grade 3, ER positive, LA positive, HER-2 positive.    2021, April - presented to the ED with a 3-month history of a painful mass in her right breast.  No nipple changes or discharge.    ? 04/13/21 diagnostic mammogram and ultrasound - confirmed a 2.2 x 2.8 x 1.4 cm right breast mass in the 8 o'clock position, 5 cm from the nipple with abnormal appearing right axillary lymph node of 1.7 cm with cortical thickness.    ? 04/13/21 US-guided core needle biopsy - confirmed invasive ductal carcinoma, grade 3, ER moderate to strong positive, LA strongly positive, HER-2 positive by IHC.  ? Consult with Dr. Perdomo, surgeon - recommended neoadjuvant chemotherapy.    05/13/2021 - D1C1 neoadjuvant TCHP [AUC 5] chemotherapy.    ? Required hospitalization with neutropenic fever, neutropenic colitis after cycle #1.    ? Neulasta added starting cycle #2 chemotherapy.    ECOG Performance:    0 - Independent    Pain:    Pain Score: No Pain (0)    Encounter Diagnoses:    Problem List Items Addressed This Visit        Digestive    Functional diarrhea    Chemotherapy induced nausea and vomiting       Other    Malignant neoplasm of nipple of right breast in female, estrogen receptor positive (H) - Primary    Relevant Orders    CBC with platelets differential (Completed)    Comprehensive metabolic panel (Completed)    Encounter for antineoplastic chemotherapy    Relevant Orders    CBC with platelets differential (Completed)    Comprehensive metabolic panel (Completed)      Other Visit Diagnoses     Alopecia due to cytotoxic drug                 Total time 39 minutes.    Td Padilla CNP     CC: Ye Lira MD    ______________________________________________________________________________    Review of systems:    No fever or night sweats.  No loss of weight.  No lumps or bumps anywhere.  No unusual headaches or eyesight issues.  No dizziness.  No bleeding from the nose.  No sores in the mouth. No problems with swallowing.  No chest pain. No shortness of breath. No cough.  No abdominal pain. No nausea or vomiting.    Noting much less diarrhea.     No blood in stool or black colored stools.  No problems passing urine.  No numbness or tingling in hands or feet.  No skin rashes.    A 14 point review of systems is otherwise negative.    Past History:    Past Medical History:   Diagnosis Date     Breast cancer (H)        Past Surgical History:   Procedure Laterality Date      SECTION      x two     IR CHEST PORT PLACEMENT > 5 YRS OF AGE  2021     IR PORT PLACEMENT >5 YEARS  2021     US BIOPSY FINE NEEDLE ASPIRATION LYMPH NODE (BREAST) RIGHT Right 2021     US BREAST CORE BIOPSY RIGHT Right 2021     Physical Exam:    /65 (BP Location: Right arm, Cuff Size: Adult Regular)   Pulse 94   Temp 98.3  F (36.8  C) (Oral)   Resp 16   Ht 1.524 m (5')   Wt 56.2 kg (123 lb 14.4 oz)   SpO2 98%   BMI 24.20 kg/m      GENERAL:       Alert and oriented.  Seated comfortably.  No acute distress.     HEENT:             Atraumatic and normocephalic.  LISSETH.  EOMI.  No pallor.  No icterus.  No mucosal lesions.     LYMPH NODES:  No palpable, cervical, axillary or inguinal lymphadenopathy.     CHEST:               Lungs clear to auscultation bilaterally.    BREAST:  (R) - no palpable lump or concerning skin/nipple changes.     CVS:                      S1 and S2 heard.  Regular rate and rhythm.  No murmur, gallop or rub heard.  No peripheral edema.     ABDOMEN:          Soft.  Not tender or distended. No palpable hepatomegaly or splenomegaly, masses or ascites.  Bowel sounds heard.     EXTREMITIES Warm.     SKIN:               No rash, bruising or purpura noted.  Partial alopecia.    Lab Results:    Reviewed with patient and her sister.    Recent Results (from the past 24 hour(s))   Comprehensive metabolic panel    Collection Time: 08/05/21  8:26 AM   Result Value Ref Range    Sodium 138 136 - 145 mmol/L    Potassium 4.0 3.5 - 5.0 mmol/L    Chloride 106 98 - 107 mmol/L    Carbon Dioxide (CO2) 24 22 - 31 mmol/L    Anion Gap 8 5 - 18 mmol/L    Urea Nitrogen 9 8 - 22 mg/dL    Creatinine 0.61 0.60 - 1.10 mg/dL    Calcium 9.6 8.5 - 10.5 mg/dL    Glucose 128 (H) 70 - 125 mg/dL    Alkaline Phosphatase 75 45 - 120 U/L    AST 14 0 - 40 U/L    ALT 16 0 - 45 U/L    Protein Total 7.0 6.0 - 8.0 g/dL    Albumin 4.0 3.5 - 5.0 g/dL    Bilirubin Total 0.4 0.0 - 1.0 mg/dL    GFR Estimate >90 >60 mL/min/1.73m2   CBC with platelets and differential    Collection Time: 08/05/21  8:26 AM   Result Value Ref Range    WBC Count 6.5 4.0 - 11.0 10e3/uL    RBC Count 3.65 (L) 3.80 - 5.20 10e6/uL    Hemoglobin 11.4 (L) 11.7 - 15.7 g/dL    Hematocrit 35.3 35.0 - 47.0 %    MCV 97 78 - 100 fL    MCH 31.2 26.5 - 33.0 pg    MCHC 32.3 31.5 - 36.5 g/dL    RDW 15.9 (H) 10.0 - 15.0 %    Platelet Count 191 150 - 450 10e3/uL    % Neutrophils 78 %    % Lymphocytes 19 %    % Monocytes 2 %    % Eosinophils 0 %    % Basophils 0 %    % Immature Granulocytes 1 %    NRBCs per 100 WBC 0 <1 /100    Absolute Neutrophils 5.2 1.6 - 8.3 10e3/uL    Absolute Lymphocytes 1.2 0.8 - 5.3 10e3/uL    Absolute Monocytes 0.1 0.0 - 1.3 10e3/uL    Absolute Eosinophils 0.0 0.0 - 0.7 10e3/uL    Absolute Basophils 0.0 0.0 - 0.2 10e3/uL    Absolute Immature Granulocytes 0.0 <=0.0 10e3/uL    Absolute NRBCs 0.0 10e3/uL     Imaging:    No results found.

## 2021-08-05 NOTE — PROGRESS NOTES
Oncology Rooming Note    August 5, 2021 8:48 AM   Joanne Colon is a 36 year old female who presents for:    Chief Complaint   Patient presents with     Oncology Clinic Visit     Initial Vitals: /65 (BP Location: Right arm, Cuff Size: Adult Regular)   Pulse 94   Temp 98.3  F (36.8  C) (Oral)   Resp 16   Ht 1.524 m (5')   Wt 56.2 kg (123 lb 14.4 oz)   SpO2 98%   BMI 24.20 kg/m   Estimated body mass index is 24.2 kg/m  as calculated from the following:    Height as of this encounter: 1.524 m (5').    Weight as of this encounter: 56.2 kg (123 lb 14.4 oz). Body surface area is 1.54 meters squared.  No Pain (0) Comment: Data Unavailable   No LMP recorded.  Allergies reviewed: Yes  Medications reviewed: Yes    Medications: MEDICATION REFILLS NEEDED TODAY. Provider was notified.  Pharmacy name entered into [employer]: [id number] [address] AT [facility]    Clinical concerns: nausea/decreased appetite 6-7 days post tx, and acid reflux post tx.    Selene Hernandez

## 2021-08-05 NOTE — PROGRESS NOTES
Infusion Nursing Note:  Joanne Colon presents today for chemo infusion.    Patient seen by provider today: Yes: Td Padilla NP   present during visit today: Yes, Language: Serbian. Pt preferring to have sister translate.     Note: Pt premedicated with Aloxi and Emend/dex.      Intravenous Access:  Implanted Port, positional but patent.    Treatment Conditions:  Lab Results   Component Value Date     08/05/2021                   Lab Results   Component Value Date    POTASSIUM 4.0 08/05/2021           Lab Results   Component Value Date    MAG 1.8 06/24/2021            Lab Results   Component Value Date    CR 0.61 08/05/2021                   Lab Results   Component Value Date    TANNER 9.6 08/05/2021                Lab Results   Component Value Date    BILITOTAL 0.4 08/05/2021           Lab Results   Component Value Date    ALBUMIN 4.0 08/05/2021                    Lab Results   Component Value Date    ALT 16 08/05/2021           Lab Results   Component Value Date    AST 14 08/05/2021       Results reviewed, labs MET treatment parameters, ok to proceed with treatment.      Post Infusion Assessment:  Patient tolerated infusion without incident.  Blood return noted pre and post infusion.  Site patent and intact, free from redness, edema or discomfort.  No evidence of extravasations.  Access discontinued per protocol.       Discharge Plan:   Patient discharged in stable condition accompanied by: self.  Departure Mode: Ambulatory.      Amna ALMODOVAR RN

## 2021-08-05 NOTE — PATIENT INSTRUCTIONS
Diarrhea - 2 Imodium with 1st episode diarrhea/day followed by 1 tab after each subsequent episode, maximum 8 tabs day.    Nausea - dexamethasone scheduled + Compazine and/or Compazine.    Heartburn - omeprazole 20 mg 1-2 x daily.      Recent Results (from the past 24 hour(s))   Comprehensive metabolic panel    Collection Time: 08/05/21  8:26 AM   Result Value Ref Range    Sodium 138 136 - 145 mmol/L    Potassium 4.0 3.5 - 5.0 mmol/L    Chloride 106 98 - 107 mmol/L    Carbon Dioxide (CO2) 24 22 - 31 mmol/L    Anion Gap 8 5 - 18 mmol/L    Urea Nitrogen 9 8 - 22 mg/dL    Creatinine 0.61 0.60 - 1.10 mg/dL    Calcium 9.6 8.5 - 10.5 mg/dL    Glucose 128 (H) 70 - 125 mg/dL    Alkaline Phosphatase 75 45 - 120 U/L    AST 14 0 - 40 U/L    ALT 16 0 - 45 U/L    Protein Total 7.0 6.0 - 8.0 g/dL    Albumin 4.0 3.5 - 5.0 g/dL    Bilirubin Total 0.4 0.0 - 1.0 mg/dL    GFR Estimate >90 >60 mL/min/1.73m2   CBC with platelets and differential    Collection Time: 08/05/21  8:26 AM   Result Value Ref Range    WBC Count 6.5 4.0 - 11.0 10e3/uL    RBC Count 3.65 (L) 3.80 - 5.20 10e6/uL    Hemoglobin 11.4 (L) 11.7 - 15.7 g/dL    Hematocrit 35.3 35.0 - 47.0 %    MCV 97 78 - 100 fL    MCH 31.2 26.5 - 33.0 pg    MCHC 32.3 31.5 - 36.5 g/dL    RDW 15.9 (H) 10.0 - 15.0 %    Platelet Count 191 150 - 450 10e3/uL    % Neutrophils 78 %    % Lymphocytes 19 %    % Monocytes 2 %    % Eosinophils 0 %    % Basophils 0 %    % Immature Granulocytes 1 %    NRBCs per 100 WBC 0 <1 /100    Absolute Neutrophils 5.2 1.6 - 8.3 10e3/uL    Absolute Lymphocytes 1.2 0.8 - 5.3 10e3/uL    Absolute Monocytes 0.1 0.0 - 1.3 10e3/uL    Absolute Eosinophils 0.0 0.0 - 0.7 10e3/uL    Absolute Basophils 0.0 0.0 - 0.2 10e3/uL    Absolute Immature Granulocytes 0.0 <=0.0 10e3/uL    Absolute NRBCs 0.0 10e3/uL

## 2021-08-06 ENCOUNTER — INFUSION THERAPY VISIT (OUTPATIENT)
Dept: INFUSION THERAPY | Facility: CLINIC | Age: 36
End: 2021-08-06
Attending: INTERNAL MEDICINE
Payer: COMMERCIAL

## 2021-08-06 DIAGNOSIS — Z17.0 MALIGNANT NEOPLASM OF NIPPLE OF RIGHT BREAST IN FEMALE, ESTROGEN RECEPTOR POSITIVE (H): Primary | ICD-10-CM

## 2021-08-06 DIAGNOSIS — C50.011 MALIGNANT NEOPLASM OF NIPPLE OF RIGHT BREAST IN FEMALE, ESTROGEN RECEPTOR POSITIVE (H): Primary | ICD-10-CM

## 2021-08-06 DIAGNOSIS — Z51.11 ENCOUNTER FOR ANTINEOPLASTIC CHEMOTHERAPY: ICD-10-CM

## 2021-08-06 PROCEDURE — 96372 THER/PROPH/DIAG INJ SC/IM: CPT | Performed by: NURSE PRACTITIONER

## 2021-08-06 PROCEDURE — 250N000011 HC RX IP 250 OP 636: Performed by: NURSE PRACTITIONER

## 2021-08-06 RX ADMIN — PEGFILGRASTIM-CBQV 6 MG: 6 INJECTION, SOLUTION SUBCUTANEOUS at 15:09

## 2021-08-10 ENCOUNTER — TELEPHONE (OUTPATIENT)
Dept: ONCOLOGY | Facility: CLINIC | Age: 36
End: 2021-08-10

## 2021-08-10 NOTE — TELEPHONE ENCOUNTER
[employer] [facility] paperwork was signed by Dr. Sanchez and faxed to [employer] Specailist,  Selene Collins at [phone].  -Paperwork sent to HIM for scanning/Kira Ham RN

## 2021-08-11 ENCOUNTER — ONCOLOGY VISIT (OUTPATIENT)
Dept: ONCOLOGY | Facility: CLINIC | Age: 36
End: 2021-08-11
Payer: COMMERCIAL

## 2021-08-11 ENCOUNTER — TELEPHONE (OUTPATIENT)
Dept: ONCOLOGY | Facility: HOSPITAL | Age: 36
End: 2021-08-11

## 2021-08-11 VITALS
OXYGEN SATURATION: 99 % | RESPIRATION RATE: 16 BRPM | BODY MASS INDEX: 24.02 KG/M2 | WEIGHT: 123 LBS | TEMPERATURE: 97.4 F

## 2021-08-11 DIAGNOSIS — K12.30 STOMATITIS AND MUCOSITIS: Primary | ICD-10-CM

## 2021-08-11 DIAGNOSIS — K12.1 STOMATITIS AND MUCOSITIS: Primary | ICD-10-CM

## 2021-08-11 PROCEDURE — 99214 OFFICE O/P EST MOD 30 MIN: CPT | Performed by: INTERNAL MEDICINE

## 2021-08-11 PROCEDURE — G0463 HOSPITAL OUTPT CLINIC VISIT: HCPCS

## 2021-08-11 RX ORDER — ACETAMINOPHEN 500 MG
500 TABLET ORAL EVERY 6 HOURS PRN
COMMUNITY
End: 2021-11-11

## 2021-08-11 RX ORDER — TRAMADOL HYDROCHLORIDE 50 MG/1
25-50 TABLET ORAL EVERY 6 HOURS PRN
Qty: 20 TABLET | Refills: 0 | Status: SHIPPED | OUTPATIENT
Start: 2021-08-11 | End: 2021-08-16

## 2021-08-11 RX ORDER — LIDOCAINE HYDROCHLORIDE 20 MG/ML
JELLY TOPICAL 3 TIMES DAILY
Qty: 85 G | Refills: 0 | Status: SHIPPED | OUTPATIENT
Start: 2021-08-11 | End: 2021-08-26

## 2021-08-11 ASSESSMENT — PAIN SCALES - GENERAL: PAINLEVEL: WORST PAIN (10)

## 2021-08-11 NOTE — LETTER
8/11/2021         RE: Joanne Colon  [address] [place] [place] [address]        Dear Colleague,    Thank you for referring your patient, Joanne Colon, to the [facility] [place]. Please see a copy of my visit note below.    Oncology Rooming Note    August 11, 2021 1:41 PM   Joanne Colon is a 36 year old female who presents for:    Chief Complaint   Patient presents with     Oncology Clinic Visit     Breast Cancer     Initial Vitals: /64   Pulse 109   Temp 97.4  F (36.3  C)   Resp 16   SpO2 99%  Estimated body mass index is 24.2 kg/m  as calculated from the following:    Height as of 8/5/21: 1.524 m (5').    Weight as of 8/5/21: 56.2 kg (123 lb 14.4 oz). There is no height or weight on file to calculate BSA.  Worst Pain (10) Comment: Data Unavailable   No LMP recorded.  Allergies reviewed: Yes  Medications reviewed: Yes    Medications: Medication refills not needed today.  Pharmacy name entered into [employer]: [id number] - [place], [place] - [address] [facility]  AT [facility]    Clinical concerns: Patient reports to clinic today with pain 10/10 , started yesterday. Pain is constant. She has had one episode of diarrhea today and 3 times yesterday. She has not  Inspected the area to know if it is reddened. She has not noted any open areas. She continues to be able to eat and drink ok. She took 500 mg of ES tylenol today. She felt dizzy overnight after she had gone to the bathroom, she attributed it to being dizzy. She is not orthostatic    FAITH COBOS RN              [facility] Hematology and Oncology Progress Note    Patient: Joanne Colon  MRN: [id number]  Date of Service: Aug 11, 2021         Reason for Visit    Chief Complaint   Patient presents with     Oncology Clinic Visit     Breast Cancer       Assessment and Plan    Cancer Staging  Malignant neoplasm of nipple of right breast in female, estrogen receptor positive (H)  Staging form: Breast, AJCC 8th  Edition  - Clinical stage from 4/21/2021: Stage IB (cT2, cN1(f), cM0, G3, ER+, ND+, HER2+) - Signed by Chelyl Sanchez MD on 8/17/2021      ECOG Performance    0 - Independent     Pain  Pain Score: Worst Pain (10)      #.  Severe stomatitis of anorectal region with severe pain   She is about a 1 week out from last chemotherapy.  Discussed about similar pathophysiology of stomatitis and oral mucositis.  I gave her prescription tramadol to help her pain.  I also prescribed topical lidocaine for pain relief.  I advised her to not use water to clean the area after each bathroom use.  I advised her to use fragrance free wipes to be less irritated.  No signs of infection.  Expectant improvement in the next few days.  She is advised to call us with concern or not improved as expected.    #. cT2 N1 M0 invasive ductal carcinoma, grade 3, triple positive.   She is now completed 5 cycles of neoadjuvant chemotherapy.  Good clinical response.  I refer her back to Dr. Perdomo today for surgical planning.  She will complete neoadjuvant chemotherapy after next cycle.   We again discussed about after surgery and potential treatment plans.    #.  Mild to moderate diarrhea, secondary to chemotherapy.   She is not getting better.  She will continue with supportive care.  Advised her to maintain oral fluids.    Encounter Diagnoses:    Problem List Items Addressed This Visit     None      Visit Diagnoses     Stomatitis and mucositis    -  Primary             CC: Ye Lira MD   ______________________________________________________________________________  Diagnosis  4/2021-presented to the emergency room with a mass in her right breast along with pain for about 3 months or so.  Denies nipple changes or discharge.  Underwent diagnostic mammogram and ultrasound on 4/13/2021 and it confirmed right breast mass in the 8 o'clock position, 5 cm from the nipple of 2.2 x 2.8 x 1.4 cm with abnormal appearing right axillary lymph node of 1.7  cm with cortical thickness.  She underwent ultrasound-guided core needle biopsy on the same day and it showed invasive ductal carcinoma, grade 3, ER moderate to strong positive, VT strongly positive, HER-2 positive by IHC.              - She met with Dr. Perdomo from surgery and recommended to consider neoadjuvant chemotherapy to HER-2 positive lymph node positive disease.     LMP- 5/10/2021     Treatment to date  2021-initiated neoadjuvant TCHP.  Required hospitalization with neutropenic fever, neutropenic colitis after cycle #1.  Neulasta added starting cycle #2 chemotherapy.    History of Present Illness    Ms. Joanne Colon presented today accompanied by her sister.  She requested to be seen urgently today because of the severe rectal pain, 10 out of 10.  She has not inspected and does not know of if it is red, draining.  She does not think any open areas.  She has diarrhea for about 3 days and no 1 time today.  She is feeling dizzy.  She takes Tylenol for pain.  She is very uncomfortable sitting.  She has been eating and drinking very well.    Review of systems  Apart from describing in HPI, the remainder of comprehensive ROS was negative.    Past History    Past Medical History:   Diagnosis Date     Breast cancer (H)        Past Surgical History:   Procedure Laterality Date      SECTION      x two     IR CHEST PORT PLACEMENT > 5 YRS OF AGE  2021     IR PORT PLACEMENT >5 YEARS  2021     US BIOPSY FINE NEEDLE ASPIRATION LYMPH NODE (BREAST) RIGHT Right 2021     US BREAST CORE BIOPSY RIGHT Right 2021         Physical Exam    Temp 97.4  F (36.3  C)   Resp 16   Wt 55.8 kg (123 lb)   SpO2 99%   BMI 24.02 kg/m        General: alert, awake, not in acute distress  HEENT: Head: Normal, normocephalic, atraumatic.  Eye: Normal external eye, conjunctiva, lids cornea, LISSETH.  Nose: Normal external nose, mucus membranes and septum.  Pharynx: Normal buccal mucosa. Normal pharynx.  Neck /  Thyroid: Supple, no masses, nodes, nodules or enlargement.  Lymphatics: No abnormally enlarged lymph nodes.  Chest: Normal chest wall and respirations. Clear to auscultation.  Heart: S1 S2 RRR, no murmur.   Abdomen: abdomen is soft without significant tenderness, masses, organomegaly or guarding  Perineum exam showed inflamed anorectal region with severe stomatitis with mucus drainage.  No bleeding.  No signs of active infection.  Extremities: normal strength, tone, and muscle mass  Skin: normal. no rash or abnormalities  CNS: non focal.    Lab Results    No results found for this or any previous visit (from the past 168 hour(s)).    Imaging    No results found.    Signed by: Chelly Sanchez MD        Again, thank you for allowing me to participate in the care of your patient.        Sincerely,        Chelly Sanchez MD

## 2021-08-11 NOTE — TELEPHONE ENCOUNTER
Pt's sister calls clinic stating that pt is having significant rectal pain.  Pt is unable to sit or get comfortable because of pain.  Per Dr. Sanchez, pt can be added to her schedule to be seen in clinic for further evaluation of pain.  Shadi states that she can bring pt in at 1330.   added pt to schedule and they will arrive around 1320.  Shadi verbalized understanding and appreciative of information.

## 2021-08-11 NOTE — PROGRESS NOTES
Oncology Rooming Note    August 11, 2021 1:41 PM   Joanne Colon is a 36 year old female who presents for:    Chief Complaint   Patient presents with     Oncology Clinic Visit     Breast Cancer     Initial Vitals: /64   Pulse 109   Temp 97.4  F (36.3  C)   Resp 16   SpO2 99%  Estimated body mass index is 24.2 kg/m  as calculated from the following:    Height as of 8/5/21: 1.524 m (5').    Weight as of 8/5/21: 56.2 kg (123 lb 14.4 oz). There is no height or weight on file to calculate BSA.  Worst Pain (10) Comment: Data Unavailable   No LMP recorded.  Allergies reviewed: Yes  Medications reviewed: Yes    Medications: Medication refills not needed today.  Pharm[facility] into [id number] [place] [id number] [facility]  AT [facility]    Clinical concerns: Patient reports to clinic today with pain 10/10 , started yesterday. Pain is constant. She has had one episode of diarrhea today and 3 times yesterday. She has not  Inspected the area to know if it is reddened. She has not noted any open areas. She continues to be able to eat and drink ok. She took 500 mg of ES tylenol today. She felt dizzy overnight after she had gone to the bathroom, she attributed it to being dizzy. She is not orthostatic    FAITH COBOS RN

## 2021-08-12 ENCOUNTER — TELEPHONE (OUTPATIENT)
Dept: SURGERY | Facility: CLINIC | Age: 36
End: 2021-08-12

## 2021-08-12 NOTE — TELEPHONE ENCOUNTER
Per Dr. Sanchez's RN's request, called patient to help her schedule a follow up surgical consult with Dr. Perdomo as patient is nearing the end of her NAC.  Talked with Shadi, made appointment for Thursday, 8-19-21 at 0840 at the [facility].

## 2021-08-18 NOTE — PROGRESS NOTES
[facility] Hematology and Oncology Progress Note    Patient: Joanne Colon  MRN: [id number]  Date of Service: Aug 11, 2021         Reason for Visit    Chief Complaint   Patient presents with     Oncology Clinic Visit     Breast Cancer       Assessment and Plan    Cancer Staging  Malignant neoplasm of nipple of right breast in female, estrogen receptor positive (H)  Staging form: Breast, AJCC 8th Edition  - Clinical stage from 4/21/2021: Stage IB (cT2, cN1(f), cM0, G3, ER+, AR+, HER2+) - Signed by Chelly Sanchez MD on 8/17/2021      ECOG Performance    0 - Independent     Pain  Pain Score: Worst Pain (10)      #.  Severe stomatitis of anorectal region with severe pain   She is about a 1 week out from last chemotherapy.  Discussed about similar pathophysiology of stomatitis and oral mucositis.  I gave her prescription tramadol to help her pain.  I also prescribed topical lidocaine for pain relief.  I advised her to not use water to clean the area after each bathroom use.  I advised her to use fragrance free wipes to be less irritated.  No signs of infection.  Expectant improvement in the next few days.  She is advised to call us with concern or not improved as expected.    #. cT2 N1 M0 invasive ductal carcinoma, grade 3, triple positive.   She is now completed 5 cycles of neoadjuvant chemotherapy.  Good clinical response.  I refer her back to Dr. Perdomo today for surgical planning.  She will complete neoadjuvant chemotherapy after next cycle.   We again discussed about after surgery and potential treatment plans.    #.  Mild to moderate diarrhea, secondary to chemotherapy.   She is not getting better.  She will continue with supportive care.  Advised her to maintain oral fluids.    Encounter Diagnoses:    Problem List Items Addressed This Visit     None      Visit Diagnoses     Stomatitis and mucositis    -  Primary             CC: Ye Lira MD    ______________________________________________________________________________  Diagnosis  2021-presented to the emergency room with a mass in her right breast along with pain for about 3 months or so.  Denies nipple changes or discharge.  Underwent diagnostic mammogram and ultrasound on 2021 and it confirmed right breast mass in the 8 o'clock position, 5 cm from the nipple of 2.2 x 2.8 x 1.4 cm with abnormal appearing right axillary lymph node of 1.7 cm with cortical thickness.  She underwent ultrasound-guided core needle biopsy on the same day and it showed invasive ductal carcinoma, grade 3, ER moderate to strong positive, IA strongly positive, HER-2 positive by IHC.              - She met with Dr. Perdomo from surgery and recommended to consider neoadjuvant chemotherapy to HER-2 positive lymph node positive disease.     LMP- 5/10/2021     Treatment to date  2021-initiated neoadjuvant TCHP.  Required hospitalization with neutropenic fever, neutropenic colitis after cycle #1.  Neulasta added starting cycle #2 chemotherapy.    History of Present Illness    Ms. Joanne Colon presented today accompanied by her sister.  She requested to be seen urgently today because of the severe rectal pain, 10 out of 10.  She has not inspected and does not know of if it is red, draining.  She does not think any open areas.  She has diarrhea for about 3 days and no 1 time today.  She is feeling dizzy.  She takes Tylenol for pain.  She is very uncomfortable sitting.  She has been eating and drinking very well.    Review of systems  Apart from describing in HPI, the remainder of comprehensive ROS was negative.    Past History    Past Medical History:   Diagnosis Date     Breast cancer (H)        Past Surgical History:   Procedure Laterality Date      SECTION      x two     IR CHEST PORT PLACEMENT > 5 YRS OF AGE  2021     IR PORT PLACEMENT >5 YEARS  2021     US BIOPSY FINE NEEDLE ASPIRATION LYMPH NODE  (BREAST) RIGHT Right 4/13/2021     US BREAST CORE BIOPSY RIGHT Right 4/13/2021         Physical Exam    Temp 97.4  F (36.3  C)   Resp 16   Wt 55.8 kg (123 lb)   SpO2 99%   BMI 24.02 kg/m        General: alert, awake, not in acute distress  HEENT: Head: Normal, normocephalic, atraumatic.  Eye: Normal external eye, conjunctiva, lids cornea, LISSETH.  Nose: Normal external nose, mucus membranes and septum.  Pharynx: Normal buccal mucosa. Normal pharynx.  Neck / Thyroid: Supple, no masses, nodes, nodules or enlargement.  Lymphatics: No abnormally enlarged lymph nodes.  Chest: Normal chest wall and respirations. Clear to auscultation.  Heart: S1 S2 RRR, no murmur.   Abdomen: abdomen is soft without significant tenderness, masses, organomegaly or guarding  Perineum exam showed inflamed anorectal region with severe stomatitis with mucus drainage.  No bleeding.  No signs of active infection.  Extremities: normal strength, tone, and muscle mass  Skin: normal. no rash or abnormalities  CNS: non focal.    Lab Results    No results found for this or any previous visit (from the past 168 hour(s)).    Imaging    No results found.    Signed by: Chelly Sanchez MD

## 2021-08-19 ENCOUNTER — OFFICE VISIT (OUTPATIENT)
Dept: SURGERY | Facility: CLINIC | Age: 36
End: 2021-08-19
Attending: FAMILY MEDICINE
Payer: COMMERCIAL

## 2021-08-19 VITALS — WEIGHT: 123 LBS | RESPIRATION RATE: 16 BRPM | BODY MASS INDEX: 24.15 KG/M2 | HEIGHT: 60 IN

## 2021-08-19 DIAGNOSIS — C50.611 MALIGNANT NEOPLASM OF AXILLARY TAIL OF RIGHT BREAST IN FEMALE, ESTROGEN RECEPTOR POSITIVE (H): Primary | ICD-10-CM

## 2021-08-19 DIAGNOSIS — Z17.0 MALIGNANT NEOPLASM OF AXILLARY TAIL OF RIGHT BREAST IN FEMALE, ESTROGEN RECEPTOR POSITIVE (H): Primary | ICD-10-CM

## 2021-08-19 PROCEDURE — 99214 OFFICE O/P EST MOD 30 MIN: CPT | Performed by: SPECIALIST

## 2021-08-19 PROCEDURE — G0463 HOSPITAL OUTPT CLINIC VISIT: HCPCS

## 2021-08-19 ASSESSMENT — MIFFLIN-ST. JEOR: SCORE: 1159.42

## 2021-08-19 NOTE — PROGRESS NOTES
Joanne comes in for follow-up of her right breast cancer.  This is a patient who was noted to have a mass in her right breast as well as in the axilla.  She was found to have a HER-2 positive cancer.  She was referred oncology for chemotherapy which she has been receiving.  She has 1 more dose left.  She feels like the mass has completely gone away.    Past medical history:  Past Medical History:   Diagnosis Date     Breast cancer (H)      Past Surgical History:   Procedure Laterality Date      SECTION      x two     IR CHEST PORT PLACEMENT > 5 YRS OF AGE  2021     IR PORT PLACEMENT >5 YEARS  2021     US BIOPSY FINE NEEDLE ASPIRATION LYMPH NODE (BREAST) RIGHT Right 2021     US BREAST CORE BIOPSY RIGHT Right 2021     Current Outpatient Medications   Medication Instructions     acetaminophen (TYLENOL) 500 mg, Oral, EVERY 6 HOURS PRN     calcium carbonate (TUMS) 500 MG chewable tablet 1 chew tab, Oral, 2 TIMES DAILY PRN     dexamethasone (DECADRON) 4 mg, Oral, 2 TIMES DAILY WITH MEALS, Taking 8mg every 12hours for 3 days starting the day before chemo     lidocaine (XYLOCAINE) 2 % external gel Topical, 3 TIMES DAILY     loperamide (IMODIUM) 2 mg, Oral, 4 TIMES DAILY PRN     magic mouthwash suspension (diphenhydrAMINE, lidocaine, aluminum-magnesium & simethicone) 10 mLs, EVERY 6 HOURS PRN     ondansetron (ZOFRAN) 4 mg, Oral, EVERY 8 HOURS PRN     prochlorperazine (COMPAZINE) 10 mg, Oral     study - lidocaine/prilocaine, IDS# [id number], 2.5-2.5 % external cream Topical, PRN, Apply topically as directed prior to blood draw.       No Known Allergies     Social History     Tobacco Use     Smoking status: Never Smoker     Smokeless tobacco: Never Used   Substance Use Topics     Alcohol use: Not Currently     Drug use: Not Currently     Physical exam:  General: Appears well.  Lungs: Clear  CV: Regular  Breast: No significant palpable mass in her breast.  There is just a slight nodularity where it  previously felt the mass.  No skin changes.  No skin puckering.  Axilla: No palpable axillary adenopathy.    Impression: Right breast cancer, HER-2 positive with excellent clinical response to chemotherapy.  Had a long talk with her today about her options for treatment which include lumpectomy versus mastectomy.  Either way we would recommend radiation because she was node positive to start with.  Explained that her survival is exactly the same with what ever surgery she does.  She is now a very good candidate for a lumpectomy.  She at first was thinking she wanted a mastectomy but once we made it very clear that her survival is the same, she is now agreeable to a lumpectomy.  We will also have them put a wire into the lymph node that was biopsied to be positive and will do a sentinel lymph node injection.  Plan: We will get that surgery set up about 3 weeks after her last chemotherapy which would put us to mid September.  This will be an outpatient surgery.  Because of the situation with her insurance the surgery needs to be done at [facility].

## 2021-08-19 NOTE — LETTER
2021         RE: Joanne Colon  [address] [place] [address]        Dear Colleague,    Thank you for referring your patient, Joanne Colon, to the [facility] [place]. Please see a copy of my visit note below.    Joanne comes in for follow-up of her right breast cancer.  This is a patient who was noted to have a mass in her right breast as well as in the axilla.  She was found to have a HER-2 positive cancer.  She was referred oncology for chemotherapy which she has been receiving.  She has 1 more dose left.  She feels like the mass has completely gone away.    Past medical history:  Past Medical History:   Diagnosis Date     Breast cancer (H)      Past Surgical History:   Procedure Laterality Date      SECTION      x two     IR CHEST PORT PLACEMENT > 5 YRS OF AGE  2021     IR PORT PLACEMENT >5 YEARS  2021     US BIOPSY FINE NEEDLE ASPIRATION LYMPH NODE (BREAST) RIGHT Right 2021     US BREAST CORE BIOPSY RIGHT Right 2021     Current Outpatient Medications   Medication Instructions     acetaminophen (TYLENOL) 500 mg, Oral, EVERY 6 HOURS PRN     calcium carbonate (TUMS) 500 MG chewable tablet 1 chew tab, Oral, 2 TIMES DAILY PRN     dexamethasone (DECADRON) 4 mg, Oral, 2 TIMES DAILY WITH MEALS, Taking 8mg every 12hours for 3 days starting the day before chemo     lidocaine (XYLOCAINE) 2 % external gel Topical, 3 TIMES DAILY     loperamide (IMODIUM) 2 mg, Oral, 4 TIMES DAILY PRN     magic mouthwash suspension (diphenhydrAMINE, lidocaine, aluminum-magnesium & simethicone) 10 mLs, EVERY 6 HOURS PRN     ondansetron (ZOFRAN) 4 mg, Oral, EVERY 8 HOURS PRN     prochlorperazine (COMPAZINE) 10 mg, Oral     study - lidocaine/prilocaine, IDS# [id number], 2.5-2.5 % external cream Topical, PRN, Apply topically as directed prior to blood draw.       No Known Allergies     Social History     Tobacco Use     Smoking status: Never Smoker     Smokeless tobacco: Never Used    Substance Use Topics     Alcohol use: Not Currently     Drug use: Not Currently     Physical exam:  General: Appears well.  Lungs: Clear  CV: Regular  Breast: No significant palpable mass in her breast.  There is just a slight nodularity where it previously felt the mass.  No skin changes.  No skin puckering.  Axilla: No palpable axillary adenopathy.    Impression: Right breast cancer, HER-2 positive with excellent clinical response to chemotherapy.  Had a long talk with her today about her options for treatment which include lumpectomy versus mastectomy.  Either way we would recommend radiation because she was node positive to start with.  Explained that her survival is exactly the same with what ever surgery she does.  She is now a very good candidate for a lumpectomy.  She at first was thinking she wanted a mastectomy but once we made it very clear that her survival is the same, she is now agreeable to a lumpectomy.  We will also have them put a wire into the lymph node that was biopsied to be positive and will do a sentinel lymph node injection.  Plan: We will get that surgery set up about 3 weeks after her last chemotherapy which would put us to mid September.  This will be an outpatient surgery.  Because of the situation with her insurance the surgery needs to be done at [facility].        Again, thank you for allowing me to participate in the care of your patient.        Sincerely,        Delaney Perdomo MD

## 2021-08-19 NOTE — NURSING NOTE
Joanne presents to [facility] [place] today for a surgical consult with Dr. Perdomo  regarding her right breast, Her 2 +cancer.  She has been receiving NAC.  She has one treatment left.  She sees Dr. Sanchez.  She is accompanied by her friend, Yumiko,  for consult.  RN assessment and EMR update. Resp 16   Ht 1.524 m (5')   Wt 55.8 kg (123 lb)   Breastfeeding No   BMI 24.02 kg/m    Patient given a Breast Cancer Packet, contents reviewed.  She met with Dr. Perdomo  see dictation for details of visit. She will plan a wire loc lumpectomy.  Pre and post op teaching complete.   Ramandeep to call her   for surgery scheduling.  Support provided, invited calls.  RN time 20 mins.

## 2021-08-25 ENCOUNTER — PREP FOR PROCEDURE (OUTPATIENT)
Dept: SURGERY | Facility: CLINIC | Age: 36
End: 2021-08-25

## 2021-08-25 DIAGNOSIS — C50.611 MALIGNANT NEOPLASM OF AXILLARY TAIL OF RIGHT BREAST IN FEMALE, ESTROGEN RECEPTOR POSITIVE (H): Primary | ICD-10-CM

## 2021-08-25 DIAGNOSIS — Z17.0 MALIGNANT NEOPLASM OF AXILLARY TAIL OF RIGHT BREAST IN FEMALE, ESTROGEN RECEPTOR POSITIVE (H): Primary | ICD-10-CM

## 2021-08-26 ENCOUNTER — LAB (OUTPATIENT)
Dept: INFUSION THERAPY | Facility: CLINIC | Age: 36
End: 2021-08-26
Payer: COMMERCIAL

## 2021-08-26 ENCOUNTER — ONCOLOGY VISIT (OUTPATIENT)
Dept: ONCOLOGY | Facility: CLINIC | Age: 36
End: 2021-08-26
Attending: NURSE PRACTITIONER
Payer: COMMERCIAL

## 2021-08-26 VITALS
RESPIRATION RATE: 16 BRPM | HEART RATE: 82 BPM | SYSTOLIC BLOOD PRESSURE: 104 MMHG | HEIGHT: 60 IN | OXYGEN SATURATION: 100 % | TEMPERATURE: 97.9 F | WEIGHT: 122.8 LBS | BODY MASS INDEX: 24.11 KG/M2 | DIASTOLIC BLOOD PRESSURE: 68 MMHG

## 2021-08-26 DIAGNOSIS — Z17.0 MALIGNANT NEOPLASM OF NIPPLE OF RIGHT BREAST IN FEMALE, ESTROGEN RECEPTOR POSITIVE (H): Primary | ICD-10-CM

## 2021-08-26 DIAGNOSIS — C50.011 MALIGNANT NEOPLASM OF NIPPLE OF RIGHT BREAST IN FEMALE, ESTROGEN RECEPTOR POSITIVE (H): Primary | ICD-10-CM

## 2021-08-26 DIAGNOSIS — Z51.11 ENCOUNTER FOR ANTINEOPLASTIC CHEMOTHERAPY: ICD-10-CM

## 2021-08-26 DIAGNOSIS — Z17.0 MALIGNANT NEOPLASM OF UPPER-INNER QUADRANT OF RIGHT BREAST IN FEMALE, ESTROGEN RECEPTOR POSITIVE (H): ICD-10-CM

## 2021-08-26 DIAGNOSIS — S31.000D WOUND OF SACRAL REGION, SUBSEQUENT ENCOUNTER: Primary | ICD-10-CM

## 2021-08-26 DIAGNOSIS — C50.211 MALIGNANT NEOPLASM OF UPPER-INNER QUADRANT OF RIGHT BREAST IN FEMALE, ESTROGEN RECEPTOR POSITIVE (H): ICD-10-CM

## 2021-08-26 LAB
ALBUMIN SERPL-MCNC: 4.2 G/DL (ref 3.5–5)
ALP SERPL-CCNC: 72 U/L (ref 45–120)
ALT SERPL W P-5'-P-CCNC: 16 U/L (ref 0–45)
ANION GAP SERPL CALCULATED.3IONS-SCNC: 11 MMOL/L (ref 5–18)
AST SERPL W P-5'-P-CCNC: 16 U/L (ref 0–40)
BASOPHILS # BLD AUTO: 0 10E3/UL (ref 0–0.2)
BASOPHILS NFR BLD AUTO: 0 %
BILIRUB SERPL-MCNC: 0.4 MG/DL (ref 0–1)
BUN SERPL-MCNC: 9 MG/DL (ref 8–22)
CALCIUM SERPL-MCNC: 10.1 MG/DL (ref 8.5–10.5)
CHLORIDE BLD-SCNC: 103 MMOL/L (ref 98–107)
CO2 SERPL-SCNC: 24 MMOL/L (ref 22–31)
CREAT SERPL-MCNC: 0.63 MG/DL (ref 0.6–1.1)
EOSINOPHIL # BLD AUTO: 0 10E3/UL (ref 0–0.7)
EOSINOPHIL NFR BLD AUTO: 0 %
ERYTHROCYTE [DISTWIDTH] IN BLOOD BY AUTOMATED COUNT: 14.6 % (ref 10–15)
GFR SERPL CREATININE-BSD FRML MDRD: >90 ML/MIN/1.73M2
GLUCOSE BLD-MCNC: 116 MG/DL (ref 70–125)
HCT VFR BLD AUTO: 36 % (ref 35–47)
HGB BLD-MCNC: 11.6 G/DL (ref 11.7–15.7)
IMM GRANULOCYTES # BLD: 0 10E3/UL
IMM GRANULOCYTES NFR BLD: 0 %
LYMPHOCYTES # BLD AUTO: 0.9 10E3/UL (ref 0.8–5.3)
LYMPHOCYTES NFR BLD AUTO: 15 %
MCH RBC QN AUTO: 31.3 PG (ref 26.5–33)
MCHC RBC AUTO-ENTMCNC: 32.2 G/DL (ref 31.5–36.5)
MCV RBC AUTO: 97 FL (ref 78–100)
MONOCYTES # BLD AUTO: 0.1 10E3/UL (ref 0–1.3)
MONOCYTES NFR BLD AUTO: 2 %
NEUTROPHILS # BLD AUTO: 5 10E3/UL (ref 1.6–8.3)
NEUTROPHILS NFR BLD AUTO: 83 %
NRBC # BLD AUTO: 0 10E3/UL
NRBC BLD AUTO-RTO: 0 /100
PLATELET # BLD AUTO: 184 10E3/UL (ref 150–450)
POTASSIUM BLD-SCNC: 4 MMOL/L (ref 3.5–5)
PROT SERPL-MCNC: 7.2 G/DL (ref 6–8)
RBC # BLD AUTO: 3.71 10E6/UL (ref 3.8–5.2)
SODIUM SERPL-SCNC: 138 MMOL/L (ref 136–145)
WBC # BLD AUTO: 6.1 10E3/UL (ref 4–11)

## 2021-08-26 PROCEDURE — 85025 COMPLETE CBC W/AUTO DIFF WBC: CPT | Performed by: NURSE PRACTITIONER

## 2021-08-26 PROCEDURE — 99215 OFFICE O/P EST HI 40 MIN: CPT | Performed by: INTERNAL MEDICINE

## 2021-08-26 PROCEDURE — 80053 COMPREHEN METABOLIC PANEL: CPT | Performed by: NURSE PRACTITIONER

## 2021-08-26 PROCEDURE — G0463 HOSPITAL OUTPT CLINIC VISIT: HCPCS | Mod: 25

## 2021-08-26 RX ORDER — HEPARIN SODIUM,PORCINE 10 UNIT/ML
5 VIAL (ML) INTRAVENOUS
Status: CANCELLED | OUTPATIENT
Start: 2021-08-26

## 2021-08-26 RX ORDER — HEPARIN SODIUM (PORCINE) LOCK FLUSH IV SOLN 100 UNIT/ML 100 UNIT/ML
5 SOLUTION INTRAVENOUS
Status: DISCONTINUED | OUTPATIENT
Start: 2021-08-26 | End: 2021-08-26 | Stop reason: HOSPADM

## 2021-08-26 RX ORDER — HEPARIN SODIUM (PORCINE) LOCK FLUSH IV SOLN 100 UNIT/ML 100 UNIT/ML
5 SOLUTION INTRAVENOUS
Status: CANCELLED | OUTPATIENT
Start: 2021-08-26

## 2021-08-26 RX ORDER — DOXYCYCLINE 100 MG/1
100 CAPSULE ORAL 2 TIMES DAILY
Qty: 20 CAPSULE | Refills: 0 | Status: SHIPPED | OUTPATIENT
Start: 2021-08-26 | End: 2021-09-05

## 2021-08-26 ASSESSMENT — PAIN SCALES - GENERAL: PAINLEVEL: EXTREME PAIN (8)

## 2021-08-26 ASSESSMENT — MIFFLIN-ST. JEOR: SCORE: 1158.52

## 2021-08-26 NOTE — LETTER
8/26/2021         RE: Joanne Colon  [address]  [place] [address]        Dear Colleague,    Thank you for referring your patient, Joanne Colon, to the [facility]. Please see a copy of my visit note below.    Oncology Rooming Note    August 26, 2021 8:57 AM   Joanne Colon is a 36 year old female who presents for:    Chief Complaint   Patient presents with     Oncology Clinic Visit     Initial Vitals: /68 (BP Location: Right arm, Cuff Size: Adult Regular)   Pulse 82   Temp 97.9  F (36.6  C) (Oral)   Resp 16   Ht 1.524 m (5')   Wt 55.7 kg (122 lb 12.8 oz)   SpO2 100%   BMI 23.98 kg/m   Estimated body mass index is 23.98 kg/m  as calculated from the following:    Height as of this encounter: 1.524 m (5').    Weight as of this encounter: 55.7 kg (122 lb 12.8 oz). Body surface area is 1.54 meters squared.  Extreme Pain (8) Comment: Data Unavailable   No LMP recorded. (Menstrual status: Chemotherapy).  Allergies reviewed: Yes  Medications reviewed: Yes    Medications: Medication refills not needed today.  Pharmacy name entered into "[employer]: [id number] - [place], [place] - [address]  AT [address]    Clinical concerns: kiko is worsening,     Neha Ventura              [facility] Hematology and Oncology Progress Note    Patient: Joanne Colon  MRN: [id number]  Date of Service: Aug 26, 2021         Reason for Visit    Chief Complaint   Patient presents with     Oncology Clinic Visit       Assessment and Plan    Cancer Staging  Malignant neoplasm of nipple of right breast in female, estrogen receptor positive (H)  Staging form: Breast, AJCC 8th Edition  - Clinical stage from 4/21/2021: Stage IB (cT2, cN1(f), cM0, G3, ER+, IA+, HER2+) - Signed by Chelly Sanchez MD on 8/17/2021      ECOG Performance    0 - Independent     Pain  Pain Score: Extreme Pain (8)  Pain Loc: Other - see comment (kiko greenish)      #.  An open sore over  the sacrum without drainage.   I recommended to start the doxycycline 100 mg twice daily for 10 days.  A referral to wound care was placed to be seen as soon as possible.  I recommended to delay chemotherapy for 1 week to ensure the wound healing is adequate.  She was upset that she could not get last cycle of chemotherapy today, however she voiced understanding of the need to delay.     #.  cT2 N1 M0 invasive ductal carcinoma, grade 3, triple positive.   She is now completed 5 cycles of neoadjuvant chemotherapy.  Good clinical response.     Follow-up in next week with labs prior to last cycle of chemotherapy.   She wishes to proceed with bilateral mastectomy.  I explained to her that survival benefit from lumpectomy with radiation or mastectomy with radiation in her case will be equivalent.  However if she has hereditary breast and ovarian cancer gene mutation, consideration of bilateral mastectomy is indicated.  Referral to genetic was placed today and hopefully we can complete the genetic counseling and possible testing in a timely manner prior to surgery.      #.  Mild to moderate diarrhea, secondary to chemotherapy.   Continue with supportive care and Imodium.  If needed, we can add Lomotil.      Encounter Diagnoses:    Problem List Items Addressed This Visit     None      Visit Diagnoses     Wound of sacral region, subsequent encounter    -  Primary    Relevant Medications    doxycycline hyclate (VIBRAMYCIN) 100 MG capsule    Other Relevant Orders    Wound Care Referral    Malignant neoplasm of upper-inner quadrant of right breast in female, estrogen receptor positive (H)        Relevant Orders    Cancer Risk Mgmt/Cancer Genetic Counseling Referral             CC: Ye Lira MD   ______________________________________________________________________________  Diagnosis  4/2021-presented to the emergency room with a mass in her right breast along with pain for about 3 months or so.  Denies nipple changes  or discharge.  Underwent diagnostic mammogram and ultrasound on 2021 and it confirmed right breast mass in the 8 o'clock position, 5 cm from the nipple of 2.2 x 2.8 x 1.4 cm with abnormal appearing right axillary lymph node of 1.7 cm with cortical thickness.  She underwent ultrasound-guided core needle biopsy on the same day and it showed invasive ductal carcinoma, grade 3, ER moderate to strong positive, VA strongly positive, HER-2 positive by IHC.              - She met with Dr. Perdomo from surgery and recommended to consider neoadjuvant chemotherapy to HER-2 positive lymph node positive disease.     LMP- 5/10/2021     Treatment to date  2021-initiated neoadjuvant TCHP.  Required hospitalization with neutropenic fever, neutropenic colitis after cycle #1.  Neulasta added starting cycle #2 chemotherapy.    History of Present Illness    Ms. Joanne Colon presented today accompanied by her sister.    She reported the drainage in the bottom has decreased although she still have some discomfort.  No fever.  No drainage.  She noted an open sore.  She wants to have a stronger antidiarrheal medication for neck cycle of chemotherapy.    Review of systems  Apart from describing in HPI, the remainder of comprehensive ROS was negative.    Past History    Past Medical History:   Diagnosis Date     Breast cancer (H)        Past Surgical History:   Procedure Laterality Date      SECTION      x two     IR CHEST PORT PLACEMENT > 5 YRS OF AGE  2021     IR PORT PLACEMENT >5 YEARS  2021     US BIOPSY FINE NEEDLE ASPIRATION LYMPH NODE (BREAST) RIGHT Right 2021     US BREAST CORE BIOPSY RIGHT Right 2021         Physical Exam    /68 (BP Location: Right arm, Cuff Size: Adult Regular)   Pulse 82   Temp 97.9  F (36.6  C) (Oral)   Resp 16   Ht 1.524 m (5')   Wt 55.7 kg (122 lb 12.8 oz)   SpO2 100%   BMI 23.98 kg/m        General: alert, awake, not in acute distress  HEENT: Head: Normal,  normocephalic, atraumatic.  Eye: Normal external eye, conjunctiva, lids cornea, LISSETH.  Nose: Normal external nose, mucus membranes and septum.  Pharynx: Normal buccal mucosa. Normal pharynx.  Neck / Thyroid: Supple, no masses, nodes, nodules or enlargement.  Lymphatics: No abnormally enlarged lymph nodes.  Chest: Normal chest wall and respirations. Clear to auscultation.  Heart: S1 S2 RRR, no murmur.   Abdomen: abdomen is soft without significant tenderness, masses, organomegaly or guarding  Extremities: normal strength, tone, and muscle mass  Skin:  CNS: non focal.    Lab Results    Recent Results (from the past 168 hour(s))   Comprehensive metabolic panel   Result Value Ref Range    Sodium 138 136 - 145 mmol/L    Potassium 4.0 3.5 - 5.0 mmol/L    Chloride 103 98 - 107 mmol/L    Carbon Dioxide (CO2) 24 22 - 31 mmol/L    Anion Gap 11 5 - 18 mmol/L    Urea Nitrogen 9 8 - 22 mg/dL    Creatinine 0.63 0.60 - 1.10 mg/dL    Calcium 10.1 8.5 - 10.5 mg/dL    Glucose 116 70 - 125 mg/dL    Alkaline Phosphatase 72 45 - 120 U/L    AST 16 0 - 40 U/L    ALT 16 0 - 45 U/L    Protein Total 7.2 6.0 - 8.0 g/dL    Albumin 4.2 3.5 - 5.0 g/dL    Bilirubin Total 0.4 0.0 - 1.0 mg/dL    GFR Estimate >90 >60 mL/min/1.73m2   CBC with platelets and differential   Result Value Ref Range    WBC Count 6.1 4.0 - 11.0 10e3/uL    RBC Count 3.71 (L) 3.80 - 5.20 10e6/uL    Hemoglobin 11.6 (L) 11.7 - 15.7 g/dL    Hematocrit 36.0 35.0 - 47.0 %    MCV 97 78 - 100 fL    MCH 31.3 26.5 - 33.0 pg    MCHC 32.2 31.5 - 36.5 g/dL    RDW 14.6 10.0 - 15.0 %    Platelet Count 184 150 - 450 10e3/uL    % Neutrophils 83 %    % Lymphocytes 15 %    % Monocytes 2 %    % Eosinophils 0 %    % Basophils 0 %    % Immature Granulocytes 0 %    NRBCs per 100 WBC 0 <1 /100    Absolute Neutrophils 5.0 1.6 - 8.3 10e3/uL    Absolute Lymphocytes 0.9 0.8 - 5.3 10e3/uL    Absolute Monocytes 0.1 0.0 - 1.3 10e3/uL    Absolute Eosinophils 0.0 0.0 - 0.7 10e3/uL    Absolute Basophils 0.0  0.0 - 0.2 10e3/uL    Absolute Immature Granulocytes 0.0 <=0.0 10e3/uL    Absolute NRBCs 0.0 10e3/uL   Extra Red Top Tube   Result Value Ref Range    Hold Specimen JIC        Imaging    No results found.    TT: 45 minutes: time consisted of preparing to see the patient (eg. review of tests)- 5 minutes, obtaining an/or reviewing separately obtained history (25 minutes), ordering medication, test or procedure, referring or communicating with other healthcare professionals, or documenting clinical information in the electronic or other health record (15 minutes).    Signed by: Chelly Sanchez MD      Again, thank you for allowing me to participate in the care of your patient.        Sincerely,        Chelly Sanchez MD

## 2021-08-26 NOTE — PROGRESS NOTES
Oncology Rooming Note    August 26, 2021 8:57 AM   Joanne Colon is a 36 year old female who presents for:    Chief Complaint   Patient presents with     Oncology Clinic Visit     Initial Vitals: /68 (BP Location: Right arm, Cuff Size: Adult Regular)   Pulse 82   Temp 97.9  F (36.6  C) (Oral)   Resp 16   Ht 1.524 m (5')   Wt 55.7 kg (122 lb 12.8 oz)   SpO2 100%   BMI 23.98 kg/m   Estimated body mass index is 23.98 kg/m  as calculated from the following:    Height as of this encounter: 1.524 m (5').    Weight as of this encounter: 55.7 kg (122 lb 12.8 oz). Body surface area is 1.54 meters squared.  Extreme Pain (8) Comment: Data Unavailable   No LMP recorded. (Menstrual status: Chemotherapy).  Allergies reviewed: Yes  Medications reviewed: Yes    Medications: Medication refills not needed today.  Pharmacy name entered into [facility]: [facility] #[id number] [place], [place] - [address]  AT [address]    Clinical concerns: kiko is worsening,     Neha Ventura

## 2021-08-27 ENCOUNTER — TELEPHONE (OUTPATIENT)
Dept: ONCOLOGY | Facility: CLINIC | Age: 36
End: 2021-08-27

## 2021-08-27 NOTE — TELEPHONE ENCOUNTER
Called Joanne with the help of [place]  [id number] . Shared that  has called her a couple times today to assist in advancing her genetic consult appointment. She denies receiving a phone message. Writer attempted to transfer call to Yazmin in scheduling but unable to transfer. With permission of Yazmin, asked  to assist Joanne in calling Yazmin at her direct dial number to assist in scheduling.  agreed to provide assistance and writer relayed contact information/Kira Ham RN

## 2021-08-27 NOTE — TELEPHONE ENCOUNTER
I called Joanne to reschedule her Genetic Counseling appt with Brittney Huggins from 10/14/21 to next week (8/30 or 8/31 in any open return spot) as requested by Brittney Huggins via IB. Joanne did not answer, so a voicemail was left requesting a return call. IB sent to Brittney with status.    1st attempt at 1044am - phone was busy  2nd attempt at 224pm - no answer, voicemail left

## 2021-08-27 NOTE — PROGRESS NOTES
[facility] Hematology and Oncology Progress Note    Patient: Joanne Colon  MRN: [id number]  Date of Service: Aug 26, 2021         Reason for Visit    Chief Complaint   Patient presents with     Oncology Clinic Visit       Assessment and Plan    Cancer Staging  Malignant neoplasm of nipple of right breast in female, estrogen receptor positive (H)  Staging form: Breast, AJCC 8th Edition  - Clinical stage from 4/21/2021: Stage IB (cT2, cN1(f), cM0, G3, ER+, NJ+, HER2+) - Signed by Chelly Sanchez MD on 8/17/2021      ECOG Performance    0 - Independent     Pain  Pain Score: Extreme Pain (8)  Pain Loc: Other - see comment (diarhea greenish)      #.  An open sore over the sacrum without drainage.   I recommended to start the doxycycline 100 mg twice daily for 10 days.  A referral to wound care was placed to be seen as soon as possible.  I recommended to delay chemotherapy for 1 week to ensure the wound healing is adequate.  She was upset that she could not get last cycle of chemotherapy today, however she voiced understanding of the need to delay.     #.  cT2 N1 M0 invasive ductal carcinoma, grade 3, triple positive.   She is now completed 5 cycles of neoadjuvant chemotherapy.  Good clinical response.     Follow-up in next week with labs prior to last cycle of chemotherapy.   She wishes to proceed with bilateral mastectomy.  I explained to her that survival benefit from lumpectomy with radiation or mastectomy with radiation in her case will be equivalent.  However if she has hereditary breast and ovarian cancer gene mutation, consideration of bilateral mastectomy is indicated.  Referral to genetic was placed today and hopefully we can complete the genetic counseling and possible testing in a timely manner prior to surgery.      #.  Mild to moderate diarrhea, secondary to chemotherapy.   Continue with supportive care and Imodium.  If needed, we can add Lomotil.      Encounter Diagnoses:    Problem List Items  Addressed This Visit     None      Visit Diagnoses     Wound of sacral region, subsequent encounter    -  Primary    Relevant Medications    doxycycline hyclate (VIBRAMYCIN) 100 MG capsule    Other Relevant Orders    Wound Care Referral    Malignant neoplasm of upper-inner quadrant of right breast in female, estrogen receptor positive (H)        Relevant Orders    Cancer Risk Mgmt/Cancer Genetic Counseling Referral             CC: Ye Lira MD   ______________________________________________________________________________  Diagnosis  4/2021-presented to the emergency room with a mass in her right breast along with pain for about 3 months or so.  Denies nipple changes or discharge.  Underwent diagnostic mammogram and ultrasound on 4/13/2021 and it confirmed right breast mass in the 8 o'clock position, 5 cm from the nipple of 2.2 x 2.8 x 1.4 cm with abnormal appearing right axillary lymph node of 1.7 cm with cortical thickness.  She underwent ultrasound-guided core needle biopsy on the same day and it showed invasive ductal carcinoma, grade 3, ER moderate to strong positive, ND strongly positive, HER-2 positive by IHC.              - She met with Dr. Perdomo from surgery and recommended to consider neoadjuvant chemotherapy to HER-2 positive lymph node positive disease.     LMP- 5/10/2021     Treatment to date  5/13/2021-initiated neoadjuvant TCHP.  Required hospitalization with neutropenic fever, neutropenic colitis after cycle #1.  Neulasta added starting cycle #2 chemotherapy.    History of Present Illness    Ms. Joanne Colon presented today accompanied by her sister.    She reported the drainage in the bottom has decreased although she still have some discomfort.  No fever.  No drainage.  She noted an open sore.  She wants to have a stronger antidiarrheal medication for neck cycle of chemotherapy.    Review of systems  Apart from describing in HPI, the remainder of comprehensive ROS was negative.    Past  History    Past Medical History:   Diagnosis Date     Breast cancer (H)        Past Surgical History:   Procedure Laterality Date      SECTION      x two     IR CHEST PORT PLACEMENT > 5 YRS OF AGE  2021     IR PORT PLACEMENT >5 YEARS  2021     US BIOPSY FINE NEEDLE ASPIRATION LYMPH NODE (BREAST) RIGHT Right 2021     US BREAST CORE BIOPSY RIGHT Right 2021         Physical Exam    /68 (BP Location: Right arm, Cuff Size: Adult Regular)   Pulse 82   Temp 97.9  F (36.6  C) (Oral)   Resp 16   Ht 1.524 m (5')   Wt 55.7 kg (122 lb 12.8 oz)   SpO2 100%   BMI 23.98 kg/m        General: alert, awake, not in acute distress  HEENT: Head: Normal, normocephalic, atraumatic.  Eye: Normal external eye, conjunctiva, lids cornea, LISSETH.  Nose: Normal external nose, mucus membranes and septum.  Pharynx: Normal buccal mucosa. Normal pharynx.  Neck / Thyroid: Supple, no masses, nodes, nodules or enlargement.  Lymphatics: No abnormally enlarged lymph nodes.  Chest: Normal chest wall and respirations. Clear to auscultation.  Heart: S1 S2 RRR, no murmur.   Abdomen: abdomen is soft without significant tenderness, masses, organomegaly or guarding  Extremities: normal strength, tone, and muscle mass  Skin:  CNS: non focal.    Lab Results    Recent Results (from the past 168 hour(s))   Comprehensive metabolic panel   Result Value Ref Range    Sodium 138 136 - 145 mmol/L    Potassium 4.0 3.5 - 5.0 mmol/L    Chloride 103 98 - 107 mmol/L    Carbon Dioxide (CO2) 24 22 - 31 mmol/L    Anion Gap 11 5 - 18 mmol/L    Urea Nitrogen 9 8 - 22 mg/dL    Creatinine 0.63 0.60 - 1.10 mg/dL    Calcium 10.1 8.5 - 10.5 mg/dL    Glucose 116 70 - 125 mg/dL    Alkaline Phosphatase 72 45 - 120 U/L    AST 16 0 - 40 U/L    ALT 16 0 - 45 U/L    Protein Total 7.2 6.0 - 8.0 g/dL    Albumin 4.2 3.5 - 5.0 g/dL    Bilirubin Total 0.4 0.0 - 1.0 mg/dL    GFR Estimate >90 >60 mL/min/1.73m2   CBC with platelets and differential   Result  Value Ref Range    WBC Count 6.1 4.0 - 11.0 10e3/uL    RBC Count 3.71 (L) 3.80 - 5.20 10e6/uL    Hemoglobin 11.6 (L) 11.7 - 15.7 g/dL    Hematocrit 36.0 35.0 - 47.0 %    MCV 97 78 - 100 fL    MCH 31.3 26.5 - 33.0 pg    MCHC 32.2 31.5 - 36.5 g/dL    RDW 14.6 10.0 - 15.0 %    Platelet Count 184 150 - 450 10e3/uL    % Neutrophils 83 %    % Lymphocytes 15 %    % Monocytes 2 %    % Eosinophils 0 %    % Basophils 0 %    % Immature Granulocytes 0 %    NRBCs per 100 WBC 0 <1 /100    Absolute Neutrophils 5.0 1.6 - 8.3 10e3/uL    Absolute Lymphocytes 0.9 0.8 - 5.3 10e3/uL    Absolute Monocytes 0.1 0.0 - 1.3 10e3/uL    Absolute Eosinophils 0.0 0.0 - 0.7 10e3/uL    Absolute Basophils 0.0 0.0 - 0.2 10e3/uL    Absolute Immature Granulocytes 0.0 <=0.0 10e3/uL    Absolute NRBCs 0.0 10e3/uL   Extra Red Top Tube   Result Value Ref Range    Hold Specimen JIC        Imaging    No results found.    TT: 45 minutes: time consisted of preparing to see the patient (eg. review of tests)- 5 minutes, obtaining an/or reviewing separately obtained history (25 minutes), ordering medication, test or procedure, referring or communicating with other healthcare professionals, or documenting clinical information in the electronic or other health record (15 minutes).    Signed by: Chelly Sanchez MD

## 2021-08-30 ENCOUNTER — VIRTUAL VISIT (OUTPATIENT)
Dept: ONCOLOGY | Facility: CLINIC | Age: 36
End: 2021-08-30
Attending: INTERNAL MEDICINE
Payer: COMMERCIAL

## 2021-08-30 ENCOUNTER — TELEPHONE (OUTPATIENT)
Dept: VASCULAR SURGERY | Facility: CLINIC | Age: 36
End: 2021-08-30

## 2021-08-30 ENCOUNTER — OFFICE VISIT (OUTPATIENT)
Dept: VASCULAR SURGERY | Facility: CLINIC | Age: 36
End: 2021-08-30
Attending: INTERNAL MEDICINE
Payer: COMMERCIAL

## 2021-08-30 VITALS
BODY MASS INDEX: 24.41 KG/M2 | HEART RATE: 88 BPM | WEIGHT: 125 LBS | TEMPERATURE: 97.8 F | DIASTOLIC BLOOD PRESSURE: 70 MMHG | SYSTOLIC BLOOD PRESSURE: 108 MMHG | RESPIRATION RATE: 16 BRPM

## 2021-08-30 DIAGNOSIS — S31.000D WOUND OF SACRAL REGION, SUBSEQUENT ENCOUNTER: Primary | ICD-10-CM

## 2021-08-30 DIAGNOSIS — K60.2 ANAL FISSURE: Primary | ICD-10-CM

## 2021-08-30 DIAGNOSIS — C50.211 MALIGNANT NEOPLASM OF UPPER-INNER QUADRANT OF RIGHT BREAST IN FEMALE, ESTROGEN RECEPTOR POSITIVE (H): Primary | ICD-10-CM

## 2021-08-30 DIAGNOSIS — S31.000D WOUND OF SACRAL REGION, SUBSEQUENT ENCOUNTER: ICD-10-CM

## 2021-08-30 DIAGNOSIS — Z17.0 MALIGNANT NEOPLASM OF UPPER-INNER QUADRANT OF RIGHT BREAST IN FEMALE, ESTROGEN RECEPTOR POSITIVE (H): Primary | ICD-10-CM

## 2021-08-30 DIAGNOSIS — Z80.3 FAMILY HISTORY OF MALIGNANT NEOPLASM OF BREAST: ICD-10-CM

## 2021-08-30 PROCEDURE — 99213 OFFICE O/P EST LOW 20 MIN: CPT | Performed by: NURSE PRACTITIONER

## 2021-08-30 PROCEDURE — G0463 HOSPITAL OUTPT CLINIC VISIT: HCPCS

## 2021-08-30 PROCEDURE — 96040 HC GENETIC COUNSELING, EACH 30 MINUTES: CPT | Mod: TEL | Performed by: GENETIC COUNSELOR, MS

## 2021-08-30 PROCEDURE — 250N000009 HC RX 250: Performed by: NURSE PRACTITIONER

## 2021-08-30 RX ORDER — LIDOCAINE HYDROCHLORIDE 20 MG/ML
JELLY TOPICAL DAILY PRN
Status: ACTIVE | OUTPATIENT
Start: 2021-08-30

## 2021-08-30 RX ORDER — GENTAMICIN SULFATE 1 MG/G
OINTMENT TOPICAL 3 TIMES DAILY
Qty: 15 G | Refills: 0 | Status: SHIPPED | OUTPATIENT
Start: 2021-08-30 | End: 2021-11-11

## 2021-08-30 RX ORDER — GENTAMICIN SULFATE 1 MG/G
OINTMENT TOPICAL 3 TIMES DAILY
Qty: 30 G | Refills: 1 | Status: SHIPPED | OUTPATIENT
Start: 2021-08-30 | End: 2021-11-11

## 2021-08-30 RX ADMIN — LIDOCAINE HYDROCHLORIDE: 20 JELLY TOPICAL at 14:14

## 2021-08-30 ASSESSMENT — PAIN SCALES - GENERAL: PAINLEVEL: EXTREME PAIN (9)

## 2021-08-30 NOTE — PROGRESS NOTES
[facility]ascular Clinic Consult Note    Date of Service:   8/30/2021     Requesting Provider: Dr. Sanchez; oncology    Chief Complaint: buttock ulcer    History of Present Illness: Joanne Colon is being seen at [facility] today regarding buttock ulcer. They arrive to the clinic today with sister who is assisting with interpreting today; they declined an . The patient reports that she was diagnosed with breast cancer 6 months ago; she is undergoing chemotherapy/radiation; and will expect to have surgery after chemotherapy is completed. She developed severe diarrhea from chemotherapy; and then developed a wound near the rectum 4 weeks ago. Was currently/previously using neosporin, lidocaine cream; no dressings; reporting green drainage. Reports pain of 9/10 in the wound; walking makes this worse; walking steps; and using bathroom makes the pain worse; this is intermittant; occasionally the pain will subside all together; currently using apap for pain. Was prescribed doxycyline; causing some stomach upset otherwise tolerating well; denies culture on the wound.  Denies any fevers, chills, or generalized ill feeling. + history of cancer breast. Sleeps in a bed/recliner with legs elevated prefers to sleep on her sides. I personally reviewed outside imaging, lab work, and progress noted through Care Everywhere and outside records.        Review of Systems:   Constitutional:  Negative   EENTM: negative for glasses;  negative [place]  GI:  negative for nausea/vomiting;   negative for constipation   negative diarrhea this has resolved;   negative for fecal incontinence  negative weight loss  :   negative dysuria,  negative incontinence  MS: patient is ambulatory;  does not use assistive devices  Cardiac: negative   Respiratory:  negative SOB  Endocrine:  negative  diabetes  Psych: negative  depression/anxiety    Past Medical History:    Past Medical History:   Diagnosis Date     Breast cancer  (H)      Breast mass, right 2021    Dx 2021      Chemotherapy induced nausea and vomiting 2021     Chemotherapy-induced neutropenia (H)      Colitis, acute      Encounter for antineoplastic chemotherapy 2021     Fever and chills 2021     Functional diarrhea 2021     Hepatitis      Lesion of liver less than 1 cm in diameter      Malignant neoplasm of nipple of right breast in female, estrogen receptor positive (H) 2021     Mucositis due to chemotherapy      Neutropenic fever (H)      Neutropenic sepsis (H) 2021       Surgical History:   Past Surgical History:   Procedure Laterality Date      SECTION      x two     IR CHEST PORT PLACEMENT > 5 YRS OF AGE  2021     IR PORT PLACEMENT >5 YEARS  2021     US BIOPSY FINE NEEDLE ASPIRATION LYMPH NODE (BREAST) RIGHT Right 2021     US BREAST CORE BIOPSY RIGHT Right 2021        Medications:   Current Outpatient Medications   Medication Sig     acetaminophen (TYLENOL) 500 MG tablet Take 500 mg by mouth every 6 hours as needed for mild pain     calcium carbonate (TUMS) 500 MG chewable tablet Take 1 chew tab by mouth 2 times daily as needed for heartburn     dexamethasone (DECADRON) 4 MG tablet Take 4 mg by mouth 2 times daily (with meals) Taking 8mg every 12hours for 3 days starting the day before chemo     doxycycline hyclate (VIBRAMYCIN) 100 MG capsule Take 1 capsule (100 mg) by mouth 2 times daily for 10 days     gentamicin (GARAMYCIN) 0.1 % external ointment Apply topically 3 times daily Apply 0.25gram to buttocks wound 3 times per day for 14 days     loperamide (IMODIUM) 2 MG capsule Take 2 mg by mouth 4 times daily as needed for diarrhea     magic mouthwash suspension (diphenhydrAMINE, lidocaine, aluminum-magnesium & simethicone) Swish and swallow 10 mLs in mouth every 6 hours as needed for mouth sores      ondansetron (ZOFRAN) 4 MG tablet Take 1 tablet (4 mg) by mouth every 8 hours as needed for nausea      prochlorperazine (COMPAZINE) 10 MG tablet Take 10 mg by mouth     study - lidocaine/prilocaine, IDS# 5747, 2.5-2.5 % external cream Apply topically as needed for moderate pain Apply topically as directed prior to blood draw.      No current facility-administered medications for this visit.       Allergies: No Known Allergies    Family history:   Family History   Problem Relation Age of Onset     Diabetes Type 2  Mother      Diabetes Type 2  Father      Breast Cancer No family hx of         Social History:   Social History     Socioeconomic History     Marital status:      Spouse name: Not on file     Number of children: 2     Years of education: Not on file     Highest education level: Not on file   Occupational History     Not on file   Tobacco Use     Smoking status: Never Smoker     Smokeless tobacco: Never Used   Substance and Sexual Activity     Alcohol use: Not Currently     Drug use: Not Currently     Sexual activity: Not Currently   Other Topics Concern     Not on file   Social History Narrative    Diet-    Exercise-    [place]     Social Determinants of Health     Financial Resource Strain:      Difficulty of Paying Living Expenses:    Food Insecurity:      Worried About Running Out of Food in the Last Year:      Ran Out of Food in the Last Year:    Transportation Needs:      Lack of Transportation (Medical):      Lack of Transportation (Non-Medical):    Physical Activity:      Days of Exercise per Week:      Minutes of Exercise per Session:    Stress:      Feeling of Stress :    Social Connections:      Frequency of Communication with Friends and Family:      Frequency of Social Gatherings with Friends and Family:      Attends Faith Services:      Active Member of Clubs or Organizations:      Attends Club or Organization Meetings:      Marital Status:    Intimate Partner Violence:      Fear of Current or Ex-Partner:      Emotionally Abused:      Physically Abused:      Sexually Abused:          Physical Exam  Vitals: /70   Pulse 88   Temp 97.8  F (36.6  C)   Resp 16   Wt 125 lb (56.7 kg)   BMI 24.41 kg/m    Weight is 125 lbs 0 oz          Body mass index is 24.41 kg/m .  General: This is a 36 year old female who appears their reported age, not in acute distress  Head: normocephalic, Atraumatic; not wearing glasses; non-icteric sclera; no exudate; no hearing loss   Respiratory: Clear throughout all lung fields; unlabored breathing; no cough   Cardiac: Regular, Rate and Rhythm, no murmurs appreciated   Skin: Uniformly warm and dry  Psych: Alert and oriented x4; calm and cooperative throughout exam  Buttocks: at 12 o'clock from the rectal opening there is a 1x0.5x0.1cm full thickness ulcer with scant green yellow drainage; this did not probe or track; did not connect with the rectum; no surrounding erythema; induration or crepitus; very tender to palpation        Circumferential volume measures:      No flowsheet data found.    Ulceration(s)/Wound(s):     Port A Cath Single 04/28/21 Left Chest wall (Active)   Site Assessment WDL 08/26/21 0845   Dressing Status clean;intact;dry 08/26/21 0945   Dressing Intervention Transparent;New dressing 08/26/21 0945   Line Status Blood return noted;Heparin locked 08/26/21 0945   Access Date 08/26/21 08/26/21 0845   Access Attempts 1 08/26/21 0845   Gauge Noncoring 90 degree bend;20 gauge;3/4 inch 08/26/21 0845   De-Access Date 08/05/21 08/05/21 1341   Line Necessity Yes, meets criteria 08/26/21 0845   Number of days: 124       VASC Wound sacrum (Active)   Pre Size Length 1 08/30/21 1300   Pre Size Width 0.5 08/30/21 1300   Pre Size Depth 0.1 08/30/21 1300   Pre Total Sq cm 0.5 08/30/21 1300   Number of days: 0        Laboratory studies:     I personally reviewed the following lab results today and those on care everywhere, if indicated     CRP   Date Value Ref Range Status   04/01/2021 <0.1 0.0 - 0.8 mg/dL Final      No results found for: SED   Last Renal  Panel:  Sodium   Date Value Ref Range Status   08/26/2021 138 136 - 145 mmol/L Final     Potassium   Date Value Ref Range Status   08/26/2021 4.0 3.5 - 5.0 mmol/L Final     Chloride   Date Value Ref Range Status   08/26/2021 103 98 - 107 mmol/L Final     Carbon Dioxide (CO2)   Date Value Ref Range Status   08/26/2021 24 22 - 31 mmol/L Final     Anion Gap   Date Value Ref Range Status   08/26/2021 11 5 - 18 mmol/L Final     Glucose   Date Value Ref Range Status   08/26/2021 116 70 - 125 mg/dL Final     Urea Nitrogen   Date Value Ref Range Status   08/26/2021 9 8 - 22 mg/dL Final     Creatinine   Date Value Ref Range Status   08/26/2021 0.63 0.60 - 1.10 mg/dL Final     GFR Estimate   Date Value Ref Range Status   08/26/2021 >90 >60 mL/min/1.73m2 Final     Comment:     As of July 11, 2021, eGFR is calculated by the CKD-EPI creatinine equation, without race adjustment. eGFR can be influenced by muscle mass, exercise, and diet. The reported eGFR is an estimation only and is only applicable if the renal function is stable.   06/24/2021 >60 >60 mL/min/1.73m2 Final     Calcium   Date Value Ref Range Status   08/26/2021 10.1 8.5 - 10.5 mg/dL Final     Albumin   Date Value Ref Range Status   08/26/2021 4.2 3.5 - 5.0 g/dL Final      Lab Results   Component Value Date    WBC 6.1 08/26/2021     Lab Results   Component Value Date    RBC 3.71 08/26/2021     Lab Results   Component Value Date    HGB 11.6 08/26/2021     Lab Results   Component Value Date    HCT 36.0 08/26/2021     No components found for: MCT  Lab Results   Component Value Date    MCV 97 08/26/2021     Lab Results   Component Value Date    MCH 31.3 08/26/2021     Lab Results   Component Value Date    MCHC 32.2 08/26/2021     Lab Results   Component Value Date    RDW 14.6 08/26/2021     Lab Results   Component Value Date     08/26/2021      No results found for: A1C   No results found for: TSH   No results found for: VITDT             Impression:   Encounter  Diagnoses   Name Primary?     Anal fissure Yes     Wound of sacral region, subsequent encounter            Assessment/Plan:  1. Debridement:na    2. Treatment: wound treatment will include irrigation and dressings to promote autolytic debridement which will include: due to the green drainage; will treat with x2 weeks of topical gentamycin ointment; apply 3 times a day; suspicious for pseudomonas; did not probe or track today; low suspicion for abscess or fistula    3. Edema. na    4. Offloading: not located in pressure loading area; sitting does not cause discomfort for her    5. Nutrition: na    Patient to return to clinic in 2 week(s) for re-evaluation. They were instructed to call the clinic sooner with any further questions or concerns. Answered all questions.              Alee Levine DNP, RN, CNP, CWOCN  [facility]  [phone]      This note was electronically signed by Alee Levine NP

## 2021-08-30 NOTE — LETTER
8/30/2021         RE: Joanne Colon  [address] [place] [address]        Dear Colleague,    Thank you for referring your patient, Joanne Colon, to the [facility]. Please see a copy of my visit note below.    8/30/2021    Joanne is a 36 year old who is being evaluated via a billable telephone visit.      Presenting Information:   I met with Joanne Colon today for genetic counseling (telephone visit due to covid19) to discuss her personal and family history of breast cancer.  She is here today to review this history, cancer screening recommendations, and available genetic testing options.    Personal History:  Joanne is a 36 year old female. She was recently diagnosed with a right breast cancer. Biopsy on 4//13/2021 revealed an invasive ductal carcinoma, grade 3, ER positive, NE positive, and HER2 positive. She is following with Dr. Sanchez and has completed 5 cycles of neoadjuvant chemotherapy (TC/HP). She is nearing the end of her neoadjuvant chemo and is leaning towards bilateral mastectomies, however, the results from genetic testing will help determine whether bilateral mastectomy is the right surgical option for her.     She had her first menstrual period at age 14 (10th grade), her first child at age 23, and is premenopausal.  Joanne has her ovaries, fallopian tubes and uterus in place, and she has had no ovarian cancer screening to date.  She reports no history of oral contraceptive use and that she has never been on hormone replacement therapy.      She has never had a colonoscopy given her age. She does not regularly do any other cancer screening at this time.  Joanne reported no tobacco use, and no alcohol use.    Family History: Cancer family history and pertinent information reviewed below (Please see scanned pedigree for detailed family history information)    One maternal aunt in her mid to late 70's with a history of breast cancer diagnosed around age 70.     Paternal  first-cousin, age 25, with a history of a leukemia diagnosed at age 3.       Her maternal and paternal ethnicity is [place]. There is no known Ashkenazi Confucianism ancestry on either side of her family. There is no reported consanguinity.    Discussion:    Joanne's personal and family history of breast cancer is suggestive of a hereditary cancer syndrome.    We reviewed the features of sporadic, familial, and hereditary cancers. In looking at Joanne's family history, it is possible that a cancer susceptibility gene is present due to her early-onset breast cancer history as well has her maternal aunt's breast cancer history.    We discussed the natural history and genetics of hereditary cancers. A detailed handout regarding the information we discussed will be sent to Joanne in [place] mail. Topics included: inheritance pattern, cancer risks, cancer screening recommendations, and also risks, benefits and limitations of testing.  We reviewed that the most common cause of hereditary breast cancer is Hereditary Breast and Ovarian Cancer (HBOC) syndrome, which is caused by mutations in the genes BRCA1 and BRCA2. Women with a mutation in either of these genes are at increased risk for breast and ovarian cancer. There is also an increased risk for a second primary breast cancer for women. Men with a mutation in either BRCA1 or BRCA2 are at increased risk for male breast and prostate cancer. Both women and men may also be at increased risk for pancreatic cancer and melanoma.     Based on her personal and family history, Joanne meets current National Comprehensive Cancer Network (NCCN) criteria for genetic testing of high-penetrance breast cancer susceptibility genes.      We discussed that there are additional genes that could cause increased risk for breast cancer. As many of these genes present with overlapping features in a family and accurate cancer risk cannot always be established based upon the pedigree analysis alone, it  would be reasonable for Joanne to consider panel genetic testing to analyze multiple genes at once.    We discussed genetic testing options for Joanne given her personal and family history including the BRCA1/2 and additional genes associated with increased risk for breast cancer ([facility] Breast and Gyn Cancers Panel; 23 genes). After our discussion, Joanne opted to proceed with genetic testing via the Breast and Gyn Cancers panel through [facility].   Genetic testing is available for 23 genes associated with hereditary gynecologic, breast, and related cancers: [facility]'s Breast and Gyn Cancers panel (CARLI, BARD1, BRCA1, BRCA2, BRIP1, CDH1, CHEK2, DICER1, EPCAM, MLH1, MSH2, MSH6, NBN, NF1, PALB2, PMS2, PTEN, RAD50, RAD51C, RAD51D, SMARCA4, STK11, TP53).  We discussed that some of the genes in the Breast and Gyn Cancers panel are associated with specific hereditary cancer syndromes and published management guidelines: Hereditary Breast and Ovarian Cancer syndrome (BRCA1, BRCA2), Mike syndrome (MLH1, MSH2, MSH6, PMS2, EPCAM), Hereditary Diffuse Gastric Cancer (CDH1), Cowden syndrome (PTEN), Li Fraumeni syndrome (TP53), Peutz-Jeghers syndrome (STK11), and Neurofibromatosis type 1 (NF1).    Risk-reducing salpingo-oophorectomy can be considered in women with mutations in BRIP1, RAD51C, or RAD51D. Breast and/or other cancer risk management guidelines are available for CARLI, CHEK2, PALB2, NF1, and NBN.  The remaining genes (BARD1, DICER1, RAD50, and SMARCA4) are associated with increased cancer risk and may allow us to make medical recommendations when mutations are identified.       Consent was obtained over the phone with no witness required due to the current covid19 global pandemic.    Medical Management: For Joanne, we reviewed that the information from genetic testing may determine:    surgery to treat Joanne's active cancer diagnosis (i.e. lumpectomy versus bilateral mastectomy, partial versus total colectomy,  etc.),    additional cancer screening for which Joanne may qualify (i.e. mammogram and breast MRI, more frequent colonoscopies, more frequent dermatologic exams, etc.),    options for risk reducing surgeries Joanne could consider (i.e. bilateral mastectomy, surgery to remove her ovaries and/or uterus, etc.),      and targeted chemotherapies for Joanne's active cancer, or if she were to develop certain cancers in the future (i.e. immunotherapy for individuals with Mike syndrome, PARP inhibitors, etc.).     These recommendations and possible targeted chemotherapies will be discussed in detail once genetic testing is completed.    Joanne will have her blood drawn for genetic testing at her upcoming infusion appointment on 9/2 at [facility].    We discussed that Joanne's surgical decisions are pending genetic testing results. For that reason, I will place a STAT request on the genes that may impact surgical decisions (CARLI, BRCA1, BRCA2, CDH1, CHEK2, PALB2, PTEN, STK11, and TP53) in order to get these results back as soon as possible.      Plan:  1) Joanne opted to proceed with genetic testing via the Breat and Gyn Cancers panel (23 genes) through [facility].   2) A STAT request was placed on the BRCAPlus genes (including BRCA1 and BRCA2) and we will get these results back in 7-10 days. I will call Joanne with these results as soon as they are available.       Brittney Huggins MS, [facility] [place]  Licensed, Certified Genetic Counselor  [facility]  [phone]  [email or web address]

## 2021-08-30 NOTE — TELEPHONE ENCOUNTER
Central Prior Authorization Team  Phone: [phone]    PA Initiation    Medication: gentamicin (GARAMYCIN) 0.1 % external ointment  Insurance Company: [employer] - Phone [phone] Fax [phone]  Pharmacy Filling the Rx: [id number] [place], [place] - [facility] [facility] [place] [facility]  Filling Pharmacy Phone: [phone]  Filling Pharmacy Fax:    Start Date: 8/30/2021

## 2021-08-30 NOTE — TELEPHONE ENCOUNTER
Please provide the name of the medication that needs a pa and route encounter back to the pa team.

## 2021-08-30 NOTE — LETTER
Cancer Risk Management  Program Locations    [facility]  [facility]  [facility]  [facility]  Mailing Address  Cancer Risk Management Program  [address]  [place] [address]    New patient appointments  [phone]  August 30, 2021    Joanne Colon  [address]   [facility] [address]      Dear Joanne,    It was a pleasure speaking with you on the phone on 8/30/2021.  Here is a copy of the progress note from our discussion. If you have any additional questions, please feel free to call.    Referring Provider: Chelly Sanchez    Present for Today's Visit: Joanne, telephone [place]  #[phone]    Presenting Information:   I met with Donnellia Isaiah today for genetic counseling (telephone visit due to covid19) to discuss her personal and family history of breast cancer.  She is here today to review this history, cancer screening recommendations, and available genetic testing options.    Personal History:  Joanne is a 36 year old female. She was recently diagnosed with a right breast cancer. Biopsy on 4//13/2021 revealed an invasive ductal carcinoma, grade 3, ER positive, RI positive, and HER2 positive. She is following with Dr. Sanchez and has completed 5 cycles of neoadjuvant chemotherapy (TC/HP). She is nearing the end of her neoadjuvant chemo and is leaning towards bilateral mastectomies, however, the results from genetic testing will help determine whether bilateral mastectomy is the right surgical option for her.     She had her first menstrual period at age 14 (10th grade), her first child at age 23, and is premenopausal.  Joanne has her ovaries, fallopian tubes and uterus in place, and she has had no ovarian cancer screening to date.  She reports no history of oral contraceptive use and that she has never been on hormone replacement  therapy.      She has never had a colonoscopy given her age. She does not regularly do any other cancer screening at this time.  Joanne reported no tobacco use, and no alcohol use.    Family History: Cancer family history and pertinent information reviewed below (Please see scanned pedigree for detailed family history information)    One maternal aunt in her mid to late 70's with a history of breast cancer diagnosed around age 70.     Paternal first-cousin, age 25, with a history of a leukemia diagnosed at age 3.       Her maternal and paternal ethnicity is [place]. There is no known Ashkenazi Hoahaoism ancestry on either side of her family. There is no reported consanguinity.    Discussion:    Joanne's personal and family history of breast cancer is suggestive of a hereditary cancer syndrome.    We reviewed the features of sporadic, familial, and hereditary cancers. In looking at Joanne's family history, it is possible that a cancer susceptibility gene is present due to her early-onset breast cancer history as well has her maternal aunt's breast cancer history.    We discussed the natural history and genetics of hereditary cancers. A detailed handout regarding the information we discussed will be sent to Joanne in US mail. Topics included: inheritance pattern, cancer risks, cancer screening recommendations, and also risks, benefits and limitations of testing.  We reviewed that the most common cause of hereditary breast cancer is Hereditary Breast and Ovarian Cancer (HBOC) syndrome, which is caused by mutations in the genes BRCA1 and BRCA2. Women with a mutation in either of these genes are at increased risk for breast and ovarian cancer. There is also an increased risk for a second primary breast cancer for women. Men with a mutation in either BRCA1 or BRCA2 are at increased risk for male breast and prostate cancer. Both women and men may also be at increased risk for pancreatic cancer and melanoma.     Based on her  personal and family history, Joanne meets current National Comprehensive Cancer Network (NCCN) criteria for genetic testing of high-penetrance breast cancer susceptibility genes.      We discussed that there are additional genes that could cause increased risk for breast cancer. As many of these genes present with overlapping features in a family and accurate cancer risk cannot always be established based upon the pedigree analysis alone, it would be reasonable for Joanne to consider panel genetic testing to analyze multiple genes at once.    We discussed genetic testing options for Joanne given her personal and family history including the BRCA1/2 and additional genes associated with increased risk for breast cancer ([facility] Breast and Gyn Cancers Panel; 23 genes). After our discussion, Joanne opted to proceed with genetic testing via the Breast and Gyn Cancers panel through [facility].   Genetic testing is available for 23 genes associated with hereditary gynecologic, breast, and related cancers: [facility]'s Breast and Gyn Cancers panel (CARLI, BARD1, BRCA1, BRCA2, BRIP1, CDH1, CHEK2, DICER1, EPCAM, MLH1, MSH2, MSH6, NBN, NF1, PALB2, PMS2, PTEN, RAD50, RAD51C, RAD51D, SMARCA4, STK11, TP53).  We discussed that some of the genes in the Breast and Gyn Cancers panel are associated with specific hereditary cancer syndromes and published management guidelines: Hereditary Breast and Ovarian Cancer syndrome (BRCA1, BRCA2), Mike syndrome (MLH1, MSH2, MSH6, PMS2, EPCAM), Hereditary Diffuse Gastric Cancer (CDH1), Cowden syndrome (PTEN), Li Fraumeni syndrome (TP53), Peutz-Jeghers syndrome (STK11), and Neurofibromatosis type 1 (NF1).    Risk-reducing salpingo-oophorectomy can be considered in women with mutations in BRIP1, RAD51C, or RAD51D. Breast and/or other cancer risk management guidelines are available for CARLI, CHEK2, PALB2, NF1, and NBN.  The remaining genes (BARD1, DICER1, RAD50, and SMARCA4) are associated with increased cancer  risk and may allow us to make medical recommendations when mutations are identified.       Consent was obtained over the phone with no witness required due to the current covid19 global pandemic.    Medical Management: For Joanne, we reviewed that the information from genetic testing may determine:    surgery to treat Joanne's active cancer diagnosis (i.e. lumpectomy versus bilateral mastectomy, partial versus total colectomy, etc.),    additional cancer screening for which Joanne may qualify (i.e. mammogram and breast MRI, more frequent colonoscopies, more frequent dermatologic exams, etc.),    options for risk reducing surgeries Joanne could consider (i.e. bilateral mastectomy, surgery to remove her ovaries and/or uterus, etc.),      and targeted chemotherapies for Joanne's active cancer, or if she were to develop certain cancers in the future (i.e. immunotherapy for individuals with Mike syndrome, PARP inhibitors, etc.).     These recommendations and possible targeted chemotherapies will be discussed in detail once genetic testing is completed.    Joanne will have her blood drawn for genetic testing at her upcoming infusion appointment on 9/2 at [facility].    We discussed that Joanne's surgical decisions are pending genetic testing results. For that reason, I will place a STAT request on the genes that may impact surgical decisions (CARLI, BRCA1, BRCA2, CDH1, CHEK2, PALB2, PTEN, STK11, and TP53) in order to get these results back as soon as possible.      Plan:  1) Joanne opted to proceed with genetic testing via the Breat and Gyn Cancers panel (23 genes) through [facility].   2) A STAT request was placed on the BRCAPlus genes (including BRCA1 and BRCA2) and we will get these results back in 7-10 days. I will call Joanne with these results as soon as they are available.     Brittney Huggins MS, CGC  Licensed, Certified Genetic Counselor  [facility]  [facility]  [phone]  [email or web address]              Assessing Cancer Risk  Only about 5-10% of cancers are thought to be due to an inherited cancer susceptibility gene.    These families often have:    Several people with the same or related types of cancer    Cancers diagnosed at a young age (before age 50)    Individuals with more than one primary cancer    Multiple generations of the family affected with cancer    Some people may be candidates for genetic testing of more than one gene.  For these families, genetic testing using a cancer panel may be offered.  These panels will test different genes known to increase the risk for breast, ovarian, uterine, and/or other cancers. All of the genes discussed below have published clinical management guidelines for individuals who are found to carry a mutation. The purpose of this handout is to serve as a brief summary of the genes analyzed by the panels used to inquire about hereditary breast and gynecologic cancer:  CARLI, BRCA1, BRCA2, BRIP1, CDH1, CHEK2, MLH1, MSH2, MSH6, PMS2, EPCAM, PTEN, PALB2, RAD51C, RAD51D, and TP53.  ______________________________________________________________________________  Hereditary Breast and Ovarian Cancer Syndrome   (BRCA1 and BRCA2)  A single mutation in one of the copies of BRCA1 or BRCA2 increases the risk for breast and ovarian cancer, among others.  The risk for pancreatic cancer and melanoma may also be slightly increased in some families.  The chart below shows the chance that someone with a BRCA mutation would develop cancer in his or her lifetime1,2,3,4.        A person s ethnic background is also important to consider, as individuals of Ashkenazi Anabaptist ancestry have a higher chance of having a BRCA gene mutation.  There are three BRCA mutations that occur more frequently in this population.    Mike Syndrome   (MLH1, MSH2, MSH6, PMS2, and EPCAM)  Currently five genes are known to cause Mike Syndrome: MLH1, MSH2,  MSH6, PMS2, and EPCAM.  A single mutation in one of the Mike Syndrome genes increases the risk for colon, endometrial, ovarian, and stomach cancers.  Other cancers that occur less commonly in Mike Syndrome include urinary tract, skin, and brain cancers.  The chart below shows the chance that a person with Mike syndrome would develop cancer in his or her lifetime5.      *Cancer risk varies depending on Mike syndrome gene found    Cowden Syndrome   (PTEN)  Cowden syndrome is a hereditary condition that increases the risk for breast, thyroid, endometrial, colon, and kidney cancer.  Cowden syndrome is caused by a mutation in the PTEN gene.  A single mutation in one of the copies of PTEN causes Cowden syndrome and increases cancer risk.  The chart below shows the chance that someone with a PTEN mutation would develop cancer in their lifetime6,7.  Other benign features seen in some individuals with Cowden syndrome include benign skin lesions (facial papules, keratoses, lipomas), learning disability, autism, thyroid nodules, colon polyps, and larger head size.      *One recent study found breast cancer risk to be increased to 85%    Li-Fraumeni Syndrome   (TP53)  Li-Fraumeni Syndrome (LFS) is a cancer predisposition syndrome caused by a mutation in the TP53 gene. A single mutation in one of the copies of TP53 increases the risk for multiple cancers. Individuals with LFS are at an increased risk for developing cancer at a young age. The lifetime risk for development of a LFS-associated cancer is 50% by age 30 and 90% by age 60.   Core Cancers: Sarcomas, Breast, Brain, Lung, Leukemias/Lymphomas, Adrenocortical carcinomas  Other Cancers: Gastrointestinal, Thyroid, Skin, Genitourinary    Hereditary Diffuse Gastric Cancer   (CDH1)  Currently, one gene is known to cause hereditary diffuse gastric cancer (HDGC): CDH1.  Individuals with HDGC are at increased risk for diffuse gastric cancer and lobular breast cancer. Of people  diagnosed with HDGC, 30-50% have a mutation in the CDH1 gene.  This suggests there are likely other genes that may cause HDGC that have not been identified yet.      Lifetime Cancer Risks    General Population HDGC    Diffuse Gastric  <1% ~80%   Breast 12% 39-52%         Additional Genes  CARLI  CARLI is a moderate-risk breast cancer gene. Women who have a mutation in CARLI can have between a 2-4 fold increased risk for breast cancer compared to the general population8. CARLI mutations have also been associated with increased risk for pancreatic cancer, however an estimate of this cancer risk is not well understood9. Individuals who inherit two CARLI mutations have a condition called ataxia-telangiectasia (AT).  This rare autosomal recessive condition affects the nervous system and immune system, and is associated with progressive cerebellar ataxia beginning in childhood.  Individuals with ataxia-telangiectasia often have a weakened immune system and have an increased risk for childhood cancers.    PALB2  Mutations in PALB2 have been shown to increase the risk of breast cancer up to 33-58% in some families; where individuals fall within this risk range is dependent upon family [id number]. PALB2 mutations have also been associated with increased risk for pancreatic cancer, although this risk has not been quantified yet.  Individuals who inherit two PALB2 mutations--one from their mother and one from their father--have a condition called Fanconi Anemia.  This rare autosomal recessive condition is associated with short stature, developmental delay, bone marrow failure, and increased risk for childhood cancers.    CHEK2   CHEK2 is a moderate-risk breast cancer gene.  Women who have a mutation in CHEK2 have around a 2-fold increased risk for breast cancer compared to the general population, and this risk may be higher depending upon family history.11,12,13 Mutations in CHEK2 have also been shown to increase the risk of a number of  other cancers, including colon and prostate, however these cancer risks are currently not well understood.    BRIP1, RAD51C and RAD51D  Mutations in BRIP1, RAD51C, and RAD51D have been shown to increase the risk of ovarian cancer and possibly female breast cancer as well14,15 .       Lifetime Cancer Risk    General Population BRIP1 RAD51C RAD51D   Ovarian 1-2% ~5-8% ~5-9% ~7-15%           Inheritance  All of the cancer syndromes reviewed above are inherited in an autosomal dominant pattern.  This means that if a parent has a mutation, each of his or her children will have a 50% chance of inheriting that same mutation.  Therefore, each child--male or female--would have a 50% chance of being at increased risk for developing cancer.      Image obtained from [employer], 2013     Mutations in some genes can occur de maynor, which means that a person s mutation occurred for the first time in them and was not inherited from a parent.  Now that they have the mutation, however, it can be passed on to future generations.    Genetic Testing  Genetic testing involves a blood test and will look at the genetic information in the CARLI, BRCA1, BRCA2, BRIP1, CDH1, CHEK2, MLH1, MSH2, MSH6, PMS2, EPCAM, PTEN, PALB2, RAD51C, RAD51D, and TP53 genes for any harmful mutations that are associated with increased cancer risk.  If possible, it is recommended that the person(s) who has had cancer be tested before other family members.  That person will give us the most useful information about whether or not a specific gene is associated with the cancer in the family.    Results  There are three possible results of genetic testing:    Positive--a harmful mutation was identified in one or more of the genes    Negative--no mutation was identified in any of the genes on this panel    Variant of unknown significance--a variation in one of the genes was identified, but it is unclear how this impacts cancer risk in the family    Advantages  and Disadvantages   There are advantages and disadvantages to genetic testing.    Advantages    May clarify your cancer risk    Can help you make medical decisions    May explain the cancers in your family    May give useful information to your family members (if you share your results)    Disadvantages    Possible negative emotional impact of learning about inherited cancer risk    Uncertainty in interpreting a negative test result in some situations    Possible genetic discrimination concerns (see below)    Genetic Information Nondiscrimination Act (KOLBY)  KOLBY is a federal law that protects individuals from health insurance or employment discrimination based on a genetic test result alone.  Although rare, there are currently no legal discrimination protections in terms of life insurance, long term care, or disability insurances.  Visit the [place] [employer] [place] website to learn more.    Reducing Cancer Risk  All of the genes described above have nationally recognized cancer screening guidelines that would be recommended for individuals who test positive.  In addition to increased cancer screening, surgeries may be offered or recommended to reduce cancer risk.  Recommendations are based upon an individual s genetic test result as well as their personal and family history of cancer.    Questions to Think About Regarding Genetic Testing:    What effect will the test result have on me and my relationship with my family members if I have an inherited gene mutation?  If I don t have a gene mutation?    Should I share my test results, and how will my family react to this news, which may also affect them?    Are my children ready to learn new information that may one day affect their own health?    Hereditary Cancer Resources    FORCE: Facing Our Risk of Cancer Empowered facingourrisk.org   Bright Pink bebrightpink.org   Li-Fraumeni Syndrome Association lfsassociation.org   PTEN World PTENworld.com    No stomach for cancer, Inc. nostomachforcancer.org   Stomach cancer relief network Scrnet.org   Collaborative Group of the Americas on Inherited Colorectal Cancer (CGA) cgaicc.com    Cancer Care cancercare.org   American Cancer Society (ACS) cancer.org   National Cancer [place] (NCI) cancer.gov     Please call us if you have any questions or concerns.   Cancer Risk Management Program [phone] ([phone])  ? Cody Melgar, MS, [facility] [phone]  ? Lilibeth Feliz, MS, [facility]  [phone]  ? Kinza Kalikatie, MS, [facility]  [phone]  ? Michelle Flores, MS, [facility] [phone]  ? Brittney Bhavna, MS, [facility] [phone]  ? Tamanna Prateek, MS, [facility]  [phone]  ? Yara Jc, MS  [phone]    References  1. Ayla A, Olivia PDP, So S, Sachin CENTENO, Lei JE, Natacha JL, Abimael N, Mickey H, Shravan O, Bettye A, Geovanyini B, Radisiria P, Manoukisherly S, Sushil DM, New N, Bridger E, Mariano H, Lanre E, Abhishek J, Gronkeysha J, Jessica B, Giovanna H, Thorlacius S, Eerola H, Nevsherlylinna H, Joi K, Johnna OP. Average risks of breast and ovarian cancer associated with BRCA1 or BRCA2 mutations detected in case series unselected for family history: a combined analysis of 222 studies. Am J Hum Oksana. 2003;72:1117-30.  2. Thierry N, Yocasta M, Felisha G.  BRCA1 and BRCA2 Hereditary Breast and Ovarian Cancer. Gene Reviews online. 2013.  3. Perry YC, Barb S, Emery G, Mauricio S. Breast cancer risk among male BRCA1 and BRCA2 mutation carriers. J Natl Cancer Inst. 2007;99:1811-4.  4. Leobardo CHRISTOPHER, Crista I, Karan J, Jose Miguel E, Qasim ER, China F. Risk of breast cancer in male BRCA2 carriers. J Med Oksana. 2010;47:710-1.  5. National Comprehensive Cancer Network. Clinical practice guidelines in oncology, colorectal cancer screening. Available online (registration required). 2015.  6. Maurice MENDOZA, Hilary J, Johnny J, Sebastián TORRES, Pilo CABRALES, Liset C. Lifetime cancer risks in individuals with germline PTEN mutations. Clin Cancer Res.  2012;18:400-7.  7. Sandeep RAY. Cowden Syndrome: A Critical Review of the Clinical Literature. J Oksana . 2009:18:13-27.  8. Adrienne CROFT, Andrey D, Xander S, Myah P, Rossana T, Danna M, Forrest B, Nataliya H, Emery R, Sonia K, Reji L, Leobardo DG, Sushil D, Louie DF, Emmanuel MR, The Breast Cancer Susceptibility Collaboration (UK) & Bentley HWANG. CARLI mutations that cause ataxia-telangiectasia are breast cancer susceptibility alleles. Nature Genetics. 2006;38:873-875  9. Eugene N , Stephanie Y, Heidi J, Priti L, Kamlesh GM , Bety ML, Gallinger S, Deleon AG, Syngal S, Ester ML, Sorin J , Elvis R, Adalgisa SZ, Gustavo JR, Angie VE, Nelson M, Vogelstein B, Robin N, Portia RH, Salome KW, and Paolo AP. CARLI mutations in patients with hereditary pancreatic cancer. Cancer Discover. 2012;2:41-46  10. Ayla JOHN, et al. Breast-Cancer Risk in Families with Mutations in PALB2. NEJM. 2014; 371(6):497-506.  11. CHEK2 Breast Cancer Case-Control Consortium. CHEK2*1100delC and susceptibility to breast cancer: A collaborative analysis involving 10,860 breast cancer cases and 9,065 controls from 10 studies. Am J Hum Oksana, 74 (2004), pp. 3264-9772  12. Cindy T, Arben S, Baltazar K, et al. Spectrum of Mutations in BRCA1, BRCA2, CHEK2, and TP53 in Families at High Risk of Breast Cancer. BILL. 2006;295(12):8592-0886.   13. Katharine DUENAS, Keshia DAVIS, Keya A, et al. Risk of breast cancer in women with a CHEK2 mutation with and without a family history of breast cancer. J Clin Oncol. 2011;29:7137-7522.  14. Paulino H, Lew E, Kyler SJ, et al. Contribution of germline mutations in the RAD51B, RAD51C, and RAD51D genes to ovarian cancer in the population. J Clin Oncol. 2015;33(26):1255-5405. Doi:10.1200/JCO.2015.61.2408.  15. Shy T, Sundar HEAD, Miracle P, et al. Mutations in BRIP1 confer high risk of ovarian cancer. Jessica Oksana. 2011;43(11):5017-5978. doi:10.1038/ng.955.

## 2021-08-30 NOTE — TELEPHONE ENCOUNTER
Shadi, pts sister, called stating the prescription prescribed today is not covered by the insurance. Pt is requesting an alternative to be sent over ASAP as pt has a chemo appt tomorrow and this would benefit pt for the appt. Please call Shadi back [phone].

## 2021-08-31 ENCOUNTER — TELEPHONE (OUTPATIENT)
Dept: SURGERY | Facility: CLINIC | Age: 36
End: 2021-08-31

## 2021-08-31 NOTE — TELEPHONE ENCOUNTER
Patients sister Shadi is calling stating the pharmacy is not able to fill the RX that was sent.  Please call her back to discuss this further [phone]

## 2021-08-31 NOTE — TELEPHONE ENCOUNTER
Hilda is calling back stating the ointment is on back order until 9/10/2021. Pharmacy is also stating that her insurance will only cover this one brand that is on back order.

## 2021-08-31 NOTE — PROGRESS NOTES
8/30/2021    Joanne is a 36 year old who is being evaluated via a billable telephone visit.      What phone number would you like to be contacted at? [phone]  How would you like to obtain your AVS? Mail a copy    Phone call duration: 54 minutes    Referring Provider: Chelly Sanchez    Present for Today's Visit: Joanne, telephone Khmer  #[phone]    Presenting Information:   I met with Joanne Colon today for genetic counseling (telephone visit due to covid19) to discuss her personal and family history of breast cancer.  She is here today to review this history, cancer screening recommendations, and available genetic testing options.    Personal History:  Joanne is a 36 year old female. She was recently diagnosed with a right breast cancer. Biopsy on 4//13/2021 revealed an invasive ductal carcinoma, grade 3, ER positive, NC positive, and HER2 positive. She is following with Dr. Sanchez and has completed 5 cycles of neoadjuvant chemotherapy (TC/HP). She is nearing the end of her neoadjuvant chemo and is leaning towards bilateral mastectomies, however, the results from genetic testing will help determine whether bilateral mastectomy is the right surgical option for her.     She had her first menstrual period at age 14 (10th grade), her first child at age 23, and is premenopausal.  Joanne has her ovaries, fallopian tubes and uterus in place, and she has had no ovarian cancer screening to date.  She reports no history of oral contraceptive use and that she has never been on hormone replacement therapy.      She has never had a colonoscopy given her age. She does not regularly do any other cancer screening at this time.  Joanne reported no tobacco use, and no alcohol use.    Family History: Cancer family history and pertinent information reviewed below (Please see scanned pedigree for detailed family history information)    One maternal aunt in her mid to late 70's with a history of breast cancer diagnosed around  age 70.     Paternal first-cousin, age 25, with a history of a leukemia diagnosed at age 3.       Her maternal and paternal ethnicity is [place]ian. There is no known Ashkenazi Voodoo ancestry on either side of her family. There is no reported consanguinity.    Discussion:    Joanne's personal and family history of breast cancer is suggestive of a hereditary cancer syndrome.    We reviewed the features of sporadic, familial, and hereditary cancers. In looking at Joanne's family history, it is possible that a cancer susceptibility gene is present due to her early-onset breast cancer history as well has her maternal aunt's breast cancer history.    We discussed the natural history and genetics of hereditary cancers. A detailed handout regarding the information we discussed will be sent to Joanne in US mail. Topics included: inheritance pattern, cancer risks, cancer screening recommendations, and also risks, benefits and limitations of testing.  We reviewed that the most common cause of hereditary breast cancer is Hereditary Breast and Ovarian Cancer (HBOC) syndrome, which is caused by mutations in the genes BRCA1 and BRCA2. Women with a mutation in either of these genes are at increased risk for breast and ovarian cancer. There is also an increased risk for a second primary breast cancer for women. Men with a mutation in either BRCA1 or BRCA2 are at increased risk for male breast and prostate cancer. Both women and men may also be at increased risk for pancreatic cancer and melanoma.     Based on her personal and family history, Joanne meets current National Comprehensive Cancer Network (NCCN) criteria for genetic testing of high-penetrance breast cancer susceptibility genes.      We discussed that there are additional genes that could cause increased risk for breast cancer. As many of these genes present with overlapping features in a family and accurate cancer risk cannot always be established based upon the pedigree  analysis alone, it would be reasonable for Joanne to consider panel genetic testing to analyze multiple genes at once.    We discussed genetic testing options for Joanne given her personal and family history including the BRCA1/2 and additional genes associated with increased risk for breast cancer ([facility] Breast and Gyn Cancers Panel; 23 genes). After our discussion, Joanne opted to proceed with genetic testing via the Breast and Gyn Cancers panel through [facility].   Genetic testing is available for 23 genes associated with hereditary gynecologic, breast, and related cancers: [facility]'s Breast and Gyn Cancers panel (CARLI, BARD1, BRCA1, BRCA2, BRIP1, CDH1, CHEK2, DICER1, EPCAM, MLH1, MSH2, MSH6, NBN, NF1, PALB2, PMS2, PTEN, RAD50, RAD51C, RAD51D, SMARCA4, STK11, TP53).  We discussed that some of the genes in the Breast and Gyn Cancers panel are associated with specific hereditary cancer syndromes and published management guidelines: Hereditary Breast and Ovarian Cancer syndrome (BRCA1, BRCA2), Mike syndrome (MLH1, MSH2, MSH6, PMS2, EPCAM), Hereditary Diffuse Gastric Cancer (CDH1), Cowden syndrome (PTEN), Li Fraumeni syndrome (TP53), Peutz-Jeghers syndrome (STK11), and Neurofibromatosis type 1 (NF1).    Risk-reducing salpingo-oophorectomy can be considered in women with mutations in BRIP1, RAD51C, or RAD51D. Breast and/or other cancer risk management guidelines are available for CARLI, CHEK2, PALB2, NF1, and NBN.  The remaining genes (BARD1, DICER1, RAD50, and SMARCA4) are associated with increased cancer risk and may allow us to make medical recommendations when mutations are identified.       Consent was obtained over the phone with no witness required due to the current covid19 global pandemic.    Medical Management: For Joanne, we reviewed that the information from genetic testing may determine:    surgery to treat Joanne's active cancer diagnosis (i.e. lumpectomy versus bilateral mastectomy, partial versus total  colectomy, etc.),    additional cancer screening for which Joanne may qualify (i.e. mammogram and breast MRI, more frequent colonoscopies, more frequent dermatologic exams, etc.),    options for risk reducing surgeries Joanne could consider (i.e. bilateral mastectomy, surgery to remove her ovaries and/or uterus, etc.),      and targeted chemotherapies for Joanne's active cancer, or if she were to develop certain cancers in the future (i.e. immunotherapy for individuals with Mike syndrome, PARP inhibitors, etc.).     These recommendations and possible targeted chemotherapies will be discussed in detail once genetic testing is completed.    Joanne will have her blood drawn for genetic testing at her upcoming infusion appointment on 9/2 at [facility].    We discussed that Joanne's surgical decisions are pending genetic testing results. For that reason, I will place a STAT request on the genes that may impact surgical decisions (CARLI, BRCA1, BRCA2, CDH1, CHEK2, PALB2, PTEN, STK11, and TP53) in order to get these results back as soon as possible.      Plan:  1) Joanne opted to proceed with genetic testing via the Breat and Gyn Cancers panel (23 genes) through [facility].   2) A STAT request was placed on the BRCAPlus genes (including BRCA1 and BRCA2) and we will get these results back in 7-10 days. I will call Joanne with these results as soon as they are available.     Brittney Huggins MS, [facility]  Licensed, Certified Genetic Counselor  [facility]  [phone]  [email or web address]

## 2021-08-31 NOTE — TELEPHONE ENCOUNTER
Spoke with Joanne through  [place] [id number] to schedule surgery. Per Joanne, her last day of chemo is on Sept. 2nd and asked that we check with DR. Perdomo first on dates to schedule surgery. She will go with whichever date Dr. Perdomo says is appropriate. Will call Joanne back to schedule surgery.     Ramandeep VENCES  Surgery Scheduler  [facility]  Surgery Clinic [facility]  P. [phone]  F. [phone]

## 2021-08-31 NOTE — PATIENT INSTRUCTIONS
Assessing Cancer Risk  Only about 5-10% of cancers are thought to be due to an inherited cancer susceptibility gene.    These families often have:    Several people with the same or related types of cancer    Cancers diagnosed at a young age (before age 50)    Individuals with more than one primary cancer    Multiple generations of the family affected with cancer    Some people may be candidates for genetic testing of more than one gene.  For these families, genetic testing using a cancer panel may be offered.  These panels will test different genes known to increase the risk for breast, ovarian, uterine, and/or other cancers. All of the genes discussed below have published clinical management guidelines for individuals who are found to carry a mutation. The purpose of this handout is to serve as a brief summary of the genes analyzed by the panels used to inquire about hereditary breast and gynecologic cancer:  CARLI, BRCA1, BRCA2, BRIP1, CDH1, CHEK2, MLH1, MSH2, MSH6, PMS2, EPCAM, PTEN, PALB2, RAD51C, RAD51D, and TP53.  ______________________________________________________________________________  Hereditary Breast and Ovarian Cancer Syndrome   (BRCA1 and BRCA2)  A single mutation in one of the copies of BRCA1 or BRCA2 increases the risk for breast and ovarian cancer, among others.  The risk for pancreatic cancer and melanoma may also be slightly increased in some families.  The chart below shows the chance that someone with a BRCA mutation would develop cancer in his or her lifetime1,2,3,4.        A person s ethnic background is also important to consider, as individuals of Ashkenazi Denominational ancestry have a higher chance of having a BRCA gene mutation.  There are three BRCA mutations that occur more frequently in this population.    Mike Syndrome   (MLH1, MSH2, MSH6, PMS2, and EPCAM)  Currently five genes are known to cause Mike Syndrome: MLH1, MSH2, MSH6, PMS2, and EPCAM.  A single mutation in one of the  Mike Syndrome genes increases the risk for colon, endometrial, ovarian, and stomach cancers.  Other cancers that occur less commonly in Mike Syndrome include urinary tract, skin, and brain cancers.  The chart below shows the chance that a person with Mike syndrome would develop cancer in his or her lifetime5.      *Cancer risk varies depending on Mike syndrome gene found    Cowden Syndrome   (PTEN)  Cowden syndrome is a hereditary condition that increases the risk for breast, thyroid, endometrial, colon, and kidney cancer.  Cowden syndrome is caused by a mutation in the PTEN gene.  A single mutation in one of the copies of PTEN causes Cowden syndrome and increases cancer risk.  The chart below shows the chance that someone with a PTEN mutation would develop cancer in their lifetime6,7.  Other benign features seen in some individuals with Cowden syndrome include benign skin lesions (facial papules, keratoses, lipomas), learning disability, autism, thyroid nodules, colon polyps, and larger head size.      *One recent study found breast cancer risk to be increased to 85%    Li-Fraumeni Syndrome   (TP53)  Li-Fraumeni Syndrome (LFS) is a cancer predisposition syndrome caused by a mutation in the TP53 gene. A single mutation in one of the copies of TP53 increases the risk for multiple cancers. Individuals with LFS are at an increased risk for developing cancer at a young age. The lifetime risk for development of a LFS-associated cancer is 50% by age 30 and 90% by age 60.   Core Cancers: Sarcomas, Breast, Brain, Lung, Leukemias/Lymphomas, Adrenocortical carcinomas  Other Cancers: Gastrointestinal, Thyroid, Skin, Genitourinary    Hereditary Diffuse Gastric Cancer   (CDH1)  Currently, one gene is known to cause hereditary diffuse gastric cancer (HDGC): CDH1.  Individuals with HDGC are at increased risk for diffuse gastric cancer and lobular breast cancer. Of people diagnosed with HDGC, 30-50% have a mutation in the CDH1  gene.  This suggests there are likely other genes that may cause HDGC that have not been identified yet.      Lifetime Cancer Risks    General Population HDGC    Diffuse Gastric  <1% ~80%   Breast 12% 39-52%         Additional Genes  CARLI  CARLI is a moderate-risk breast cancer gene. Women who have a mutation in CARLI can have between a 2-4 fold increased risk for breast cancer compared to the general population8. CARLI mutations have also been associated with increased risk for pancreatic cancer, however an estimate of this cancer risk is not well understood9. Individuals who inherit two CARLI mutations have a condition called ataxia-telangiectasia (AT).  This rare autosomal recessive condition affects the nervous system and immune system, and is associated with progressive cerebellar ataxia beginning in childhood.  Individuals with ataxia-telangiectasia often have a weakened immune system and have an increased risk for childhood cancers.    PALB2  Mutations in PALB2 have been shown to increase the risk of breast cancer up to 33-58% in some families; where individuals fall within this risk range is dependent upon family [email or web address]. PALB2 mutations have also been associated with increased risk for pancreatic cancer, although this risk has not been quantified yet.  Individuals who inherit two PALB2 mutations--one from their mother and one from their father--have a condition called Fanconi Anemia.  This rare autosomal recessive condition is associated with short stature, developmental delay, bone marrow failure, and increased risk for childhood cancers.    CHEK2   CHEK2 is a moderate-risk breast cancer gene.  Women who have a mutation in CHEK2 have around a 2-fold increased risk for breast cancer compared to the general population, and this risk may be higher depending upon family history.11,12,13 Mutations in CHEK2 have also been shown to increase the risk of a number of other cancers, including colon and prostate, however  these cancer risks are currently not well understood.    BRIP1, RAD51C and RAD51D  Mutations in BRIP1, RAD51C, and RAD51D have been shown to increase the risk of ovarian cancer and possibly female breast cancer as well14,15 .       Lifetime Cancer Risk    General Population BRIP1 RAD51C RAD51D   Ovarian 1-2% ~5-8% ~5-9% ~7-15%           Inheritance  All of the cancer syndromes reviewed above are inherited in an autosomal dominant pattern.  This means that if a parent has a mutation, each of his or her children will have a 50% chance of inheriting that same mutation.  Therefore, each child--male or female--would have a 50% chance of being at increased risk for developing cancer.      Image obtained from Genetics Home Reference, 2013     Mutations in some genes can occur de maynor, which means that a person s mutation occurred for the first time in them and was not inherited from a parent.  Now that they have the mutation, however, it can be passed on to future generations.    Genetic Testing  Genetic testing involves a blood test and will look at the genetic information in the CARLI, BRCA1, BRCA2, BRIP1, CDH1, CHEK2, MLH1, MSH2, MSH6, PMS2, EPCAM, PTEN, PALB2, RAD51C, RAD51D, and TP53 genes for any harmful mutations that are associated with increased cancer risk.  If possible, it is recommended that the person(s) who has had cancer be tested before other family members.  That person will give us the most useful information about whether or not a specific gene is associated with the cancer in the family.    Results  There are three possible results of genetic testing:    Positive--a harmful mutation was identified in one or more of the genes    Negative--no mutation was identified in any of the genes on this panel    Variant of unknown significance--a variation in one of the genes was identified, but it is unclear how this impacts cancer risk in the family    Advantages and Disadvantages   There are advantages and  disadvantages to genetic testing.    Advantages    May clarify your cancer risk    Can help you make medical decisions    May explain the cancers in your family    May give useful information to your family members (if you share your results)    Disadvantages    Possible negative emotional impact of learning about inherited cancer risk    Uncertainty in interpreting a negative test result in some situations    Possible genetic discrimination concerns (see below)    Genetic Information Nondiscrimination Act (KOLBY)  KOLBY is a federal law that protects individuals from health insurance or employment discrimination based on a genetic test result alone.  Although rare, there are currently no legal discrimination protections in terms of life insurance, long term care, or disability insurances.  Visit the [employer] [place] [employer] website to learn more.    Reducing Cancer Risk  All of the genes described above have nationally recognized cancer screening guidelines that would be recommended for individuals who test positive.  In addition to increased cancer screening, surgeries may be offered or recommended to reduce cancer risk.  Recommendations are based upon an individual s genetic test result as well as their personal and family history of cancer.    Questions to Think About Regarding Genetic Testing:    What effect will the test result have on me and my relationship with my family members if I have an inherited gene mutation?  If I don t have a gene mutation?    Should I share my test results, and how will my family react to this news, which may also affect them?    Are my children ready to learn new information that may one day affect their own health?    Hereditary Cancer Resources    FORCE: Facing Our Risk of Cancer Empowered facingourrisk.org   Bright Pink bebrightpink.org   Li-Fraumeni Syndrome Association lfsassociation.org   PTEN World PTENworld.com   No stomach for cancer, Inc.  nostomachforcancer.org   Stomach cancer relief network Scrnet.org   Collaborative Group of the Americas on Inherited Colorectal Cancer (CGA) cgaicc.com    Cancer Care cancercare.org   American Cancer Society (ACS) cancer.org   National Cancer [place] (NCI) cancer.gov     Please call us if you have any questions or concerns.   Cancer Risk Management Program [phone] ([phone])  ? Cody Melgar, MS, [facility] [place] [facility] [phone]  ? Lilibeth Feliz, MS, [facility]  [phone]  ? Kinza Polo, MS, [facility]  [phone]  ? Michelle Mark, MS, [facility] [phone]  ? Rbittney Bhavna, MS, [facility] [phone]  ? Tamanna Prateek, MS, [facility]  [phone]  ? Yara Jc, MS  [phone]    References  1. Ayla CROFT, Olivia PDP, So S, Sachin CENTENO, Lei JE, Natacha JL, Abimael N, Mickey H, Shravan O, Bettye A, Long B, Radisiria P, Mancaterina S, Sushil DM, New N, Bridger E, Mariano H, Lanre E, Abhishek J, Gronkeysha J, Jessica B, Giovanna H, Thorlacius S, Eerola H, Nevjessicana H, Joi K, Johnna OP. Average risks of breast and ovarian cancer associated with BRCA1 or BRCA2 mutations detected in case series unselected for family history: a combined analysis of 222 studies. Am J Hum Oksana. 2003;72:1117-30.  2. Thierry N, Yocasta M, Felisha G.  BRCA1 and BRCA2 Hereditary Breast and Ovarian Cancer. Gene Reviews online. 2013.  3. Perry YC, Barb S, Emery G, Mauricio S. Breast cancer risk among male BRCA1 and BRCA2 mutation carriers. J Natl Cancer Inst. 2007;99:1811-4.  4. Leobardo CHRISTOPHER, Crista I, Karan J, Jose Miguel E, Qasim ER, [place] F. Risk of breast cancer in male BRCA2 carriers. J Med Oksana. 2010;47:710-1.  5. National Comprehensive Cancer Network. Clinical practice guidelines in oncology, colorectal cancer screening. Available online (registration required). 2015.  6. Maurice MENDOZA, Hilary J, Johnny J, Sebastián LA, Pilo CABRALES, Eng C. Lifetime cancer risks in individuals with germline PTEN mutations. Clin Cancer Res. 2012;18:400-7.  7. Pilarski R. Cowden  Syndrome: A Critical Review of the Clinical Literature. J Oksana . 2009:18:13-27.  8. Adrienne A, Andrey D, Xander S, Myah P, Rossana T, Danna M, Forrest B, Nataliya H, Emery R, Sonia K, Reji L, Leobardo DG, Sushil D, Louie DF, Emmanuel MR, The Breast Cancer Susceptibility Collaboration (UK) & Bentley HWANG. CARLI mutations that cause ataxia-telangiectasia are breast cancer susceptibility alleles. Nature Genetics. 2006;38:873-875  9. Eugene N , Stephanie Y, Heidi J, Priti L, Kamlesh GM , Bety ML, Gallinger S, Deleon AG, Syngal S, Ester ML, Sorin J , Elvis R, Adalgisa SZ, Gustavo JR, Angie VE, Nelson M, Voana lilia B, Robin N, Portia RH, Salome KW, and Paolo AP. CARLI mutations in patients with hereditary pancreatic cancer. Cancer Discover. 2012;2:41-46  10. Ayla JOHN, et al. Breast-Cancer Risk in Families with Mutations in PALB2. NEJM. 2014; 371(6):497-506.  11. CHEK2 Breast Cancer Case-Control Consortium. CHEK2*1100delC and susceptibility to breast cancer: A collaborative analysis involving 10,860 breast cancer cases and 9,065 controls from 10 studies. Am J Hum Oksana, 74 (2004), pp. 0800-4602  12. Cindy T, Arben S, Baltazar K, et al. Spectrum of Mutations in BRCA1, BRCA2, CHEK2, and TP53 in Families at High Risk of Breast Cancer. BILL. 2006;295(12):8240-9586.   13. Katharine C, Keshia D, Keya A, et al. Risk of breast cancer in women with a CHEK2 mutation with and without a family history of breast cancer. J Clin Oncol. 2011;29:0049-1346.  14. Paulino H, Lew E, Kyler SJ, et al. Contribution of germline mutations in the RAD51B, RAD51C, and RAD51D genes to ovarian cancer in the population. J Clin Oncol. 2015;33(26):7516-8516. Doi:10.1200/JCO.2015.61.2408.  15. Shy T, Sundar HEAD, Miracle P, et al. Mutations in BRIP1 confer high risk of ovarian cancer. Jessica Oksana. 2011;43(11):2321-7111. doi:10.1038/ng.955.

## 2021-08-31 NOTE — TELEPHONE ENCOUNTER
No pa needed- insurance plan covers a specific ndc or drug  for this medication. Provided the pharmacy with the covered NDC and claim is paid. However, the pharmacy does not have this ndc in stock and will have to order it in. If there is any further issue with ordering the product, pharmacy will notify the clinic.

## 2021-08-31 NOTE — TELEPHONE ENCOUNTER
Writer spoke with Shadi and informed her that PA was started. Informed her it can take a couple of days to get the status of the authorization. She stated that she needs the ointment ASAP. She stated that if she pays of out pocket her insurance will be cancelled. Writer informed her that she has not heard of that before and encouraged her to contact her insurance to claify.  Then she insisted that she needs the ointment because her sister is in pain.

## 2021-09-01 ENCOUNTER — HOSPITAL ENCOUNTER (OUTPATIENT)
Facility: HOSPITAL | Age: 36
End: 2021-09-01
Attending: SPECIALIST | Admitting: SPECIALIST

## 2021-09-01 DIAGNOSIS — Z17.0 MALIGNANT NEOPLASM OF NIPPLE OF RIGHT BREAST IN FEMALE, ESTROGEN RECEPTOR POSITIVE (H): Primary | ICD-10-CM

## 2021-09-01 DIAGNOSIS — Z51.11 ENCOUNTER FOR ANTINEOPLASTIC CHEMOTHERAPY: ICD-10-CM

## 2021-09-01 DIAGNOSIS — C50.011 MALIGNANT NEOPLASM OF NIPPLE OF RIGHT BREAST IN FEMALE, ESTROGEN RECEPTOR POSITIVE (H): Primary | ICD-10-CM

## 2021-09-01 NOTE — TELEPHONE ENCOUNTER
Sister went to pharmacy two more times and they are telling her they still can't fill the prescription. Please assist and follow up as soon as possible.  Shadi [phone]

## 2021-09-02 ENCOUNTER — LAB (OUTPATIENT)
Dept: INFUSION THERAPY | Facility: CLINIC | Age: 36
End: 2021-09-02
Attending: INTERNAL MEDICINE
Payer: MEDICAID

## 2021-09-02 ENCOUNTER — ONCOLOGY VISIT (OUTPATIENT)
Dept: ONCOLOGY | Facility: CLINIC | Age: 36
End: 2021-09-02
Attending: INTERNAL MEDICINE
Payer: MEDICAID

## 2021-09-02 VITALS
BODY MASS INDEX: 21.41 KG/M2 | HEIGHT: 64 IN | HEART RATE: 94 BPM | RESPIRATION RATE: 16 BRPM | SYSTOLIC BLOOD PRESSURE: 110 MMHG | WEIGHT: 125.4 LBS | DIASTOLIC BLOOD PRESSURE: 64 MMHG | OXYGEN SATURATION: 100 % | TEMPERATURE: 98.4 F

## 2021-09-02 DIAGNOSIS — C50.211 MALIGNANT NEOPLASM OF UPPER-INNER QUADRANT OF RIGHT BREAST IN FEMALE, ESTROGEN RECEPTOR POSITIVE (H): ICD-10-CM

## 2021-09-02 DIAGNOSIS — C50.011 MALIGNANT NEOPLASM OF NIPPLE OF RIGHT BREAST IN FEMALE, ESTROGEN RECEPTOR POSITIVE (H): Primary | ICD-10-CM

## 2021-09-02 DIAGNOSIS — Z80.3 FAMILY HISTORY OF MALIGNANT NEOPLASM OF BREAST: ICD-10-CM

## 2021-09-02 DIAGNOSIS — D70.1 CHEMOTHERAPY-INDUCED NEUTROPENIA (H): ICD-10-CM

## 2021-09-02 DIAGNOSIS — Z17.0 MALIGNANT NEOPLASM OF NIPPLE OF RIGHT BREAST IN FEMALE, ESTROGEN RECEPTOR POSITIVE (H): Primary | ICD-10-CM

## 2021-09-02 DIAGNOSIS — Z51.11 ENCOUNTER FOR ANTINEOPLASTIC CHEMOTHERAPY: ICD-10-CM

## 2021-09-02 DIAGNOSIS — Z17.0 MALIGNANT NEOPLASM OF UPPER-INNER QUADRANT OF RIGHT BREAST IN FEMALE, ESTROGEN RECEPTOR POSITIVE (H): ICD-10-CM

## 2021-09-02 DIAGNOSIS — K12.31 MUCOSITIS DUE TO CHEMOTHERAPY: ICD-10-CM

## 2021-09-02 DIAGNOSIS — T45.1X5A CHEMOTHERAPY-INDUCED NEUTROPENIA (H): ICD-10-CM

## 2021-09-02 LAB
ALBUMIN SERPL-MCNC: 3.9 G/DL (ref 3.5–5)
ALP SERPL-CCNC: 63 U/L (ref 45–120)
ALT SERPL W P-5'-P-CCNC: 17 U/L (ref 0–45)
ANION GAP SERPL CALCULATED.3IONS-SCNC: 12 MMOL/L (ref 5–18)
AST SERPL W P-5'-P-CCNC: 17 U/L (ref 0–40)
BASOPHILS # BLD AUTO: 0 10E3/UL (ref 0–0.2)
BASOPHILS NFR BLD AUTO: 0 %
BILIRUB SERPL-MCNC: 0.3 MG/DL (ref 0–1)
BUN SERPL-MCNC: 13 MG/DL (ref 8–22)
CALCIUM SERPL-MCNC: 9.8 MG/DL (ref 8.5–10.5)
CHLORIDE BLD-SCNC: 102 MMOL/L (ref 98–107)
CO2 SERPL-SCNC: 23 MMOL/L (ref 22–31)
CREAT SERPL-MCNC: 0.67 MG/DL (ref 0.6–1.1)
EOSINOPHIL # BLD AUTO: 0 10E3/UL (ref 0–0.7)
EOSINOPHIL NFR BLD AUTO: 0 %
ERYTHROCYTE [DISTWIDTH] IN BLOOD BY AUTOMATED COUNT: 13.7 % (ref 10–15)
GFR SERPL CREATININE-BSD FRML MDRD: >90 ML/MIN/1.73M2
GLUCOSE BLD-MCNC: 163 MG/DL (ref 70–125)
HCT VFR BLD AUTO: 35 % (ref 35–47)
HGB BLD-MCNC: 11.5 G/DL (ref 11.7–15.7)
HOLD SPECIMEN: NORMAL
IMM GRANULOCYTES # BLD: 0 10E3/UL
IMM GRANULOCYTES NFR BLD: 0 %
LYMPHOCYTES # BLD AUTO: 1 10E3/UL (ref 0.8–5.3)
LYMPHOCYTES NFR BLD AUTO: 20 %
MCH RBC QN AUTO: 31.7 PG (ref 26.5–33)
MCHC RBC AUTO-ENTMCNC: 32.9 G/DL (ref 31.5–36.5)
MCV RBC AUTO: 96 FL (ref 78–100)
MONOCYTES # BLD AUTO: 0.1 10E3/UL (ref 0–1.3)
MONOCYTES NFR BLD AUTO: 2 %
NEUTROPHILS # BLD AUTO: 4.1 10E3/UL (ref 1.6–8.3)
NEUTROPHILS NFR BLD AUTO: 78 %
NRBC # BLD AUTO: 0 10E3/UL
NRBC BLD AUTO-RTO: 0 /100
PLATELET # BLD AUTO: 226 10E3/UL (ref 150–450)
POTASSIUM BLD-SCNC: 3.8 MMOL/L (ref 3.5–5)
PROT SERPL-MCNC: 6.8 G/DL (ref 6–8)
RBC # BLD AUTO: 3.63 10E6/UL (ref 3.8–5.2)
SODIUM SERPL-SCNC: 137 MMOL/L (ref 136–145)
WBC # BLD AUTO: 5.2 10E3/UL (ref 4–11)

## 2021-09-02 PROCEDURE — 250N000011 HC RX IP 250 OP 636: Performed by: INTERNAL MEDICINE

## 2021-09-02 PROCEDURE — 96417 CHEMO IV INFUS EACH ADDL SEQ: CPT

## 2021-09-02 PROCEDURE — 258N000003 HC RX IP 258 OP 636: Performed by: INTERNAL MEDICINE

## 2021-09-02 PROCEDURE — 82040 ASSAY OF SERUM ALBUMIN: CPT | Performed by: INTERNAL MEDICINE

## 2021-09-02 PROCEDURE — 96413 CHEMO IV INFUSION 1 HR: CPT

## 2021-09-02 PROCEDURE — 36415 COLL VENOUS BLD VENIPUNCTURE: CPT

## 2021-09-02 PROCEDURE — 85025 COMPLETE CBC W/AUTO DIFF WBC: CPT | Performed by: INTERNAL MEDICINE

## 2021-09-02 PROCEDURE — 96367 TX/PROPH/DG ADDL SEQ IV INF: CPT

## 2021-09-02 PROCEDURE — 96375 TX/PRO/DX INJ NEW DRUG ADDON: CPT

## 2021-09-02 PROCEDURE — 99214 OFFICE O/P EST MOD 30 MIN: CPT | Performed by: INTERNAL MEDICINE

## 2021-09-02 PROCEDURE — G0463 HOSPITAL OUTPT CLINIC VISIT: HCPCS | Mod: 25

## 2021-09-02 RX ORDER — HEPARIN SODIUM,PORCINE 10 UNIT/ML
5 VIAL (ML) INTRAVENOUS
Status: CANCELLED | OUTPATIENT
Start: 2021-09-02

## 2021-09-02 RX ORDER — HEPARIN SODIUM,PORCINE 10 UNIT/ML
5 VIAL (ML) INTRAVENOUS
Status: DISCONTINUED | OUTPATIENT
Start: 2021-09-02 | End: 2021-09-02 | Stop reason: HOSPADM

## 2021-09-02 RX ORDER — METHYLPREDNISOLONE SODIUM SUCCINATE 125 MG/2ML
125 INJECTION, POWDER, LYOPHILIZED, FOR SOLUTION INTRAMUSCULAR; INTRAVENOUS
Status: CANCELLED
Start: 2021-09-02

## 2021-09-02 RX ORDER — EPINEPHRINE 1 MG/ML
0.3 INJECTION, SOLUTION INTRAMUSCULAR; SUBCUTANEOUS EVERY 5 MIN PRN
Status: CANCELLED | OUTPATIENT
Start: 2021-09-02

## 2021-09-02 RX ORDER — NALOXONE HYDROCHLORIDE 0.4 MG/ML
0.2 INJECTION, SOLUTION INTRAMUSCULAR; INTRAVENOUS; SUBCUTANEOUS
Status: CANCELLED | OUTPATIENT
Start: 2021-09-02

## 2021-09-02 RX ORDER — DIPHENHYDRAMINE HCL 50 MG
50 CAPSULE ORAL
Status: CANCELLED | OUTPATIENT
Start: 2021-09-02

## 2021-09-02 RX ORDER — ALBUTEROL SULFATE 0.83 MG/ML
2.5 SOLUTION RESPIRATORY (INHALATION)
Status: CANCELLED | OUTPATIENT
Start: 2021-09-02

## 2021-09-02 RX ORDER — ACETAMINOPHEN 325 MG/1
650 TABLET ORAL
Status: CANCELLED
Start: 2021-09-02

## 2021-09-02 RX ORDER — LORAZEPAM 2 MG/ML
0.5 INJECTION INTRAMUSCULAR EVERY 4 HOURS PRN
Status: CANCELLED
Start: 2021-09-02

## 2021-09-02 RX ORDER — PALONOSETRON 0.05 MG/ML
0.25 INJECTION, SOLUTION INTRAVENOUS ONCE
Status: COMPLETED | OUTPATIENT
Start: 2021-09-02 | End: 2021-09-02

## 2021-09-02 RX ORDER — PALONOSETRON 0.05 MG/ML
0.25 INJECTION, SOLUTION INTRAVENOUS ONCE
Status: CANCELLED
Start: 2021-09-02

## 2021-09-02 RX ORDER — MEPERIDINE HYDROCHLORIDE 50 MG/ML
25 INJECTION INTRAMUSCULAR; INTRAVENOUS; SUBCUTANEOUS EVERY 30 MIN PRN
Status: CANCELLED | OUTPATIENT
Start: 2021-09-02

## 2021-09-02 RX ORDER — HEPARIN SODIUM (PORCINE) LOCK FLUSH IV SOLN 100 UNIT/ML 100 UNIT/ML
5 SOLUTION INTRAVENOUS
Status: DISCONTINUED | OUTPATIENT
Start: 2021-09-02 | End: 2021-09-02 | Stop reason: HOSPADM

## 2021-09-02 RX ORDER — ALBUTEROL SULFATE 90 UG/1
1-2 AEROSOL, METERED RESPIRATORY (INHALATION)
Status: CANCELLED
Start: 2021-09-02

## 2021-09-02 RX ORDER — DIPHENHYDRAMINE HYDROCHLORIDE 50 MG/ML
50 INJECTION INTRAMUSCULAR; INTRAVENOUS
Status: CANCELLED
Start: 2021-09-02

## 2021-09-02 RX ORDER — HEPARIN SODIUM (PORCINE) LOCK FLUSH IV SOLN 100 UNIT/ML 100 UNIT/ML
5 SOLUTION INTRAVENOUS
Status: CANCELLED | OUTPATIENT
Start: 2021-09-02

## 2021-09-02 RX ADMIN — DOCETAXEL 118 MG: 160 INJECTION, SOLUTION INTRAVENOUS at 10:53

## 2021-09-02 RX ADMIN — FOSAPREPITANT: 150 INJECTION, POWDER, LYOPHILIZED, FOR SOLUTION INTRAVENOUS at 09:00

## 2021-09-02 RX ADMIN — TRASTUZUMAB 348 MG: 150 INJECTION, POWDER, LYOPHILIZED, FOR SOLUTION INTRAVENOUS at 10:12

## 2021-09-02 RX ADMIN — HEPARIN SODIUM (PORCINE) LOCK FLUSH IV SOLN 100 UNIT/ML 5 ML: 100 SOLUTION at 12:28

## 2021-09-02 RX ADMIN — PALONOSETRON 0.25 MG: 0.05 INJECTION, SOLUTION INTRAVENOUS at 09:24

## 2021-09-02 RX ADMIN — SODIUM CHLORIDE 250 ML: 9 INJECTION, SOLUTION INTRAVENOUS at 08:59

## 2021-09-02 RX ADMIN — PERTUZUMAB 420 MG: 30 INJECTION, SOLUTION, CONCENTRATE INTRAVENOUS at 09:36

## 2021-09-02 RX ADMIN — CARBOPLATIN 635 MG: 10 INJECTION, SOLUTION INTRAVENOUS at 11:54

## 2021-09-02 ASSESSMENT — MIFFLIN-ST. JEOR: SCORE: 1236.56

## 2021-09-02 NOTE — LETTER
"    9/2/2021         RE: Joanne Colon  [address]  [place] [address]        Dear Colleague,    Thank you for referring your patient, Joanne Colon, to the [facility]. Please see a copy of my visit note below.    Oncology Rooming Note    September 2, 2021 8:22 AM   Joanne Colon is a 36 year old female who presents for:    Chief Complaint   Patient presents with     Breast Cancer     Initial Vitals: /64 (BP Location: Left arm, Patient Position: Sitting, Cuff Size: Adult Regular)   Pulse 94   Temp 98.4  F (36.9  C) (Oral)   Resp 16   Ht 1.63 m (5' 4.17\")   Wt 56.9 kg (125 lb 6.4 oz)   SpO2 100%   BMI 21.41 kg/m   Estimated body mass index is 21.41 kg/m  as calculated from the following:    Height as of this encounter: 1.63 m (5' 4.17\").    Weight as of this encounter: 56.9 kg (125 lb 6.4 oz). Body surface area is 1.61 meters squared.  Data Unavailable Comment: Data Unavailable   No LMP recorded. (Menstrual status: Chemotherapy).  Allergies reviewed: Yes  Medications reviewed: Yes    Medications: Medication refills not needed today.  Pharmacy name entered into [employer]: [facility] #[id number] [address] [facility]  AT [facility]    Clinical concerns: No concerns today.       DERRICK KEMP                [facility] Hematology and Oncology Progress Note    Patient: Joanne Colon  MRN: [id number]  Date of Service: Sep 2, 2021         Reason for Visit    Chief Complaint   Patient presents with     Breast Cancer       Assessment and Plan    Cancer Staging  Malignant neoplasm of nipple of right breast in female, estrogen receptor positive (H)  Staging form: Breast, AJCC 8th Edition  - Clinical stage from 4/21/2021: Stage IB (cT2, cN1(f), cM0, G3, ER+, ID+, HER2+) - Signed by Chelly Sanchez MD on 8/17/2021      ECOG Performance    0 - Independent     Pain       #.  cT2 N1 M0 invasive ductal carcinoma, grade 3, triple positive.   Favorable " clinical response on exam.  I reviewed her labs.  She has mild anemia, normal WBC and platelets.  She has normal liver function and kidney function.  Her sacral wound is resolving.    Will complete last cycle of neoadjuvant chemotherapy today.   She is scheduled for bilateral mastectomy and right sentinel lymph node biopsy on 10/6/2021.  They have the question again about whether she should proceed with bilateral mastectomy or lumpectomy.  I repeated again that overall survival from the breast cancer is the same either of these approaches.  Genetic testing pending.   Return to clinic in 3 weeks for trastuzumab and pertuzumab infusion appointment and labs.   Follow-up with me in 6 weeks after surgery to review pathology result and finalize adjuvant treatment plan.    #.  An open sore over the sacrum without drainage, improving compared to a week ago.   She will complete doxycycline for 10-day course.  Wound care consult and plan was reviewed.  She will continue with topical antibiotic as well.  Follow-up with wound care as needed.     #.  Mild to moderate diarrhea, secondary to chemotherapy.   Continue with supportive care and Imodium.  If needed, we can add Lomotil.      Encounter Diagnoses:    Problem List Items Addressed This Visit        Oncology Diagnoses    Malignant neoplasm of nipple of right breast in female, estrogen receptor positive (H) - Primary       Other    Mucositis due to chemotherapy    Chemotherapy-induced neutropenia (H)             CC: Ye Lira MD   ______________________________________________________________________________  Diagnosis  4/2021-presented to the emergency room with a mass in her right breast along with pain for about 3 months or so.  Denies nipple changes or discharge.  Underwent diagnostic mammogram and ultrasound on 4/13/2021 and it confirmed right breast mass in the 8 o'clock position, 5 cm from the nipple of 2.2 x 2.8 x 1.4 cm with abnormal appearing right axillary  lymph node of 1.7 cm with cortical thickness.  She underwent ultrasound-guided core needle biopsy on the same day and it showed invasive ductal carcinoma, grade 3, ER moderate to strong positive, CO strongly positive, HER-2 positive by IHC.              - She met with Dr. Perdomo from surgery and recommended to consider neoadjuvant chemotherapy to HER-2 positive lymph node positive disease.     LMP- 5/10/2021     Treatment to date  2021-initiated neoadjuvant TCHP.  Required hospitalization with neutropenic fever, neutropenic colitis after cycle #1.  Neulasta added starting cycle #2 chemotherapy.    History of Present Illness    Ms. Joanne Colon presented today accompanied by her sister.    She is doing well.  Pain in her buttock has improved.  She has some fatigue.  She has body aches after chemotherapy.  She is feeling okay to receive chemotherapy today.    Review of systems  Apart from describing in HPI, the remainder of comprehensive ROS was negative.    Past History    Past Medical History:   Diagnosis Date     Breast cancer (H)      Breast mass, right 2021    Dx 2021      Chemotherapy induced nausea and vomiting 2021     Chemotherapy-induced neutropenia (H)      Colitis, acute      Encounter for antineoplastic chemotherapy 2021     Fever and chills 2021     Functional diarrhea 2021     Hepatitis      Lesion of liver less than 1 cm in diameter      Malignant neoplasm of nipple of right breast in female, estrogen receptor positive (H) 2021     Mucositis due to chemotherapy      Neutropenic fever (H)      Neutropenic sepsis (H) 2021       Past Surgical History:   Procedure Laterality Date      SECTION      x two     IR CHEST PORT PLACEMENT > 5 YRS OF AGE  2021     IR PORT PLACEMENT >5 YEARS  2021     US BIOPSY FINE NEEDLE ASPIRATION LYMPH NODE (BREAST) RIGHT Right 2021     US BREAST CORE BIOPSY RIGHT Right 2021         Physical Exam    BP  "110/64 (BP Location: Left arm, Patient Position: Sitting, Cuff Size: Adult Regular)   Pulse 94   Temp 98.4  F (36.9  C) (Oral)   Resp 16   Ht 1.63 m (5' 4.17\")   Wt 56.9 kg (125 lb 6.4 oz)   SpO2 100%   BMI 21.41 kg/m        General: alert, awake, not in acute distress  HEENT: Head: Normal, normocephalic, atraumatic.  Eye: Normal external eye, conjunctiva, lids cornea, LISSETH.  Nose: Normal external nose, mucus membranes and septum.  Pharynx: Normal buccal mucosa. Normal pharynx.  Neck / Thyroid: Supple, no masses, nodes, nodules or enlargement.  Lymphatics: No abnormally enlarged lymph nodes.  Chest: Normal chest wall and respirations. Clear to auscultation.  Breasts: No discrete palpable mass in the right breast or axilla.  Heart: S1 S2 RRR, no murmur.   Abdomen: abdomen is soft without significant tenderness, masses, organomegaly or guarding  Extremities: normal strength, tone, and muscle mass  Skin: normal. no rash or abnormalities  CNS: non focal.    Lab Results    Recent Results (from the past 168 hour(s))   Comprehensive metabolic panel   Result Value Ref Range    Sodium 137 136 - 145 mmol/L    Potassium 3.8 3.5 - 5.0 mmol/L    Chloride 102 98 - 107 mmol/L    Carbon Dioxide (CO2) 23 22 - 31 mmol/L    Anion Gap 12 5 - 18 mmol/L    Urea Nitrogen 13 8 - 22 mg/dL    Creatinine 0.67 0.60 - 1.10 mg/dL    Calcium 9.8 8.5 - 10.5 mg/dL    Glucose 163 (H) 70 - 125 mg/dL    Alkaline Phosphatase 63 45 - 120 U/L    AST 17 0 - 40 U/L    ALT 17 0 - 45 U/L    Protein Total 6.8 6.0 - 8.0 g/dL    Albumin 3.9 3.5 - 5.0 g/dL    Bilirubin Total 0.3 0.0 - 1.0 mg/dL    GFR Estimate >90 >60 mL/min/1.73m2   CBC with platelets and differential   Result Value Ref Range    WBC Count 5.2 4.0 - 11.0 10e3/uL    RBC Count 3.63 (L) 3.80 - 5.20 10e6/uL    Hemoglobin 11.5 (L) 11.7 - 15.7 g/dL    Hematocrit 35.0 35.0 - 47.0 %    MCV 96 78 - 100 fL    MCH 31.7 26.5 - 33.0 pg    MCHC 32.9 31.5 - 36.5 g/dL    RDW 13.7 10.0 - 15.0 %    " Platelet Count 226 150 - 450 10e3/uL    % Neutrophils 78 %    % Lymphocytes 20 %    % Monocytes 2 %    % Eosinophils 0 %    % Basophils 0 %    % Immature Granulocytes 0 %    NRBCs per 100 WBC 0 <1 /100    Absolute Neutrophils 4.1 1.6 - 8.3 10e3/uL    Absolute Lymphocytes 1.0 0.8 - 5.3 10e3/uL    Absolute Monocytes 0.1 0.0 - 1.3 10e3/uL    Absolute Eosinophils 0.0 0.0 - 0.7 10e3/uL    Absolute Basophils 0.0 0.0 - 0.2 10e3/uL    Absolute Immature Granulocytes 0.0 <=0.0 10e3/uL    Absolute NRBCs 0.0 10e3/uL       Imaging    No results found.    Signed by: Chelly Sanchez MD      Again, thank you for allowing me to participate in the care of your patient.        Sincerely,        Chelly Sanchez MD

## 2021-09-02 NOTE — PROGRESS NOTES
[facility] Hematology and Oncology Progress Note    Patient: Joanne Colon  MRN: [id number]  Date of Service: Sep 2, 2021         Reason for Visit    Chief Complaint   Patient presents with     Breast Cancer       Assessment and Plan    Cancer Staging  Malignant neoplasm of nipple of right breast in female, estrogen receptor positive (H)  Staging form: Breast, AJCC 8th Edition  - Clinical stage from 4/21/2021: Stage IB (cT2, cN1(f), cM0, G3, ER+, MA+, HER2+) - Signed by Chelly Sanchez MD on 8/17/2021      ECOG Performance    0 - Independent     Pain       #.  cT2 N1 M0 invasive ductal carcinoma, grade 3, triple positive.   Favorable clinical response on exam.  I reviewed her labs.  She has mild anemia, normal WBC and platelets.  She has normal liver function and kidney function.  Her sacral wound is resolving.    Will complete last cycle of neoadjuvant chemotherapy today.   She is scheduled for bilateral mastectomy and right sentinel lymph node biopsy on 10/6/2021.  They have the question again about whether she should proceed with bilateral mastectomy or lumpectomy.  I repeated again that overall survival from the breast cancer is the same either of these approaches.  Genetic testing pending.   Return to clinic in 3 weeks for trastuzumab and pertuzumab infusion appointment and labs.   Follow-up with me in 6 weeks after surgery to review pathology result and finalize adjuvant treatment plan.    #.  An open sore over the sacrum without drainage, improving compared to a week ago.   She will complete doxycycline for 10-day course.  Wound care consult and plan was reviewed.  She will continue with topical antibiotic as well.  Follow-up with wound care as needed.     #.  Mild to moderate diarrhea, secondary to chemotherapy.   Continue with supportive care and Imodium.  If needed, we can add Lomotil.      Encounter Diagnoses:    Problem List Items Addressed This Visit        Oncology Diagnoses    Malignant  neoplasm of nipple of right breast in female, estrogen receptor positive (H) - Primary       Other    Mucositis due to chemotherapy    Chemotherapy-induced neutropenia (H)             CC: Ye Lira MD   ______________________________________________________________________________  Diagnosis  4/2021-presented to the emergency room with a mass in her right breast along with pain for about 3 months or so.  Denies nipple changes or discharge.  Underwent diagnostic mammogram and ultrasound on 4/13/2021 and it confirmed right breast mass in the 8 o'clock position, 5 cm from the nipple of 2.2 x 2.8 x 1.4 cm with abnormal appearing right axillary lymph node of 1.7 cm with cortical thickness.  She underwent ultrasound-guided core needle biopsy on the same day and it showed invasive ductal carcinoma, grade 3, ER moderate to strong positive, CO strongly positive, HER-2 positive by IHC.              - She met with Dr. Perdomo from surgery and recommended to consider neoadjuvant chemotherapy to HER-2 positive lymph node positive disease.     LMP- 5/10/2021     Treatment to date  5/13/2021-initiated neoadjuvant TCHP.  Required hospitalization with neutropenic fever, neutropenic colitis after cycle #1.  Neulasta added starting cycle #2 chemotherapy.    History of Present Illness    Ms. Joanne Colon presented today accompanied by her sister.    She is doing well.  Pain in her buttock has improved.  She has some fatigue.  She has body aches after chemotherapy.  She is feeling okay to receive chemotherapy today.    Review of systems  Apart from describing in HPI, the remainder of comprehensive ROS was negative.    Past History    Past Medical History:   Diagnosis Date     Breast cancer (H)      Breast mass, right 4/9/2021    Dx April 2021      Chemotherapy induced nausea and vomiting 7/18/2021     Chemotherapy-induced neutropenia (H)      Colitis, acute      Encounter for antineoplastic chemotherapy 4/22/2021     Fever and  "chills 2021     Functional diarrhea 2021     Hepatitis      Lesion of liver less than 1 cm in diameter      Malignant neoplasm of nipple of right breast in female, estrogen receptor positive (H) 2021     Mucositis due to chemotherapy      Neutropenic fever (H)      Neutropenic sepsis (H) 2021       Past Surgical History:   Procedure Laterality Date      SECTION      x two     IR CHEST PORT PLACEMENT > 5 YRS OF AGE  2021     IR PORT PLACEMENT >5 YEARS  2021     US BIOPSY FINE NEEDLE ASPIRATION LYMPH NODE (BREAST) RIGHT Right 2021     US BREAST CORE BIOPSY RIGHT Right 2021         Physical Exam    /64 (BP Location: Left arm, Patient Position: Sitting, Cuff Size: Adult Regular)   Pulse 94   Temp 98.4  F (36.9  C) (Oral)   Resp 16   Ht 1.63 m (5' 4.17\")   Wt 56.9 kg (125 lb 6.4 oz)   SpO2 100%   BMI 21.41 kg/m        General: alert, awake, not in acute distress  HEENT: Head: Normal, normocephalic, atraumatic.  Eye: Normal external eye, conjunctiva, lids cornea, LISSETH.  Nose: Normal external nose, mucus membranes and septum.  Pharynx: Normal buccal mucosa. Normal pharynx.  Neck / Thyroid: Supple, no masses, nodes, nodules or enlargement.  Lymphatics: No abnormally enlarged lymph nodes.  Chest: Normal chest wall and respirations. Clear to auscultation.  Breasts: No discrete palpable mass in the right breast or axilla.  Heart: S1 S2 RRR, no murmur.   Abdomen: abdomen is soft without significant tenderness, masses, organomegaly or guarding  Extremities: normal strength, tone, and muscle mass  Skin: normal. no rash or abnormalities  CNS: non focal.    Lab Results    Recent Results (from the past 168 hour(s))   Comprehensive metabolic panel   Result Value Ref Range    Sodium 137 136 - 145 mmol/L    Potassium 3.8 3.5 - 5.0 mmol/L    Chloride 102 98 - 107 mmol/L    Carbon Dioxide (CO2) 23 22 - 31 mmol/L    Anion Gap 12 5 - 18 mmol/L    Urea Nitrogen 13 8 - 22 mg/dL    " Creatinine 0.67 0.60 - 1.10 mg/dL    Calcium 9.8 8.5 - 10.5 mg/dL    Glucose 163 (H) 70 - 125 mg/dL    Alkaline Phosphatase 63 45 - 120 U/L    AST 17 0 - 40 U/L    ALT 17 0 - 45 U/L    Protein Total 6.8 6.0 - 8.0 g/dL    Albumin 3.9 3.5 - 5.0 g/dL    Bilirubin Total 0.3 0.0 - 1.0 mg/dL    GFR Estimate >90 >60 mL/min/1.73m2   CBC with platelets and differential   Result Value Ref Range    WBC Count 5.2 4.0 - 11.0 10e3/uL    RBC Count 3.63 (L) 3.80 - 5.20 10e6/uL    Hemoglobin 11.5 (L) 11.7 - 15.7 g/dL    Hematocrit 35.0 35.0 - 47.0 %    MCV 96 78 - 100 fL    MCH 31.7 26.5 - 33.0 pg    MCHC 32.9 31.5 - 36.5 g/dL    RDW 13.7 10.0 - 15.0 %    Platelet Count 226 150 - 450 10e3/uL    % Neutrophils 78 %    % Lymphocytes 20 %    % Monocytes 2 %    % Eosinophils 0 %    % Basophils 0 %    % Immature Granulocytes 0 %    NRBCs per 100 WBC 0 <1 /100    Absolute Neutrophils 4.1 1.6 - 8.3 10e3/uL    Absolute Lymphocytes 1.0 0.8 - 5.3 10e3/uL    Absolute Monocytes 0.1 0.0 - 1.3 10e3/uL    Absolute Eosinophils 0.0 0.0 - 0.7 10e3/uL    Absolute Basophils 0.0 0.0 - 0.2 10e3/uL    Absolute Immature Granulocytes 0.0 <=0.0 10e3/uL    Absolute NRBCs 0.0 10e3/uL       Imaging    No results found.    Signed by: Chelly Sanchez MD

## 2021-09-02 NOTE — PROGRESS NOTES
"Oncology Rooming Note    September 2, 2021 8:22 AM   Joanne Colon is a 36 year old female who presents for:    Chief Complaint   Patient presents with     Breast Cancer     Initial Vitals: /64 (BP Location: Left arm, Patient Position: Sitting, Cuff Size: Adult Regular)   Pulse 94   Temp 98.4  F (36.9  C) (Oral)   Resp 16   Ht 1.63 m (5' 4.17\")   Wt 56.9 kg (125 lb 6.4 oz)   SpO2 100%   BMI 21.41 kg/m   Estimated body mass index is 21.41 kg/m  as calculated from the following:    Height as of this encounter: 1.63 m (5' 4.17\").    Weight as of this encounter: 56.9 kg (125 lb 6.4 oz). Body surface area is 1.61 meters squared.  Data Unavailable Comment: Data Unavailable   No LMP recorded. (Menstrual status: Chemotherapy).  Allergies reviewed: Yes  Medications reviewed: Yes    Medications: Medication refills not needed today.  Pharmacy name entered into [employer]: [id number] [address] AT [facility]    Clinical concerns: No concerns today.       DERRICK KEMP              "

## 2021-09-02 NOTE — TELEPHONE ENCOUNTER
Per Dr. Perdomo, we can schedule Joanne 3 weeks after her last chemo. Dr. Perdomo is unavailable the last week in September which will push Joanne's surgery to the week after. Scheduled for Wednesday Oct. 6th and mailed surgery details to the patient.    Ramandeep VENCES  Surgery Scheduler  [facility]  Surgery [facility]  P. [phone]  F. [phone]

## 2021-09-02 NOTE — PROGRESS NOTES
Pt here today for treatment following MD visit. She tolerated infusion well and will RTC tomorrow at 1400 for neulasta injection.

## 2021-09-03 ENCOUNTER — INFUSION THERAPY VISIT (OUTPATIENT)
Dept: INFUSION THERAPY | Facility: CLINIC | Age: 36
End: 2021-09-03
Payer: MEDICAID

## 2021-09-03 DIAGNOSIS — Z17.0 MALIGNANT NEOPLASM OF NIPPLE OF RIGHT BREAST IN FEMALE, ESTROGEN RECEPTOR POSITIVE (H): Primary | ICD-10-CM

## 2021-09-03 DIAGNOSIS — Z51.11 ENCOUNTER FOR ANTINEOPLASTIC CHEMOTHERAPY: ICD-10-CM

## 2021-09-03 DIAGNOSIS — C50.011 MALIGNANT NEOPLASM OF NIPPLE OF RIGHT BREAST IN FEMALE, ESTROGEN RECEPTOR POSITIVE (H): Primary | ICD-10-CM

## 2021-09-03 PROCEDURE — 250N000011 HC RX IP 250 OP 636: Performed by: INTERNAL MEDICINE

## 2021-09-03 PROCEDURE — 96372 THER/PROPH/DIAG INJ SC/IM: CPT | Performed by: INTERNAL MEDICINE

## 2021-09-03 RX ADMIN — PEGFILGRASTIM-CBQV 6 MG: 6 INJECTION, SOLUTION SUBCUTANEOUS at 14:02

## 2021-09-06 DIAGNOSIS — Z11.59 ENCOUNTER FOR SCREENING FOR OTHER VIRAL DISEASES: ICD-10-CM

## 2021-09-09 ENCOUNTER — TELEPHONE (OUTPATIENT)
Dept: ONCOLOGY | Facility: CLINIC | Age: 36
End: 2021-09-09

## 2021-09-09 NOTE — TELEPHONE ENCOUNTER
Joanne's sister called to relay that Joanne's pain has increased in her rectal wound and she is now having trouble with walking due to the pain in her wound. Since she left message on the triage line earlier today, she called the wound care clinic and they were able to get her scheduled for a recheck tomorrow, 9/10. They are comfortable with the plan, No further needs today.Kira Ham RN

## 2021-09-10 ENCOUNTER — OFFICE VISIT (OUTPATIENT)
Dept: VASCULAR SURGERY | Facility: CLINIC | Age: 36
End: 2021-09-10
Attending: NURSE PRACTITIONER
Payer: MEDICAID

## 2021-09-10 VITALS
DIASTOLIC BLOOD PRESSURE: 68 MMHG | SYSTOLIC BLOOD PRESSURE: 112 MMHG | TEMPERATURE: 98.2 F | RESPIRATION RATE: 16 BRPM | HEART RATE: 84 BPM

## 2021-09-10 DIAGNOSIS — K60.2 ANAL FISSURE: ICD-10-CM

## 2021-09-10 DIAGNOSIS — S31.000D WOUND OF SACRAL REGION, SUBSEQUENT ENCOUNTER: Primary | ICD-10-CM

## 2021-09-10 PROCEDURE — 17250 CHEM CAUT OF GRANLTJ TISSUE: CPT | Performed by: NURSE PRACTITIONER

## 2021-09-10 ASSESSMENT — PAIN SCALES - GENERAL: PAINLEVEL: EXTREME PAIN (9)

## 2021-09-10 NOTE — PATIENT INSTRUCTIONS
Your wound was treated with silver nitrate today this can cause burning pain; take acetaminophen 500-1000mg every 6 hours for pain    Apply the Triad paste 2-3 times per day to the rectal wound  Use pea sized amount  No dressings needed  Keep the area clean; ok to shower; use tap water to cleanse the area gently  Do not aggressively pull apart the buttocks or scrub the area  Can use sanitary napkin/pad in the underwear to protect clothing  Ok to use hair dryer on cool setting to dry the area

## 2021-09-10 NOTE — PROGRESS NOTES
Follow up Vascular Visit       Date of Service:09/10/21      Chief Complaint: perineal wound      Pt returns to [facility] with regards to their perineal wound.  They arrive today with sister who is interpreting for her today; she declined an . They are currently using gentamycin ointment to the wounds. This is being done by the patient 3 times per day. They are feeling well today. Denies fevers, chills. No shortness of breath. She was diagnosed with breast cancer 6 months ago; she is undergoing chemotherapy/radiation; and will expect to have surgery after chemotherapy is completed; she developed diarrhea from the chemotherapy which caused the wound on the perineum. She finished her last course of chemotherapy 8 days ago and expects the diarrhea to last another 2-4 days.     Allergies: No Known Allergies    Medications:   Current Outpatient Medications:      acetaminophen (TYLENOL) 500 MG tablet, Take 500 mg by mouth every 6 hours as needed for mild pain , Disp: , Rfl:      calcium carbonate (TUMS) 500 MG chewable tablet, Take 1 chew tab by mouth 2 times daily as needed for heartburn , Disp: , Rfl:      dexamethasone (DECADRON) 4 MG tablet, Take 4 mg by mouth 2 times daily (with meals) Taking 8mg every 12hours for 3 days starting the day before chemo, Disp: , Rfl:      gentamicin (GARAMYCIN) 0.1 % external ointment, Apply topically 3 times daily Apply 0.25gram to buttocks wound 3 times per day for 14 days, Disp: 30 g, Rfl: 1     gentamicin (GARAMYCIN) 0.1 % external ointment, Apply topically 3 times daily Apply topically 3 times daily Apply 0.25gram to buttocks wound 3 times per day for 14 days - Topical, Disp: 15 g, Rfl: 0     loperamide (IMODIUM) 2 MG capsule, Take 2 mg by mouth 4 times daily as needed for diarrhea , Disp: , Rfl:      magic mouthwash suspension (diphenhydrAMINE, lidocaine, aluminum-magnesium & simethicone), Swish and swallow 10 mLs in mouth every 6 hours as  needed for mouth sores , Disp: , Rfl:      ondansetron (ZOFRAN) 4 MG tablet, Take 1 tablet (4 mg) by mouth every 8 hours as needed for nausea, Disp: 20 tablet, Rfl: 1     prochlorperazine (COMPAZINE) 10 MG tablet, Take 10 mg by mouth , Disp: , Rfl:      study - lidocaine/prilocaine, IDS# [id number], 2.5-2.5 % external cream, Apply topically as needed for moderate pain Apply topically as directed prior to blood draw., Disp: , Rfl:     Current Facility-Administered Medications:      lidocaine (XYLOCAINE) 2 % external gel, , Topical, Daily PRN, Alee Levine, NP, Given at 08/30/21 1414    History:   Past Medical History:   Diagnosis Date     Breast cancer (H)      Breast mass, right 4/9/2021    Dx April 2021      Chemotherapy induced nausea and vomiting 7/18/2021     Chemotherapy-induced neutropenia (H)      Colitis, acute      Encounter for antineoplastic chemotherapy 4/22/2021     Fever and chills 5/22/2021     Functional diarrhea 7/18/2021     Hepatitis      Lesion of liver less than 1 cm in diameter      Malignant neoplasm of nipple of right breast in female, estrogen receptor positive (H) 4/22/2021     Mucositis due to chemotherapy      Neutropenic fever (H)      Neutropenic sepsis (H) 5/23/2021       Physical Exam:    /68   Pulse 84   Temp 98.2  F (36.8  C)   Resp 16     General:  Patient presents to clinic in no apparent distress.  Head: normocephalic atraumatic  Psychiatric:  Alert and oriented x3.   Respiratory: unlabored breathing; no cough  Integumentary:  Skin is uniformly warm, dry and pink.    Wound #1 Location: perineum  Size: 1L x 0.5W x 0.1depth.  No sinus tract present, Wound base: hypergranular; poor quality  No undermining present. Wound is full thickness. There is moderate drainage. Periwound: no denudement, erythema, induration, maceration or warmth.      Port A Cath Single 04/28/21 Left Chest wall (Active)   Site Assessment WDL 09/02/21 0810   Dressing Status intact;dry;clean 09/02/21 0810    Dressing Intervention Transparent;New dressing;Gauze 09/02/21 0810   Line Status Blood return noted 09/02/21 1052   Access Date 09/02/21 09/02/21 0810   Access Attempts 1 09/02/21 0810   Gauge Noncoring 90 degree bend;20 gauge;3/4 inch 09/02/21 0810   Line Necessity Yes, meets criteria 09/02/21 0810   Number of days: 135       VASC Wound sacrum (Active)   Pre Size Length 1 08/30/21 1300   Pre Size Width 0.5 08/30/21 1300   Pre Size Depth 0.1 08/30/21 1300   Pre Total Sq cm 0.5 08/30/21 1300   Number of days: 11            Circumferential volume measures:      No flowsheet data found.    Labs:    I personally reviewed the following lab results today and those on care everywhere, if indicated     CRP   Date Value Ref Range Status   04/01/2021 <0.1 0.0 - 0.8 mg/dL Final      No results found for: SED   Last Renal Panel:  Sodium   Date Value Ref Range Status   09/02/2021 137 136 - 145 mmol/L Final     Potassium   Date Value Ref Range Status   09/02/2021 3.8 3.5 - 5.0 mmol/L Final     Chloride   Date Value Ref Range Status   09/02/2021 102 98 - 107 mmol/L Final     Carbon Dioxide (CO2)   Date Value Ref Range Status   09/02/2021 23 22 - 31 mmol/L Final     Anion Gap   Date Value Ref Range Status   09/02/2021 12 5 - 18 mmol/L Final     Glucose   Date Value Ref Range Status   09/02/2021 163 (H) 70 - 125 mg/dL Final     Urea Nitrogen   Date Value Ref Range Status   09/02/2021 13 8 - 22 mg/dL Final     Creatinine   Date Value Ref Range Status   09/02/2021 0.67 0.60 - 1.10 mg/dL Final     GFR Estimate   Date Value Ref Range Status   09/02/2021 >90 >60 mL/min/1.73m2 Final     Comment:     As of July 11, 2021, eGFR is calculated by the CKD-EPI creatinine equation, without race adjustment. eGFR can be influenced by muscle mass, exercise, and diet. The reported eGFR is an estimation only and is only applicable if the renal function is stable.   06/24/2021 >60 >60 mL/min/1.73m2 Final     Calcium   Date Value Ref Range Status    09/02/2021 9.8 8.5 - 10.5 mg/dL Final     Albumin   Date Value Ref Range Status   09/02/2021 3.9 3.5 - 5.0 g/dL Final      Lab Results   Component Value Date    WBC 5.2 09/02/2021     Lab Results   Component Value Date    RBC 3.63 09/02/2021     Lab Results   Component Value Date    HGB 11.5 09/02/2021     Lab Results   Component Value Date    HCT 35.0 09/02/2021     No components found for: MCT  Lab Results   Component Value Date    MCV 96 09/02/2021     Lab Results   Component Value Date    MCH 31.7 09/02/2021     Lab Results   Component Value Date    MCHC 32.9 09/02/2021     Lab Results   Component Value Date    RDW 13.7 09/02/2021     Lab Results   Component Value Date     09/02/2021      No results found for: A1C   No results found for: TSH   No results found for: VITDT                Impression:  Encounter Diagnoses   Name Primary?     Wound of sacral region, subsequent encounter Yes     Anal fissure                    Are any of these wounds new today: No; Location: na    Assessment/Plan:          1. Debridement: treated with silver nitrate     2.  Wound treatment: wound treatment will include irrigation and dressings to promote autolytic debridement which will include:will stop the gentamycin ointment as the green drainage has subsided; will go to Triad paste apply tid no dressing; we spoke about ways to keep the area clean and dry; using a sprayer after using the bathroom; using hair dryer on cool setting; once the diarrhea stops the wound will have a better chance to heal Stable            3. Edema: na.            4. Nutrition: focus on protein           5. Offloading: reposition frequently     Patient will follow up with me in 2 weeks for reevaluation. They were instructed to call the clinic sooner with any signs or symptoms of infection or any further questions/concerns. Answered all questions.          Alee Levine DNP, RN, CNP, CWOCN, CFCN, CLT  [facility]    [phone]        This note was electronically signed by Alee Levine NP

## 2021-09-20 ENCOUNTER — TELEPHONE (OUTPATIENT)
Dept: ONCOLOGY | Facility: CLINIC | Age: 36
End: 2021-09-20
Payer: MEDICAID

## 2021-09-20 DIAGNOSIS — Z17.0 MALIGNANT NEOPLASM OF UPPER-INNER QUADRANT OF RIGHT BREAST IN FEMALE, ESTROGEN RECEPTOR POSITIVE (H): Primary | ICD-10-CM

## 2021-09-20 DIAGNOSIS — Z80.3 FAMILY HISTORY OF MALIGNANT NEOPLASM OF BREAST: ICD-10-CM

## 2021-09-20 DIAGNOSIS — C50.211 MALIGNANT NEOPLASM OF UPPER-INNER QUADRANT OF RIGHT BREAST IN FEMALE, ESTROGEN RECEPTOR POSITIVE (H): Primary | ICD-10-CM

## 2021-09-20 LAB
Lab: NORMAL
PERFORMING LABORATORY: NORMAL
SCANNED LAB RESULT: NORMAL
SPECIMEN STATUS: NORMAL
TEST NAME: NORMAL

## 2021-09-20 NOTE — TELEPHONE ENCOUNTER
"Referring Provider: Dr. Sanchez    Presenting Information:  I spoke to Joanne by phone via Thai  today to discuss her genetic testing results. Her blood was drawn on 9/2/2021. The Breast and Gyn Cancers panel test was ordered from [facility]. This testing was done because of Joanne's personal and family history of breast. We discussed that we initially planned on reviewing her BR[facility] STAT results first. However, I explained that in some cases, the laboratory reports the full results out with the STAT results if they are available. This was the case for Joanne's results, so full results were reviewed today.     Genetic Testing Result: NEGATIVE  Joanne is negative for mutations in the CARLI, BARD1, BRCA1, BRCA2, BRIP1, CDH1, CHEK2, DICER1, EPCAM, MLH1, MSH2, MSH6, NBN, NF1, PALB2, PMS2, PTEN, RAD50, RAD51C, RAD51D, SMARCA4, STK11, and TP53 genes. This test involved sequencing and deletion/duplication analysis of all genes with the exceptions of EPCAM (deletions/duplications only). Preliminary breast and gyn cancers genes were also analyzed as they were added to the test request in error by the lab. No mutations were found in preliminary evidence genes; ABRAXAS1, AKT1, CDC73, FANCC, FANCM, MRE11, MUTYH, PIK3CA, POLD1, RECQL, RINT1, SDHB, SDHD, XRCC2. No mutations were found in any of the 37 genes analyzed.     A copy of the test report can be found in the Laboratory tab, dated 9/18/2021, and named \"LABORATORY MISCELLANEOUS ORDER\". The report is scanned in as a linked document.    Interpretation:  We discussed several different interpretations of this negative test result.    1. One explanation may be that there is a different gene or combination of genes and environment that are associated with the cancers in this family.  2. It is possible that her maternal aunt with breast cancer history did have a mutation in one of the genes Joanne was tested for, and she did not inherit it.  3. There is also a small possibility " that there is a mutation in one of these genes, and the testing laboratory could not find it with their current testing methods.       Screening:  Based on this negative test result, it is important for Joanne and her relatives to refer back to the family history for appropriate cancer screening.      Joanne should continue to follow her oncology team's recommendations for the treatment and follow-up for her breast cancer history.     Joanne s close female relatives remain at increased risk for breast cancer given their family history. Breast cancer screening is generally recommended to begin approximately 10 years younger than the earliest age of breast cancer diagnosis in the family, or at age 40, whichever comes first. In this family, screening may begin at age 28. Breast screening options should be discussed with an individual's primary care provider and a genetic counselor, to determine at what age to begin screening, what screening is appropriate, and if additional screening (such as breast MRI) is necessary based on personal/family history factors. This would apply to Joanne's sisters, daughters, and nieces.     Other population cancer screening options, such as those recommended by the American Cancer Society and the National Comprehensive Cancer Network (NCCN), are also appropriate for Joanne and her family. These screening recommendations may change if there are changes to Joanne's personal and/or family history. Final screening recommendations should be made by each individual's managing physician.      Inheritance:  We reviewed the autosomal dominant inheritance of mutations in these 37 genes. We discussed that Joanne cannot/did not pass on an identifiable mutation in these genes to her children based on this test result.  Mutations in these genes do not skip generations.      Additional Testing Considerations:  No further genetic is recommended for Joanne or her family at this time. Joanne was encouraged to  contact me should her personal or family history change as this may change genetic testing recommendations.     Summary:  We do not have an explanation for Joanne's personal and family history of cancer. While no genetic changes were identified, Joanne may still be at risk for certain cancers due to family history, environmental factors, or other genetic causes not identified by this test.  Because of that, it is important that she continue with cancer screening based on her personal and family history as discussed above.    Genetic testing is rapidly advancing, and new cancer susceptibility genes will most likely be identified in the future.  Therefore, I encouraged Joanne to contact me annually or if there are changes in her personal or family history.  This may change how we assess her cancer risk, screening, and the testing we would offer.    Plan:  1. A copy of the test results will be mailed to Joanne.  2. She plans to follow-up with Dr. Sanchez and her oncology team and her primary care providers for routine care needs.  3. She should contact me annually, or sooner if her family history changes.    If Joanne has any further questions, I encouraged her to contact me at [phone].    Time spent on the phone: 10 minutes.    Brittney Huggins MS, [facility]  Licensed, Certified Genetic Counselor  [facility]  [phone]  [email or web address]

## 2021-09-22 ENCOUNTER — TELEPHONE (OUTPATIENT)
Dept: ONCOLOGY | Facility: CLINIC | Age: 36
End: 2021-09-22

## 2021-09-22 ENCOUNTER — OFFICE VISIT (OUTPATIENT)
Dept: VASCULAR SURGERY | Facility: CLINIC | Age: 36
End: 2021-09-22
Attending: NURSE PRACTITIONER
Payer: MEDICAID

## 2021-09-22 ENCOUNTER — TELEPHONE (OUTPATIENT)
Dept: VASCULAR SURGERY | Facility: CLINIC | Age: 36
End: 2021-09-22

## 2021-09-22 VITALS
SYSTOLIC BLOOD PRESSURE: 108 MMHG | TEMPERATURE: 98.3 F | DIASTOLIC BLOOD PRESSURE: 66 MMHG | RESPIRATION RATE: 16 BRPM | HEART RATE: 80 BPM

## 2021-09-22 DIAGNOSIS — K63.2 COLOCUTANEOUS FISTULA: ICD-10-CM

## 2021-09-22 DIAGNOSIS — K59.1 FUNCTIONAL DIARRHEA: ICD-10-CM

## 2021-09-22 DIAGNOSIS — K60.2 ANAL FISSURE: Primary | ICD-10-CM

## 2021-09-22 PROCEDURE — G0463 HOSPITAL OUTPT CLINIC VISIT: HCPCS | Performed by: NURSE PRACTITIONER

## 2021-09-22 PROCEDURE — 99213 OFFICE O/P EST LOW 20 MIN: CPT | Performed by: NURSE PRACTITIONER

## 2021-09-22 ASSESSMENT — PAIN SCALES - GENERAL: PAINLEVEL: EXTREME PAIN (8)

## 2021-09-22 NOTE — PROGRESS NOTES
Follow up Vascular Visit       Date of Service:09/22/21      Chief Complaint: rectal fissure      Pt returns to [facility] with regards to their rectal fissue.  They arrive today with sister; who interprets for her. They are currently using triad or gentamycin ointment to the wounds. This is being done by the patient 3 times per day. She has completed her chemotherapy which was giving her severe diarrhea; this has now subsided approx 5 days ago. The pain has improved in the rectal area but she is having more yellow brown mucoid drainage which smells of stool coming from the wound.  They are feeling well today. Denies fevers, chills. No shortness of breath.     Allergies: No Known Allergies    Medications:   Current Outpatient Medications:      acetaminophen (TYLENOL) 500 MG tablet, Take 500 mg by mouth every 6 hours as needed for mild pain , Disp: , Rfl:      calcium carbonate (TUMS) 500 MG chewable tablet, Take 1 chew tab by mouth 2 times daily as needed for heartburn , Disp: , Rfl:      dexamethasone (DECADRON) 4 MG tablet, Take 4 mg by mouth 2 times daily (with meals) Taking 8mg every 12hours for 3 days starting the day before chemo, Disp: , Rfl:      gentamicin (GARAMYCIN) 0.1 % external ointment, Apply topically 3 times daily Apply 0.25gram to buttocks wound 3 times per day for 14 days, Disp: 30 g, Rfl: 1     gentamicin (GARAMYCIN) 0.1 % external ointment, Apply topically 3 times daily Apply topically 3 times daily Apply 0.25gram to buttocks wound 3 times per day for 14 days - Topical, Disp: 15 g, Rfl: 0     loperamide (IMODIUM) 2 MG capsule, Take 2 mg by mouth 4 times daily as needed for diarrhea , Disp: , Rfl:      magic mouthwash suspension (diphenhydrAMINE, lidocaine, aluminum-magnesium & simethicone), Swish and swallow 10 mLs in mouth every 6 hours as needed for mouth sores , Disp: , Rfl:      ondansetron (ZOFRAN) 4 MG tablet, Take 1 tablet (4 mg) by mouth every 8 hours as  needed for nausea, Disp: 20 tablet, Rfl: 1     prochlorperazine (COMPAZINE) 10 MG tablet, Take 10 mg by mouth , Disp: , Rfl:      study - lidocaine/prilocaine, IDS# [id number], 2.5-2.5 % external cream, Apply topically as needed for moderate pain Apply topically as directed prior to blood draw., Disp: , Rfl:     Current Facility-Administered Medications:      lidocaine (XYLOCAINE) 2 % external gel, , Topical, Daily PRN, Alee Levine, NP, Given at 08/30/21 1414    History:   Past Medical History:   Diagnosis Date     Breast cancer (H)      Breast mass, right 4/9/2021    Dx April 2021      Chemotherapy induced nausea and vomiting 7/18/2021     Chemotherapy-induced neutropenia (H)      Colitis, acute      Encounter for antineoplastic chemotherapy 4/22/2021     Fever and chills 5/22/2021     Functional diarrhea 7/18/2021     Hepatitis      Lesion of liver less than 1 cm in diameter      Malignant neoplasm of nipple of right breast in female, estrogen receptor positive (H) 4/22/2021     Mucositis due to chemotherapy      Neutropenic fever (H)      Neutropenic sepsis (H) 5/23/2021       Physical Exam:    /66   Pulse 80   Temp 98.3  F (36.8  C)   Resp 16     General:  Patient presents to clinic in no apparent distress.  Head: normocephalic atraumatic  Psychiatric:  Alert and oriented x3.   Respiratory: unlabored breathing; no cough  Integumentary:  Skin is uniformly warm, dry and pink.    Wound #1 Location: near rectal opening  Size: 1L x 0.5W x 0.1depth.  RANJEET sinus tract present, Wound base: red; hypergranular  noundermining present. Wound is full thickness. There is moderate yellow brown mucoid  drainage. Periwound: no denudement, erythema, induration, maceration or warmth.      Port A Cath Single 04/28/21 Left Chest wall (Active)   Site Assessment WDL 09/02/21 0810   Dressing Status intact;dry;clean 09/02/21 0810   Dressing Intervention Transparent;New dressing;Gauze 09/02/21 0810   Line Status Blood return noted  09/02/21 1052   Access Date 09/02/21 09/02/21 0810   Access Attempts 1 09/02/21 0810   Gauge Noncoring 90 degree bend;20 gauge;3/4 inch 09/02/21 0810   Line Necessity Yes, meets criteria 09/02/21 0810   Number of days: 147       VASC Wound sacrum (Active)   Pre Size Length 1 09/22/21 1500   Pre Size Width 0.5 09/22/21 1500   Pre Size Depth 0.1 09/22/21 1500   Pre Total Sq cm 0.5 09/22/21 1500   Number of days: 23            Circumferential volume measures:      No flowsheet data found.    Labs:    I personally reviewed the following lab results today and those on care everywhere, if indicated     CRP   Date Value Ref Range Status   04/01/2021 <0.1 0.0 - 0.8 mg/dL Final      No results found for: SED   Last Renal Panel:  Sodium   Date Value Ref Range Status   09/02/2021 137 136 - 145 mmol/L Final     Potassium   Date Value Ref Range Status   09/02/2021 3.8 3.5 - 5.0 mmol/L Final     Chloride   Date Value Ref Range Status   09/02/2021 102 98 - 107 mmol/L Final     Carbon Dioxide (CO2)   Date Value Ref Range Status   09/02/2021 23 22 - 31 mmol/L Final     Anion Gap   Date Value Ref Range Status   09/02/2021 12 5 - 18 mmol/L Final     Glucose   Date Value Ref Range Status   09/02/2021 163 (H) 70 - 125 mg/dL Final     Urea Nitrogen   Date Value Ref Range Status   09/02/2021 13 8 - 22 mg/dL Final     Creatinine   Date Value Ref Range Status   09/02/2021 0.67 0.60 - 1.10 mg/dL Final     GFR Estimate   Date Value Ref Range Status   09/02/2021 >90 >60 mL/min/1.73m2 Final     Comment:     As of July 11, 2021, eGFR is calculated by the CKD-EPI creatinine equation, without race adjustment. eGFR can be influenced by muscle mass, exercise, and diet. The reported eGFR is an estimation only and is only applicable if the renal function is stable.   06/24/2021 >60 >60 mL/min/1.73m2 Final     Calcium   Date Value Ref Range Status   09/02/2021 9.8 8.5 - 10.5 mg/dL Final     Albumin   Date Value Ref Range Status   09/02/2021 3.9 3.5 -  5.0 g/dL Final      Lab Results   Component Value Date    WBC 5.2 09/02/2021     Lab Results   Component Value Date    RBC 3.63 09/02/2021     Lab Results   Component Value Date    HGB 11.5 09/02/2021     Lab Results   Component Value Date    HCT 35.0 09/02/2021     No components found for: MCT  Lab Results   Component Value Date    MCV 96 09/02/2021     Lab Results   Component Value Date    MCH 31.7 09/02/2021     Lab Results   Component Value Date    MCHC 32.9 09/02/2021     Lab Results   Component Value Date    RDW 13.7 09/02/2021     Lab Results   Component Value Date     09/02/2021      No results found for: A1C   No results found for: TSH   No results found for: VITDT                Impression:  Encounter Diagnoses   Name Primary?     Anal fissure Yes     Functional diarrhea      Colocutaneous fistula                    Are any of these wounds new today: No; Location: na    Assessment/Plan:          1. Debridement: not done     2.  Wound treatment: wound treatment will include irrigation and dressings to promote autolytic debridement which will include:appears to have fistula; will refer to [facility] for evaluation; will have her continue triad paste until seen; unsure if her mastectomy surgery will be delayed due to this Stable            3. Edema: na.            4. Nutrition: na           5. Offloading: na     Patient will follow up with me PRN for reevaluation. They were instructed to call the clinic sooner with any signs or symptoms of infection or any further questions/concerns. Answered all questions.          Alee Levine DNP, RN, CNP, CWOCN, CFCN, CLT  [facility]   [phone]        This note was electronically signed by Alee Levine NP

## 2021-09-22 NOTE — PATIENT INSTRUCTIONS
It appears you may have a colocutaneous fistula  I would like you evaluated by a proctologist at [facility]      Please call [phone] to schedule an appt          Apply the Triad paste 2-3 times per day to the rectal wound  Use pea sized amount  No dressings needed  Keep the area clean; ok to shower; use tap water to cleanse the area gently  Do not aggressively pull apart the buttocks or scrub the area  Can use sanitary napkin/pad in the underwear to protect clothing  Ok to use hair dryer on cool setting to dry the area

## 2021-09-22 NOTE — TELEPHONE ENCOUNTER
Called Joanne with the assistance of a [place] , Jerica. #[id number]. Followed up regarding her missed clinic visit and herceptin/perjeta infusion that was scheduled today. Our  Carey had spoken with her friend Yumiko, about the rescheduling of her appointment which is now on 9/27, checki in at 0930. Joanne thought she was done with chemo so was unaware of infusion. Clarified that she is done with the chemo, Docetaxel and Carboplatin, but is still receiving the Herceptin and Perjeta. She understands that it is safe  for her to receive these agents prior to her 10/6 surgery, Joanne expressed understanding and will plan to come to clinic on 9/27 for follow up with Td, AMARI and infusion,as scheduled. (no labs per Dr. Sanchez). She will also talk with her friend Yumiko regarding the clarification/Kira Ham RN

## 2021-09-27 ENCOUNTER — INFUSION THERAPY VISIT (OUTPATIENT)
Dept: INFUSION THERAPY | Facility: CLINIC | Age: 36
End: 2021-09-27
Attending: NURSE PRACTITIONER
Payer: MEDICAID

## 2021-09-27 ENCOUNTER — ONCOLOGY VISIT (OUTPATIENT)
Dept: ONCOLOGY | Facility: CLINIC | Age: 36
End: 2021-09-27
Attending: INTERNAL MEDICINE
Payer: MEDICAID

## 2021-09-27 VITALS
HEIGHT: 64 IN | RESPIRATION RATE: 16 BRPM | OXYGEN SATURATION: 98 % | BODY MASS INDEX: 21.68 KG/M2 | WEIGHT: 127 LBS | HEART RATE: 84 BPM | DIASTOLIC BLOOD PRESSURE: 65 MMHG | TEMPERATURE: 98.2 F | SYSTOLIC BLOOD PRESSURE: 102 MMHG

## 2021-09-27 DIAGNOSIS — T45.1X5A CHEMOTHERAPY INDUCED NAUSEA AND VOMITING: ICD-10-CM

## 2021-09-27 DIAGNOSIS — C50.211 MALIGNANT NEOPLASM OF UPPER-INNER QUADRANT OF RIGHT BREAST IN FEMALE, ESTROGEN RECEPTOR POSITIVE (H): Primary | ICD-10-CM

## 2021-09-27 DIAGNOSIS — K59.1 FUNCTIONAL DIARRHEA: ICD-10-CM

## 2021-09-27 DIAGNOSIS — Z17.0 MALIGNANT NEOPLASM OF UPPER-INNER QUADRANT OF RIGHT BREAST IN FEMALE, ESTROGEN RECEPTOR POSITIVE (H): Primary | ICD-10-CM

## 2021-09-27 DIAGNOSIS — C50.011 MALIGNANT NEOPLASM OF NIPPLE OF RIGHT BREAST IN FEMALE, ESTROGEN RECEPTOR POSITIVE (H): ICD-10-CM

## 2021-09-27 DIAGNOSIS — Z51.11 ENCOUNTER FOR ANTINEOPLASTIC CHEMOTHERAPY: ICD-10-CM

## 2021-09-27 DIAGNOSIS — Z17.0 MALIGNANT NEOPLASM OF NIPPLE OF RIGHT BREAST IN FEMALE, ESTROGEN RECEPTOR POSITIVE (H): ICD-10-CM

## 2021-09-27 DIAGNOSIS — C50.011 MALIGNANT NEOPLASM OF NIPPLE OF RIGHT BREAST IN FEMALE, ESTROGEN RECEPTOR POSITIVE (H): Primary | ICD-10-CM

## 2021-09-27 DIAGNOSIS — S31.000D WOUND OF SACRAL REGION, SUBSEQUENT ENCOUNTER: ICD-10-CM

## 2021-09-27 DIAGNOSIS — R11.2 CHEMOTHERAPY INDUCED NAUSEA AND VOMITING: ICD-10-CM

## 2021-09-27 DIAGNOSIS — Z17.0 MALIGNANT NEOPLASM OF NIPPLE OF RIGHT BREAST IN FEMALE, ESTROGEN RECEPTOR POSITIVE (H): Primary | ICD-10-CM

## 2021-09-27 LAB
ALBUMIN SERPL-MCNC: 3.6 G/DL (ref 3.5–5)
ALP SERPL-CCNC: 75 U/L (ref 45–120)
ALT SERPL W P-5'-P-CCNC: 18 U/L (ref 0–45)
ANION GAP SERPL CALCULATED.3IONS-SCNC: 12 MMOL/L (ref 5–18)
AST SERPL W P-5'-P-CCNC: 17 U/L (ref 0–40)
BASOPHILS # BLD AUTO: 0 10E3/UL (ref 0–0.2)
BASOPHILS NFR BLD AUTO: 1 %
BILIRUB SERPL-MCNC: 0.3 MG/DL (ref 0–1)
BUN SERPL-MCNC: 12 MG/DL (ref 8–22)
CALCIUM SERPL-MCNC: 9.3 MG/DL (ref 8.5–10.5)
CHLORIDE BLD-SCNC: 105 MMOL/L (ref 98–107)
CO2 SERPL-SCNC: 22 MMOL/L (ref 22–31)
CREAT SERPL-MCNC: 0.64 MG/DL (ref 0.6–1.1)
EOSINOPHIL # BLD AUTO: 0.2 10E3/UL (ref 0–0.7)
EOSINOPHIL NFR BLD AUTO: 2 %
ERYTHROCYTE [DISTWIDTH] IN BLOOD BY AUTOMATED COUNT: 13.6 % (ref 10–15)
GFR SERPL CREATININE-BSD FRML MDRD: >90 ML/MIN/1.73M2
GLUCOSE BLD-MCNC: 102 MG/DL (ref 70–125)
HCT VFR BLD AUTO: 35.1 % (ref 35–47)
HGB BLD-MCNC: 11.4 G/DL (ref 11.7–15.7)
HOLD SPECIMEN: NORMAL
IMM GRANULOCYTES # BLD: 0 10E3/UL
IMM GRANULOCYTES NFR BLD: 0 %
LYMPHOCYTES # BLD AUTO: 1.7 10E3/UL (ref 0.8–5.3)
LYMPHOCYTES NFR BLD AUTO: 26 %
MCH RBC QN AUTO: 31.1 PG (ref 26.5–33)
MCHC RBC AUTO-ENTMCNC: 32.5 G/DL (ref 31.5–36.5)
MCV RBC AUTO: 96 FL (ref 78–100)
MONOCYTES # BLD AUTO: 0.4 10E3/UL (ref 0–1.3)
MONOCYTES NFR BLD AUTO: 6 %
NEUTROPHILS # BLD AUTO: 4.3 10E3/UL (ref 1.6–8.3)
NEUTROPHILS NFR BLD AUTO: 65 %
NRBC # BLD AUTO: 0 10E3/UL
NRBC BLD AUTO-RTO: 0 /100
PLATELET # BLD AUTO: 211 10E3/UL (ref 150–450)
POTASSIUM BLD-SCNC: 3.9 MMOL/L (ref 3.5–5)
PROT SERPL-MCNC: 6.6 G/DL (ref 6–8)
RBC # BLD AUTO: 3.66 10E6/UL (ref 3.8–5.2)
SODIUM SERPL-SCNC: 139 MMOL/L (ref 136–145)
WBC # BLD AUTO: 6.6 10E3/UL (ref 4–11)

## 2021-09-27 PROCEDURE — 258N000003 HC RX IP 258 OP 636: Performed by: NURSE PRACTITIONER

## 2021-09-27 PROCEDURE — G0463 HOSPITAL OUTPT CLINIC VISIT: HCPCS | Mod: 25

## 2021-09-27 PROCEDURE — G0463 HOSPITAL OUTPT CLINIC VISIT: HCPCS

## 2021-09-27 PROCEDURE — 250N000011 HC RX IP 250 OP 636: Performed by: NURSE PRACTITIONER

## 2021-09-27 PROCEDURE — 82040 ASSAY OF SERUM ALBUMIN: CPT | Performed by: NURSE PRACTITIONER

## 2021-09-27 PROCEDURE — 85025 COMPLETE CBC W/AUTO DIFF WBC: CPT | Performed by: NURSE PRACTITIONER

## 2021-09-27 PROCEDURE — 96413 CHEMO IV INFUSION 1 HR: CPT

## 2021-09-27 PROCEDURE — 36591 DRAW BLOOD OFF VENOUS DEVICE: CPT | Performed by: NURSE PRACTITIONER

## 2021-09-27 PROCEDURE — 99214 OFFICE O/P EST MOD 30 MIN: CPT | Performed by: NURSE PRACTITIONER

## 2021-09-27 RX ORDER — HEPARIN SODIUM,PORCINE 10 UNIT/ML
5 VIAL (ML) INTRAVENOUS
Status: CANCELLED | OUTPATIENT
Start: 2021-09-27

## 2021-09-27 RX ORDER — EPINEPHRINE 1 MG/ML
0.3 INJECTION, SOLUTION INTRAMUSCULAR; SUBCUTANEOUS EVERY 5 MIN PRN
Status: CANCELLED | OUTPATIENT
Start: 2021-09-27

## 2021-09-27 RX ORDER — DIPHENHYDRAMINE HYDROCHLORIDE 50 MG/ML
50 INJECTION INTRAMUSCULAR; INTRAVENOUS
Status: CANCELLED
Start: 2021-09-27

## 2021-09-27 RX ORDER — LORAZEPAM 2 MG/ML
0.5 INJECTION INTRAMUSCULAR EVERY 4 HOURS PRN
Status: CANCELLED
Start: 2021-09-27

## 2021-09-27 RX ORDER — METHYLPREDNISOLONE SODIUM SUCCINATE 125 MG/2ML
125 INJECTION, POWDER, LYOPHILIZED, FOR SOLUTION INTRAMUSCULAR; INTRAVENOUS
Status: CANCELLED
Start: 2021-09-27

## 2021-09-27 RX ORDER — NALOXONE HYDROCHLORIDE 0.4 MG/ML
0.2 INJECTION, SOLUTION INTRAMUSCULAR; INTRAVENOUS; SUBCUTANEOUS
Status: CANCELLED | OUTPATIENT
Start: 2021-09-27

## 2021-09-27 RX ORDER — LORAZEPAM 2 MG/ML
0.5 INJECTION INTRAMUSCULAR EVERY 4 HOURS PRN
Status: DISCONTINUED | OUTPATIENT
Start: 2021-09-27 | End: 2021-09-27 | Stop reason: HOSPADM

## 2021-09-27 RX ORDER — HEPARIN SODIUM (PORCINE) LOCK FLUSH IV SOLN 100 UNIT/ML 100 UNIT/ML
5 SOLUTION INTRAVENOUS
Status: CANCELLED | OUTPATIENT
Start: 2021-09-27

## 2021-09-27 RX ORDER — ALBUTEROL SULFATE 0.83 MG/ML
2.5 SOLUTION RESPIRATORY (INHALATION)
Status: CANCELLED | OUTPATIENT
Start: 2021-09-27

## 2021-09-27 RX ORDER — ALBUTEROL SULFATE 90 UG/1
1-2 AEROSOL, METERED RESPIRATORY (INHALATION)
Status: CANCELLED
Start: 2021-09-27

## 2021-09-27 RX ORDER — ACETAMINOPHEN 325 MG/1
650 TABLET ORAL
Status: CANCELLED
Start: 2021-09-27

## 2021-09-27 RX ORDER — ACETAMINOPHEN 325 MG/1
650 TABLET ORAL
Status: DISCONTINUED | OUTPATIENT
Start: 2021-09-27 | End: 2021-09-27 | Stop reason: HOSPADM

## 2021-09-27 RX ORDER — DIPHENHYDRAMINE HCL 50 MG
50 CAPSULE ORAL
Status: CANCELLED | OUTPATIENT
Start: 2021-09-27

## 2021-09-27 RX ORDER — DIPHENHYDRAMINE HCL 50 MG
50 CAPSULE ORAL
Status: DISCONTINUED | OUTPATIENT
Start: 2021-09-27 | End: 2021-09-27 | Stop reason: HOSPADM

## 2021-09-27 RX ORDER — MEPERIDINE HYDROCHLORIDE 50 MG/ML
25 INJECTION INTRAMUSCULAR; INTRAVENOUS; SUBCUTANEOUS EVERY 30 MIN PRN
Status: CANCELLED | OUTPATIENT
Start: 2021-09-27

## 2021-09-27 RX ORDER — HEPARIN SODIUM,PORCINE 10 UNIT/ML
5 VIAL (ML) INTRAVENOUS
Status: DISCONTINUED | OUTPATIENT
Start: 2021-09-27 | End: 2021-09-27 | Stop reason: HOSPADM

## 2021-09-27 RX ORDER — HEPARIN SODIUM (PORCINE) LOCK FLUSH IV SOLN 100 UNIT/ML 100 UNIT/ML
5 SOLUTION INTRAVENOUS
Status: DISCONTINUED | OUTPATIENT
Start: 2021-09-27 | End: 2021-09-27 | Stop reason: HOSPADM

## 2021-09-27 RX ADMIN — HEPARIN SODIUM (PORCINE) LOCK FLUSH IV SOLN 100 UNIT/ML 5 ML: 100 SOLUTION at 12:22

## 2021-09-27 RX ADMIN — SODIUM CHLORIDE 250 ML: 9 INJECTION, SOLUTION INTRAVENOUS at 11:16

## 2021-09-27 RX ADMIN — TRASTUZUMAB 348 MG: 150 INJECTION, POWDER, LYOPHILIZED, FOR SOLUTION INTRAVENOUS at 12:18

## 2021-09-27 ASSESSMENT — PAIN SCALES - GENERAL: PAINLEVEL: SEVERE PAIN (7)

## 2021-09-27 ASSESSMENT — MIFFLIN-ST. JEOR: SCORE: 1241.07

## 2021-09-27 NOTE — PROGRESS NOTES
[facility] Hematology and Oncology Progress Note    Patient: Joanne Colon  MRN: [id number]  Date of Service: Sep 27, 2021         Reason for Visit:      Follow up trastuzumab and pertuzumab therapies     Assessment and Plan:     1.           cT2 N1 M0 invasive ductal carcinoma (R) breast, grade 3, ER positive, [place] positive, HER-2 positive.    36 year old     Ms. Joanne Colon presents with her sister, Shadi, and no Turkmen .  Once completing the TC chemotherapy the severe diarrhea subsided.  She has an open sore over the sacrum.  The pain has improved in the rectal area as well, but she is having yellow/brown mucoid drainage from the wound which smells of stool.  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  She has yet to meet with them or have an appointment.  Her heartburn has been effectively managed with omeprazole 20 mg 1-2 x daily.  No recent nausea.  Her appetite is good and weight stable with start of treatment weight.  No fever.  No mouth sores.  Breast mass no longer palpable.  She denies cough, fever, chills, unusual headaches, visual or mentation disturbance, bladder issues, rash, neuropathies.    CBC - WBC, differential and Plts WNL.  HgB quite stable @ 11.4    CMP - WNL.    Nausea - mostly resolved.  Managed with Compazine as needed.  Also has Rx for Zofran 4 mg every 8 hours if needed.    GERD - resolved with omeprazole 20 mg 1-2 x daily prn.      Diarrhea - now mostly resolved with completing adjuvant chemotherapy.  Previously managed with imodium (2-tabs with 1st episode diarrhea in a 24 hour period, followed by 1-tab with each subsequent episode diarrhea).     Pain - r/t sacral wound.  Managed with Tylenol.  Reviewed dose limit of 3 grams/day.    With potential colocutaneous fistula will proceed with the trastuzumab today but HOLD the pertuzumab component of therapy as it has associated neutropenia @ 47% to  53%.    Will have our receptionists assist in making the [facility] appointment.     Continue Triad paste 2-3 times per day to the rectal wound as per Vascular.  Keep the area clean and cleanse the area gently.      Instructed that she needs to contact us with a fever >100.4F as antibiotics or even hospitalization may be indicated..      Joanne has received both Pfizer SARSBrand Thunderd.19 vaccines.  Encouraged on receiving the seasonal flu vaccine.    10/06/21 - scheduled for bilateral mastectomy and right sentinel lymph node biopsy.     10/18/21 - follow up next cycle trastuzumab + pertuzumab therapies with labs per Treatment Plan.     Oncologic History:     1.           cT2 N1 M0 invasive ductal carcinoma (R) breast, grade 3, ER positive, [place] positive, HER-2 positive.    2021, April - presented to the ED with a 3-month history of a painful mass in her right breast.  No nipple changes or discharge.    ? 04/13/21 diagnostic mammogram and ultrasound - confirmed a 2.2 x 2.8 x 1.4 cm right breast mass in the 8 o'clock position, 5 cm from the nipple with abnormal appearing right axillary lymph node of 1.7 cm with cortical thickness.    ? 04/13/21 US-guided core needle biopsy - confirmed invasive ductal carcinoma, grade 3, ER moderate to strong positive, [place] strongly positive, HER-2 positive by IHC.  ? Consult with Dr. Perdomo, surgeon - recommended neoadjuvant chemotherapy.    05/13 - 09/02/2021 completed 6 cycles neoadjuvant TCHP [AUC 5] chemotherapy.    ? Required hospitalization with neutropenic fever, neutropenic colitis after cycle #1.    ? Neulasta added starting cycle #2 chemotherapy.    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    ECOG Performance:    0 - Independent    Pain:    Pain Score: Severe Pain (7)  Pain Loc: Other - see comment (Buttock wound getting worse. ) - see above.    Encounter Diagnoses:    Problem List Items Addressed This Visit        Digestive    Functional diarrhea     Chemotherapy induced nausea and vomiting       Other    Malignant neoplasm of nipple of right breast in female, estrogen receptor positive (H)    Relevant Orders    Infusion Appointment Request    Encounter for antineoplastic chemotherapy    Relevant Orders    CBC with platelets and differential (Completed)    Comprehensive metabolic panel (Completed)    Infusion Appointment Request      Other Visit Diagnoses     Malignant neoplasm of upper-inner quadrant of right breast in female, estrogen receptor positive (H)    -  Primary    Relevant Orders    CBC with platelets and differential (Completed)    Comprehensive metabolic panel (Completed)    Wound of sacral region, subsequent encounter        Relevant Orders    CBC with platelets and differential (Completed)    Comprehensive metabolic panel (Completed)             Total time 36 minutes.    Td Padilla, AMARI     CC: Ye Lira MD   ______________________________________________________________________________    Review of Systems:    No fever or night sweats.  No loss of weight.  No lumps or bumps anywhere.  No unusual headaches or eyesight issues.  No dizziness.  No bleeding from the nose.  No sores in the mouth. No problems with swallowing.  No chest pain. No shortness of breath. No cough.  No abdominal pain. No nausea or vomiting.    Diarrhea resolved.     No blood in stool or black colored stools.  No problems passing urine.  No numbness or tingling in hands or feet.  No skin rashes.    A 14 point review of systems is otherwise negative.    Past History:    Past Medical History:   Diagnosis Date     Breast cancer (H)      Breast mass, right 4/9/2021    Dx April 2021      Chemotherapy induced nausea and vomiting 7/18/2021     Chemotherapy-induced neutropenia (H)      Colitis, acute      Encounter for antineoplastic chemotherapy 4/22/2021     Fever and chills 5/22/2021     Functional diarrhea 7/18/2021     Hepatitis      Lesion of liver less than 1 cm in  "diameter      Malignant neoplasm of nipple of right breast in female, estrogen receptor positive (H) 2021     Mucositis due to chemotherapy      Neutropenic fever (H)      Neutropenic sepsis (H) 2021       Past Surgical History:   Procedure Laterality Date      SECTION      x two     IR CHEST PORT PLACEMENT > 5 YRS OF AGE  2021     IR PORT PLACEMENT >5 YEARS  2021     US BIOPSY FINE NEEDLE ASPIRATION LYMPH NODE (BREAST) RIGHT Right 2021     US BREAST CORE BIOPSY RIGHT Right 2021     Physical Exam:    /65 (Patient Position: Sitting)   Pulse 84   Temp 98.2  F (36.8  C) (Oral)   Resp 16   Ht 1.626 m (5' 4\")   Wt 57.6 kg (127 lb)   SpO2 98%   BMI 21.80 kg/m      GENERAL:       Alert and oriented.  Seated comfortably.  No acute distress.     HEENT:             Atraumatic and normocephalic.  LISSETH.  EOMI.  No pallor.  No icterus.  No mucosal lesions.     LYMPH NODES:  No palpable, cervical, axillary or inguinal lymphadenopathy.     CHEST:               Lungs clear to auscultation bilaterally.    BREAST:  (R) - no palpable lump or concerning skin/nipple changes.     CVS:                      S1 and S2 heard.  Regular rate and rhythm.  No murmur, gallop or rub heard.  No peripheral edema.     ABDOMEN:          Soft.  Not tender or distended. No palpable hepatomegaly or splenomegaly, masses or ascites.  Bowel sounds heard.     EXTREMITIES Warm.     SKIN:              No rash, bruising or purpura noted.  Partial alopecia.    Lab Results:    Reviewed with patient and her sister.    Recent Results (from the past 120 hour(s))   Extra Red Top Tube    Collection Time: 21  9:48 AM   Result Value Ref Range    Hold Specimen JIC    Extra Green Top (Lithium Heparin) Tube    Collection Time: 21  9:48 AM   Result Value Ref Range    Hold Specimen JIC    Extra Purple Top Tube    Collection Time: 21  9:48 AM   Result Value Ref Range    Hold Specimen JIC    Comprehensive " metabolic panel    Collection Time: 09/27/21  9:49 AM   Result Value Ref Range    Sodium 139 136 - 145 mmol/L    Potassium 3.9 3.5 - 5.0 mmol/L    Chloride 105 98 - 107 mmol/L    Carbon Dioxide (CO2) 22 22 - 31 mmol/L    Anion Gap 12 5 - 18 mmol/L    Urea Nitrogen 12 8 - 22 mg/dL    Creatinine 0.64 0.60 - 1.10 mg/dL    Calcium 9.3 8.5 - 10.5 mg/dL    Glucose 102 70 - 125 mg/dL    Alkaline Phosphatase 75 45 - 120 U/L    AST 17 0 - 40 U/L    ALT 18 0 - 45 U/L    Protein Total 6.6 6.0 - 8.0 g/dL    Albumin 3.6 3.5 - 5.0 g/dL    Bilirubin Total 0.3 0.0 - 1.0 mg/dL    GFR Estimate >90 >60 mL/min/1.73m2   CBC with platelets and differential    Collection Time: 09/27/21  9:49 AM   Result Value Ref Range    WBC Count 6.6 4.0 - 11.0 10e3/uL    RBC Count 3.66 (L) 3.80 - 5.20 10e6/uL    Hemoglobin 11.4 (L) 11.7 - 15.7 g/dL    Hematocrit 35.1 35.0 - 47.0 %    MCV 96 78 - 100 fL    MCH 31.1 26.5 - 33.0 pg    MCHC 32.5 31.5 - 36.5 g/dL    RDW 13.6 10.0 - 15.0 %    Platelet Count 211 150 - 450 10e3/uL    % Neutrophils 65 %    % Lymphocytes 26 %    % Monocytes 6 %    % Eosinophils 2 %    % Basophils 1 %    % Immature Granulocytes 0 %    NRBCs per 100 WBC 0 <1 /100    Absolute Neutrophils 4.3 1.6 - 8.3 10e3/uL    Absolute Lymphocytes 1.7 0.8 - 5.3 10e3/uL    Absolute Monocytes 0.4 0.0 - 1.3 10e3/uL    Absolute Eosinophils 0.2 0.0 - 0.7 10e3/uL    Absolute Basophils 0.0 0.0 - 0.2 10e3/uL    Absolute Immature Granulocytes 0.0 <=0.0 10e3/uL    Absolute NRBCs 0.0 10e3/uL     Imaging:    No results found.

## 2021-09-27 NOTE — LETTER
"    9/27/2021         RE: Joanne Colon  [address]  [place] [address]        Dear Colleague,    Thank you for referring your patient, Joanne Colon, to the [facility] CANCER [facility]. Please see a copy of my visit note below.    Oncology Rooming Note    September 27, 2021 9:58 AM   Joanne Colon is a 36 year old female who presents for:    Chief Complaint   Patient presents with     Breast Cancer     Initial Vitals: /65 (Patient Position: Sitting)   Pulse 84   Temp 98.2  F (36.8  C) (Oral)   Resp 16   Ht 1.626 m (5' 4\")   Wt 57.6 kg (127 lb)   SpO2 98%   BMI 21.80 kg/m   Estimated body mass index is 21.8 kg/m  as calculated from the following:    Height as of this encounter: 1.626 m (5' 4\").    Weight as of this encounter: 57.6 kg (127 lb). Body surface area is 1.61 meters squared.  Severe Pain (7) Comment: Data Unavailable   No LMP recorded. (Menstrual status: Chemotherapy).  Allergies reviewed: Yes  Medications reviewed: Yes    Medications: Medication refills not needed today.  Pharmacy name entered into [employer]: [facility] #[id number] - [place], [place] - [address]  AT [address] [facility]    Clinical concerns:  Having surgery on 10/6/21 patient will need to be seen before then-asking for our help. Wound getting worse appears to have stool coming through it.       ANGELOLOS KEMP                [facility] Hematology and Oncology Progress Note    Patient: Joanne Colon  MRN: [id number]  Date of Service: Sep 27, 2021         Reason for Visit:      Follow up trastuzumab and pertuzumab therapies     Assessment and Plan:     1.           cT2 N1 M0 invasive ductal carcinoma (R) breast, grade 3, ER positive, [place] positive, HER-2 positive.    36 year old     Ms. Joanne Colon presents with her sister, Shadi, and no Syriac .  Once completing the TCHP chemotherapy the severe diarrhea subsided.  She has an open sore over the sacrum.  The pain has improved in the rectal area " as well, but she is having yellow/brown mucoid drainage from the wound which smells of stool.  She is being followed by the Vascular clinic who referred her to 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  She has yet to meet with them or have an appointment.  Her heartburn has been effectively managed with omeprazole 20 mg 1-2 x daily.  No recent nausea.  Her appetite is good and weight stable with start of treatment weight.  No fever.  No mouth sores.  Breast mass no longer palpable.  She denies cough, fever, chills, unusual headaches, visual or mentation disturbance, bladder issues, rash, neuropathies.    CBC - WBC, differential and Plts WNL.  HgB quite stable @ 11.4    CMP - WNL.    Nausea - mostly resolved.  Managed with Compazine as needed.  Also has Rx for Zofran 4 mg every 8 hours if needed.    GERD - resolved with omeprazole 20 mg 1-2 x daily prn.      Diarrhea - now mostly resolved with completing adjuvant chemotherapy.  Previously managed with imodium (2-tabs with 1st episode diarrhea in a 24 hour period, followed by 1-tab with each subsequent episode diarrhea).     Pain - r/t sacral wound.  Managed with Tylenol.  Reviewed dose limit of 3 grams/day.    With potential colocutaneous fistula will proceed with the trastuzumab today but HOLD the pertuzumab component of therapy as it has associated neutropenia @ 47% to 53%.    Will have our receptionists assist in making the [facility] appointment.     Continue Triad paste 2-3 times per day to the rectal wound as per Vascular.  Keep the area clean and cleanse the area gently.      Instructed that she needs to contact us with a fever >100.4F as antibiotics or even hospitalization may be indicated..      Joanne has received both Pfizer SARScovid.19 vaccines.  Encouraged on receiving the seasonal flu vaccine.    10/06/21 - scheduled for bilateral mastectomy and right sentinel lymph node biopsy.     10/18/21 - follow up next cycle trastuzumab + pertuzumab therapies with  labs per Treatment Plan.     Oncologic History:     1.           cT2 N1 M0 invasive ductal carcinoma (R) breast, grade 3, ER positive, [place] positive, HER-2 positive.    2021, April - presented to the ED with a 3-month history of a painful mass in her right breast.  No nipple changes or discharge.    ? 04/13/21 diagnostic mammogram and ultrasound - confirmed a 2.2 x 2.8 x 1.4 cm right breast mass in the 8 o'clock position, 5 cm from the nipple with abnormal appearing right axillary lymph node of 1.7 cm with cortical thickness.    ? 04/13/21 US-guided core needle biopsy - confirmed invasive ductal carcinoma, grade 3, ER moderate to strong positive, [place] strongly positive, HER-2 positive by IHC.  ? Consult with Dr. Perdomo, surgeon - recommended neoadjuvant chemotherapy.    05/13 - 09/02/2021 completed 6 cycles neoadjuvant TCHP [AUC 5] chemotherapy.    ? Required hospitalization with neutropenic fever, neutropenic colitis after cycle #1.    ? Neulasta added starting cycle #2 chemotherapy.    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    ECOG Performance:    0 - Independent    Pain:    Pain Score: Severe Pain (7)  Pain Loc: Other - see comment (Buttock wound getting worse. ) - see above.    Encounter Diagnoses:    Problem List Items Addressed This Visit     None             Total time 36 minutes.    Td Padilla CNP     CC: Ye Lira MD   ______________________________________________________________________________    Review of Systems:    No fever or night sweats.  No loss of weight.  No lumps or bumps anywhere.  No unusual headaches or eyesight issues.  No dizziness.  No bleeding from the nose.  No sores in the mouth. No problems with swallowing.  No chest pain. No shortness of breath. No cough.  No abdominal pain. No nausea or vomiting.    Diarrhea resolved.     No blood in stool or black colored stools.  No problems passing urine.  No numbness or tingling in hands or  "feet.  No skin rashes.    A 14 point review of systems is otherwise negative.    Past History:    Past Medical History:   Diagnosis Date     Breast cancer (H)      Breast mass, right 2021    Dx 2021      Chemotherapy induced nausea and vomiting 2021     Chemotherapy-induced neutropenia (H)      Colitis, acute      Encounter for antineoplastic chemotherapy 2021     Fever and chills 2021     Functional diarrhea 2021     Hepatitis      Lesion of liver less than 1 cm in diameter      Malignant neoplasm of nipple of right breast in female, estrogen receptor positive (H) 2021     Mucositis due to chemotherapy      Neutropenic fever (H)      Neutropenic sepsis (H) 2021       Past Surgical History:   Procedure Laterality Date      SECTION      x two     IR CHEST PORT PLACEMENT > 5 YRS OF AGE  2021     IR PORT PLACEMENT >5 YEARS  2021     US BIOPSY FINE NEEDLE ASPIRATION LYMPH NODE (BREAST) RIGHT Right 2021     US BREAST CORE BIOPSY RIGHT Right 2021     Physical Exam:    /65 (Patient Position: Sitting)   Pulse 84   Temp 98.2  F (36.8  C) (Oral)   Resp 16   Ht 1.626 m (5' 4\")   Wt 57.6 kg (127 lb)   SpO2 98%   BMI 21.80 kg/m      GENERAL:       Alert and oriented.  Seated comfortably.  No acute distress.     HEENT:             Atraumatic and normocephalic.  LISSETH.  EOMI.  No pallor.  No icterus.  No mucosal lesions.     LYMPH NODES:  No palpable, cervical, axillary or inguinal lymphadenopathy.     CHEST:               Lungs clear to auscultation bilaterally.    BREAST:  (R) - no palpable lump or concerning skin/nipple changes.     CVS:                      S1 and S2 heard.  Regular rate and rhythm.  No murmur, gallop or rub heard.  No peripheral edema.     ABDOMEN:          Soft.  Not tender or distended. No palpable hepatomegaly or splenomegaly, masses or ascites.  Bowel sounds heard.     EXTREMITIES Warm.     SKIN:              No rash, bruising " or purpura noted.  Partial alopecia.    Lab Results:    Reviewed with patient and her sister.    Recent Results (from the past 120 hour(s))   Extra Red Top Tube    Collection Time: 09/27/21  9:48 AM   Result Value Ref Range    Hold Specimen JIC    Extra Green Top (Lithium Heparin) Tube    Collection Time: 09/27/21  9:48 AM   Result Value Ref Range    Hold Specimen JIC    Extra Purple Top Tube    Collection Time: 09/27/21  9:48 AM   Result Value Ref Range    Hold Specimen JIC    Comprehensive metabolic panel    Collection Time: 09/27/21  9:49 AM   Result Value Ref Range    Sodium 139 136 - 145 mmol/L    Potassium 3.9 3.5 - 5.0 mmol/L    Chloride 105 98 - 107 mmol/L    Carbon Dioxide (CO2) 22 22 - 31 mmol/L    Anion Gap 12 5 - 18 mmol/L    Urea Nitrogen 12 8 - 22 mg/dL    Creatinine 0.64 0.60 - 1.10 mg/dL    Calcium 9.3 8.5 - 10.5 mg/dL    Glucose 102 70 - 125 mg/dL    Alkaline Phosphatase 75 45 - 120 U/L    AST 17 0 - 40 U/L    ALT 18 0 - 45 U/L    Protein Total 6.6 6.0 - 8.0 g/dL    Albumin 3.6 3.5 - 5.0 g/dL    Bilirubin Total 0.3 0.0 - 1.0 mg/dL    GFR Estimate >90 >60 mL/min/1.73m2   CBC with platelets and differential    Collection Time: 09/27/21  9:49 AM   Result Value Ref Range    WBC Count 6.6 4.0 - 11.0 10e3/uL    RBC Count 3.66 (L) 3.80 - 5.20 10e6/uL    Hemoglobin 11.4 (L) 11.7 - 15.7 g/dL    Hematocrit 35.1 35.0 - 47.0 %    MCV 96 78 - 100 fL    MCH 31.1 26.5 - 33.0 pg    MCHC 32.5 31.5 - 36.5 g/dL    RDW 13.6 10.0 - 15.0 %    Platelet Count 211 150 - 450 10e3/uL    % Neutrophils 65 %    % Lymphocytes 26 %    % Monocytes 6 %    % Eosinophils 2 %    % Basophils 1 %    % Immature Granulocytes 0 %    NRBCs per 100 WBC 0 <1 /100    Absolute Neutrophils 4.3 1.6 - 8.3 10e3/uL    Absolute Lymphocytes 1.7 0.8 - 5.3 10e3/uL    Absolute Monocytes 0.4 0.0 - 1.3 10e3/uL    Absolute Eosinophils 0.2 0.0 - 0.7 10e3/uL    Absolute Basophils 0.0 0.0 - 0.2 10e3/uL    Absolute Immature Granulocytes 0.0 <=0.0 10e3/uL     Absolute NRBCs 0.0 10e3/uL     Imaging:    No results found.        Again, thank you for allowing me to participate in the care of your patient.        Sincerely,        Td Padilla, CNP

## 2021-09-27 NOTE — PROGRESS NOTES
Infusion Nursing Note:  Joanne Colon presents today for herceptin  Patient seen by provider today: Yes:    present during visit today: Yes, Language: pt uses her sister to interpret.     Note: Pt given only herceptin today. Colin herrera see note from practitioner      Intravenous Access:  Implanted Port.    Treatment Conditions:  Results reviewed, labs MET treatment parameters, ok to proceed with treatment.      Post Infusion Assessment:  Patient tolerated infusion without incident.       Discharge Plan:   Patient and/or family verbalized understanding of discharge instructions and all questions answered.      Celia Contreras RN

## 2021-09-27 NOTE — TELEPHONE ENCOUNTER
Patient is scheduled with [facility] 10/27 as this was the first available appointment at any location. Patient requesting Alee Levine call [facility] to get the patient in for a sooner appointment if possible. [facility]: [phone]

## 2021-09-27 NOTE — PROGRESS NOTES
"Oncology Rooming Note    September 27, 2021 9:58 AM   Joanne Colon is a 36 year old female who presents for:    Chief Complaint   Patient presents with     Breast Cancer     Initial Vitals: /65 (Patient Position: Sitting)   Pulse 84   Temp 98.2  F (36.8  C) (Oral)   Resp 16   Ht 1.626 m (5' 4\")   Wt 57.6 kg (127 lb)   SpO2 98%   BMI 21.80 kg/m   Estimated body mass index is 21.8 kg/m  as calculated from the following:    Height as of this encounter: 1.626 m (5' 4\").    Weight as of this encounter: 57.6 kg (127 lb). Body surface area is 1.61 meters squared.  Severe Pain (7) Comment: Data Unavailable   No LMP recorded. (Menstrual status: Chemotherapy).  Allergies reviewed: Yes  Medications reviewed: Yes    Medications: Medication refills not needed today.  Pharmacy name entered into [employer]: [id number] [place], [place] - [address]    Clinical concerns:  Having surgery on 10/6/21 patient will need to be seen before then-asking for our help. Wound getting worse appears to have stool coming through it.       DERRICK KEMP              "

## 2021-09-30 ENCOUNTER — TELEPHONE (OUTPATIENT)
Dept: ONCOLOGY | Facility: CLINIC | Age: 36
End: 2021-09-30

## 2021-09-30 ENCOUNTER — TELEPHONE (OUTPATIENT)
Dept: SURGERY | Facility: CLINIC | Age: 36
End: 2021-09-30

## 2021-09-30 NOTE — TELEPHONE ENCOUNTER
Patient is calling stating that the apt was canceled on Monday due to her insurance will not cover the visit.  Her insurance is needing a peer to peer for coverage

## 2021-09-30 NOTE — TELEPHONE ENCOUNTER
Spoke to [employer], the rep states that the patient's insurance coverage was terminated as of 8/31/2021.  Because of the termination, there is no peer to peer option.     Patient updated and will call back to be scheduled if she can get her insurance restarted.

## 2021-09-30 NOTE — TELEPHONE ENCOUNTER
Received a call from patient's sister and her friend and , Yumiko.  Patient is scheduled for bilateral mastectomies with Dr. Perdomo on 10-6-21.  Patient's sister says she is interested in immediate reconstruction and would like to see a plastic surgeon.  Dr. Perdomo consulted.  Ok for her to see a plastic surgeon, give them the number for Dr. Rosas and Dr. Lund, but surgery on 10-6-21 will have to be cancelled and rescheduled at a later date.  Warned them that her surgery could be as late as the end of October.  Advised they check with her  for MA BC as well to make sure this would be covered under her plan.  Told them if she is planning to go ahead with new plan of immediate reconstruction to call tomorrow or Monday at the latest so the surgery can be cancelled and opened up for someone else.  Support provided, invited calls.

## 2021-09-30 NOTE — TELEPHONE ENCOUNTER
Patient called and left message.  Message was difficult to hear but what I was able to get was patient requesting call back to [phone] re: canceled Gastroenterology appt due to insurance.    Called Joanne monahan and sister, Shadi, on phone. They said GastroEnterology appt scheduled for 10/4/21 and appt was canceled. She said she didn't quite understand why but thinks it's due to insurance and was instructed to call her PCP.  I instructed patient and Reem to call Alee Levine DNP with Vascular Surgery as she is the one who placed the referral and they can further investigate why appointment was canceled. Alee's number given [phone].    Shadi also reports patient needs a pre-op appointment and was instructed to follow-up with her PCP. She does not know who this is.  Dr. Lira listed as her PCP. Phone # for Dr. Lira given [phone].    Message sent to VANGIE Levine and ERIN Moncada RN

## 2021-10-01 NOTE — TELEPHONE ENCOUNTER
Patients sister is calling stating she doesn't have the insurance information to call, she needs help figuring this all out.

## 2021-10-01 NOTE — TELEPHONE ENCOUNTER
"Sister states she got the name \"Titi Valle\" and was told they work at [facility], but provider's name does not show in [facility] directory. PH: [phone]    She was also given \"Dr. Rosado\" who is located at \"[address] [address]\" with the phone number: [phone]. Looks like she may be referring to: [facility]   "

## 2021-10-01 NOTE — TELEPHONE ENCOUNTER
Patient's sister will call the [place] Medicaid Emergency insurance to find out which gastroenterologist the patient is able to see.  She will call back to let clinic know so a new referral can be placed.

## 2021-10-01 NOTE — TELEPHONE ENCOUNTER
"Due to the pts pain levels, pts two sisters stopped by at the [facility] vascular clinic stating pt no longer has [employer] but now has Medicaid [place] Emergency insurance, member ID number [id number], plan start date 9/2/21. Pts sister Shadi came by with a note showing writer \" [facility] will not take\" this [place] Emergency insurance. Pts siblings are hoping you can assist finding another place for a gastro appt. Please call Shadi [phone]. Pt canceled the surgery appt 10/6/21 due to pt not being able to walk.  "

## 2021-10-01 NOTE — TELEPHONE ENCOUNTER
LMTCB, Pt needs to contact insurance to see what gastroenterologists are covered under her plan then referral can be sent.

## 2021-10-11 ENCOUNTER — TELEPHONE (OUTPATIENT)
Dept: SURGERY | Facility: CLINIC | Age: 36
End: 2021-10-11

## 2021-10-11 NOTE — TELEPHONE ENCOUNTER
Patients sister is calling requesting we call [facility] and schedule the apt for Rania, as this is an emergency and they have waited for two months.  Enmalavon would like a call back today, she is stating that the situation is getting worse, she thought we called last week to schedule the GI apt.

## 2021-10-11 NOTE — TELEPHONE ENCOUNTER
Received a call from Yumiko, on Joanne's behalf.  After meeting with Dr. Rosas, Joanne has decided she would like to have a lumpectomy.  Told Yumiko I would message Dr. Perdomo and Jazmine and ask Jazmine to call her to schedule.

## 2021-10-11 NOTE — TELEPHONE ENCOUNTER
Spent 30 min on phone with the  for Dr. Clifford at [facility]. They never received the referral, so it was refaxed today (fax [phone]).  First opening not until December. Asked that message be sent directly to Dr. Clifford's team, as patient's symptoms are worsening.

## 2021-10-12 DIAGNOSIS — Z17.0 MALIGNANT NEOPLASM OF AXILLARY TAIL OF RIGHT BREAST IN FEMALE, ESTROGEN RECEPTOR POSITIVE (H): Primary | ICD-10-CM

## 2021-10-12 DIAGNOSIS — C50.611 MALIGNANT NEOPLASM OF AXILLARY TAIL OF RIGHT BREAST IN FEMALE, ESTROGEN RECEPTOR POSITIVE (H): Primary | ICD-10-CM

## 2021-10-12 DIAGNOSIS — Z11.59 ENCOUNTER FOR SCREENING FOR OTHER VIRAL DISEASES: ICD-10-CM

## 2021-10-13 ENCOUNTER — TELEPHONE (OUTPATIENT)
Dept: SURGERY | Facility: CLINIC | Age: 36
End: 2021-10-13

## 2021-10-13 NOTE — LETTER
We've received instruction to get you scheduled for surgery with Dr Perdomo. We have that arranged as follows:     Surgery Date: 11/15/2021  Location:  [place] [id number]   Arrival Time: 7:45 am (unless instructed otherwise by the pre-op nurse)    Post op Appointment: 11/23/2021 at 3:40 pm with Dr. Perdomo    Prep Instructions:     1. Please schedule a pre-op physical with your primary care doctor. This may be virtual or face-to-face depending on your doctors preference. Call them right away to schedule this.    2. PCR-Rated COVID19 testing is required within 4 days of surgery. We have this scheduled for you at [facility], [address], [place] on 11/11/2021 at 1:00pm . Follow the specific instructions you receive by Echo. If your test is positive, your surgery will be canceled.     3. Nothing to eat or drink for 8 hours before surgery unless instructed differently by the pre-op nurse.    4. Your surgeon prefers no blood thinners including aspirin for one week prior to surgery but verify this is safe for you with your primary care or prescribing doctor before stopping.     5. Visitor restrictions are in place due to the pandemic. One visitor is allowed for adult surgical patients. Please verify this with the pre-op nurse when they call before surgery because it is subject to change.    6. If you are going home the same day of surgery, you need an adult to drive you home and stay with you 24 hours after surgery.      7. When you arrive to the hospital, you will be screened for COVID19 symptoms. If you screen positive, your surgery will need to be postponed.    8. The community is experiencing a surge in COVID19 hospital admissions which is impacting bed availability at all hospitals. If you are being admitted overnight or longer following surgery, please be aware that your procedure could be cancelled as a result.     9. We always encourage you to notify your  insurance any time you have something scheduled including surgery. The number is usually right on the back of your insurance card. Please call [facility] Cost of Care at [phone] if you need pricing information.     10. A Pre-op call nurse will call you the day before surgery to go over more details with you to prepare for surgery.    Call our office if you have any questions! Thank you!

## 2021-10-16 ENCOUNTER — LAB (OUTPATIENT)
Dept: FAMILY MEDICINE | Facility: CLINIC | Age: 36
End: 2021-10-16
Attending: COLON & RECTAL SURGERY
Payer: MEDICAID

## 2021-10-16 DIAGNOSIS — Z11.59 ENCOUNTER FOR SCREENING FOR OTHER VIRAL DISEASES: ICD-10-CM

## 2021-10-16 PROCEDURE — U0003 INFECTIOUS AGENT DETECTION BY NUCLEIC ACID (DNA OR RNA); SEVERE ACUTE RESPIRATORY SYNDROME CORONAVIRUS 2 (SARS-COV-2) (CORONAVIRUS DISEASE [COVID-19]), AMPLIFIED PROBE TECHNIQUE, MAKING USE OF HIGH THROUGHPUT TECHNOLOGIES AS DESCRIBED BY CMS-2020-01-R: HCPCS

## 2021-10-16 PROCEDURE — U0005 INFEC AGEN DETEC AMPLI PROBE: HCPCS

## 2021-10-17 LAB — SARS-COV-2 RNA RESP QL NAA+PROBE: NEGATIVE

## 2021-10-18 ENCOUNTER — ONCOLOGY VISIT (OUTPATIENT)
Dept: ONCOLOGY | Facility: CLINIC | Age: 36
End: 2021-10-18
Attending: NURSE PRACTITIONER
Payer: MEDICAID

## 2021-10-18 ENCOUNTER — TRANSFERRED RECORDS (OUTPATIENT)
Dept: HEALTH INFORMATION MANAGEMENT | Facility: CLINIC | Age: 36
End: 2021-10-18

## 2021-10-18 ENCOUNTER — LAB (OUTPATIENT)
Dept: INFUSION THERAPY | Facility: CLINIC | Age: 36
End: 2021-10-18
Attending: NURSE PRACTITIONER
Payer: MEDICAID

## 2021-10-18 VITALS
SYSTOLIC BLOOD PRESSURE: 102 MMHG | RESPIRATION RATE: 16 BRPM | OXYGEN SATURATION: 98 % | DIASTOLIC BLOOD PRESSURE: 71 MMHG | WEIGHT: 127.3 LBS | TEMPERATURE: 98.4 F | BODY MASS INDEX: 21.85 KG/M2 | HEART RATE: 78 BPM

## 2021-10-18 DIAGNOSIS — Z17.0 MALIGNANT NEOPLASM OF NIPPLE OF RIGHT BREAST IN FEMALE, ESTROGEN RECEPTOR POSITIVE (H): Primary | ICD-10-CM

## 2021-10-18 DIAGNOSIS — Z95.828 PORT-A-CATH IN PLACE: ICD-10-CM

## 2021-10-18 DIAGNOSIS — S31.000D WOUND OF SACRAL REGION, SUBSEQUENT ENCOUNTER: ICD-10-CM

## 2021-10-18 DIAGNOSIS — C50.011 MALIGNANT NEOPLASM OF NIPPLE OF RIGHT BREAST IN FEMALE, ESTROGEN RECEPTOR POSITIVE (H): Primary | ICD-10-CM

## 2021-10-18 DIAGNOSIS — Z51.11 ENCOUNTER FOR ANTINEOPLASTIC CHEMOTHERAPY: ICD-10-CM

## 2021-10-18 DIAGNOSIS — Z17.0 MALIGNANT NEOPLASM OF UPPER-INNER QUADRANT OF RIGHT BREAST IN FEMALE, ESTROGEN RECEPTOR POSITIVE (H): Primary | ICD-10-CM

## 2021-10-18 DIAGNOSIS — C50.211 MALIGNANT NEOPLASM OF UPPER-INNER QUADRANT OF RIGHT BREAST IN FEMALE, ESTROGEN RECEPTOR POSITIVE (H): Primary | ICD-10-CM

## 2021-10-18 LAB
ALBUMIN SERPL-MCNC: 4 G/DL (ref 3.5–5)
ALP SERPL-CCNC: 76 U/L (ref 45–120)
ALT SERPL W P-5'-P-CCNC: 30 U/L (ref 0–45)
ANION GAP SERPL CALCULATED.3IONS-SCNC: 11 MMOL/L (ref 5–18)
AST SERPL W P-5'-P-CCNC: 27 U/L (ref 0–40)
BASOPHILS # BLD AUTO: 0 10E3/UL (ref 0–0.2)
BASOPHILS NFR BLD AUTO: 1 %
BILIRUB SERPL-MCNC: 0.5 MG/DL (ref 0–1)
BUN SERPL-MCNC: 9 MG/DL (ref 8–22)
CALCIUM SERPL-MCNC: 9.6 MG/DL (ref 8.5–10.5)
CHLORIDE BLD-SCNC: 105 MMOL/L (ref 98–107)
CO2 SERPL-SCNC: 23 MMOL/L (ref 22–31)
CREAT SERPL-MCNC: 0.68 MG/DL (ref 0.6–1.1)
EOSINOPHIL # BLD AUTO: 0.2 10E3/UL (ref 0–0.7)
EOSINOPHIL NFR BLD AUTO: 4 %
ERYTHROCYTE [DISTWIDTH] IN BLOOD BY AUTOMATED COUNT: 12.4 % (ref 10–15)
GFR SERPL CREATININE-BSD FRML MDRD: >90 ML/MIN/1.73M2
GLUCOSE BLD-MCNC: 92 MG/DL (ref 70–125)
HCT VFR BLD AUTO: 36.7 % (ref 35–47)
HGB BLD-MCNC: 12 G/DL (ref 11.7–15.7)
HOLD SPECIMEN: NORMAL
IMM GRANULOCYTES # BLD: 0 10E3/UL
IMM GRANULOCYTES NFR BLD: 0 %
LYMPHOCYTES # BLD AUTO: 1.9 10E3/UL (ref 0.8–5.3)
LYMPHOCYTES NFR BLD AUTO: 43 %
MCH RBC QN AUTO: 30.5 PG (ref 26.5–33)
MCHC RBC AUTO-ENTMCNC: 32.7 G/DL (ref 31.5–36.5)
MCV RBC AUTO: 93 FL (ref 78–100)
MONOCYTES # BLD AUTO: 0.3 10E3/UL (ref 0–1.3)
MONOCYTES NFR BLD AUTO: 7 %
NEUTROPHILS # BLD AUTO: 2 10E3/UL (ref 1.6–8.3)
NEUTROPHILS NFR BLD AUTO: 45 %
NRBC # BLD AUTO: 0 10E3/UL
NRBC BLD AUTO-RTO: 0 /100
PLATELET # BLD AUTO: 195 10E3/UL (ref 150–450)
POTASSIUM BLD-SCNC: 3.8 MMOL/L (ref 3.5–5)
PROT SERPL-MCNC: 7 G/DL (ref 6–8)
RBC # BLD AUTO: 3.94 10E6/UL (ref 3.8–5.2)
SODIUM SERPL-SCNC: 139 MMOL/L (ref 136–145)
WBC # BLD AUTO: 4.5 10E3/UL (ref 4–11)

## 2021-10-18 PROCEDURE — 258N000003 HC RX IP 258 OP 636: Performed by: NURSE PRACTITIONER

## 2021-10-18 PROCEDURE — 99214 OFFICE O/P EST MOD 30 MIN: CPT | Performed by: NURSE PRACTITIONER

## 2021-10-18 PROCEDURE — 36591 DRAW BLOOD OFF VENOUS DEVICE: CPT

## 2021-10-18 PROCEDURE — G0463 HOSPITAL OUTPT CLINIC VISIT: HCPCS | Mod: 25

## 2021-10-18 PROCEDURE — 250N000011 HC RX IP 250 OP 636: Performed by: NURSE PRACTITIONER

## 2021-10-18 PROCEDURE — 96413 CHEMO IV INFUSION 1 HR: CPT

## 2021-10-18 PROCEDURE — 85025 COMPLETE CBC W/AUTO DIFF WBC: CPT | Performed by: NURSE PRACTITIONER

## 2021-10-18 PROCEDURE — 80053 COMPREHEN METABOLIC PANEL: CPT | Performed by: NURSE PRACTITIONER

## 2021-10-18 RX ORDER — DIPHENHYDRAMINE HYDROCHLORIDE 50 MG/ML
50 INJECTION INTRAMUSCULAR; INTRAVENOUS
Status: CANCELLED
Start: 2021-10-18

## 2021-10-18 RX ORDER — ACETAMINOPHEN 325 MG/1
650 TABLET ORAL
Status: CANCELLED
Start: 2021-10-18

## 2021-10-18 RX ORDER — LIDOCAINE AND PRILOCAINE 25; 25 MG/G; MG/G
CREAM TOPICAL DAILY PRN
Qty: 5 G | Refills: 1 | Status: SHIPPED | OUTPATIENT
Start: 2021-10-18 | End: 2021-11-11

## 2021-10-18 RX ORDER — EPINEPHRINE 1 MG/ML
0.3 INJECTION, SOLUTION INTRAMUSCULAR; SUBCUTANEOUS EVERY 5 MIN PRN
Status: CANCELLED | OUTPATIENT
Start: 2021-10-18

## 2021-10-18 RX ORDER — ALBUTEROL SULFATE 90 UG/1
1-2 AEROSOL, METERED RESPIRATORY (INHALATION)
Status: CANCELLED
Start: 2021-10-18

## 2021-10-18 RX ORDER — LORAZEPAM 2 MG/ML
0.5 INJECTION INTRAMUSCULAR EVERY 4 HOURS PRN
Status: CANCELLED
Start: 2021-10-18

## 2021-10-18 RX ORDER — ALBUTEROL SULFATE 0.83 MG/ML
2.5 SOLUTION RESPIRATORY (INHALATION)
Status: CANCELLED | OUTPATIENT
Start: 2021-10-18

## 2021-10-18 RX ORDER — DIPHENHYDRAMINE HCL 50 MG
50 CAPSULE ORAL
Status: CANCELLED | OUTPATIENT
Start: 2021-10-18

## 2021-10-18 RX ORDER — MEPERIDINE HYDROCHLORIDE 50 MG/ML
25 INJECTION INTRAMUSCULAR; INTRAVENOUS; SUBCUTANEOUS EVERY 30 MIN PRN
Status: CANCELLED | OUTPATIENT
Start: 2021-10-18

## 2021-10-18 RX ORDER — HEPARIN SODIUM (PORCINE) LOCK FLUSH IV SOLN 100 UNIT/ML 100 UNIT/ML
5 SOLUTION INTRAVENOUS
Status: DISCONTINUED | OUTPATIENT
Start: 2021-10-18 | End: 2021-10-18 | Stop reason: HOSPADM

## 2021-10-18 RX ORDER — METHYLPREDNISOLONE SODIUM SUCCINATE 125 MG/2ML
125 INJECTION, POWDER, LYOPHILIZED, FOR SOLUTION INTRAMUSCULAR; INTRAVENOUS
Status: CANCELLED
Start: 2021-10-18

## 2021-10-18 RX ORDER — HEPARIN SODIUM,PORCINE 10 UNIT/ML
5 VIAL (ML) INTRAVENOUS
Status: CANCELLED | OUTPATIENT
Start: 2021-10-18

## 2021-10-18 RX ORDER — NALOXONE HYDROCHLORIDE 0.4 MG/ML
0.2 INJECTION, SOLUTION INTRAMUSCULAR; INTRAVENOUS; SUBCUTANEOUS
Status: CANCELLED | OUTPATIENT
Start: 2021-10-18

## 2021-10-18 RX ORDER — HEPARIN SODIUM (PORCINE) LOCK FLUSH IV SOLN 100 UNIT/ML 100 UNIT/ML
5 SOLUTION INTRAVENOUS
Status: CANCELLED | OUTPATIENT
Start: 2021-10-18

## 2021-10-18 RX ADMIN — TRASTUZUMAB 348 MG: 150 INJECTION, POWDER, LYOPHILIZED, FOR SOLUTION INTRAVENOUS at 11:03

## 2021-10-18 RX ADMIN — HEPARIN SODIUM (PORCINE) LOCK FLUSH IV SOLN 100 UNIT/ML 5 ML: 100 SOLUTION at 11:32

## 2021-10-18 ASSESSMENT — PAIN SCALES - GENERAL: PAINLEVEL: WORST PAIN (10)

## 2021-10-18 NOTE — PATIENT INSTRUCTIONS
Recent Results (from the past 24 hour(s))   CBC with platelets and differential    Collection Time: 10/18/21  9:25 AM   Result Value Ref Range    WBC Count 4.5 4.0 - 11.0 10e3/uL    RBC Count 3.94 3.80 - 5.20 10e6/uL    Hemoglobin 12.0 11.7 - 15.7 g/dL    Hematocrit 36.7 35.0 - 47.0 %    MCV 93 78 - 100 fL    MCH 30.5 26.5 - 33.0 pg    MCHC 32.7 31.5 - 36.5 g/dL    RDW 12.4 10.0 - 15.0 %    Platelet Count 195 150 - 450 10e3/uL    % Neutrophils 45 %    % Lymphocytes 43 %    % Monocytes 7 %    % Eosinophils 4 %    % Basophils 1 %    % Immature Granulocytes 0 %    NRBCs per 100 WBC 0 <1 /100    Absolute Neutrophils 2.0 1.6 - 8.3 10e3/uL    Absolute Lymphocytes 1.9 0.8 - 5.3 10e3/uL    Absolute Monocytes 0.3 0.0 - 1.3 10e3/uL    Absolute Eosinophils 0.2 0.0 - 0.7 10e3/uL    Absolute Basophils 0.0 0.0 - 0.2 10e3/uL    Absolute Immature Granulocytes 0.0 <=0.0 10e3/uL    Absolute NRBCs 0.0 10e3/uL

## 2021-10-18 NOTE — PROGRESS NOTES
[facility] Hematology and Oncology Progress Note    Patient: Joanne Colon  MRN: [id number]  Date of Service: Oct 18, 2021         Reason for Visit:      Follow up trastuzumab and pertuzumab therapies     Assessment and Plan:     1.           cT2 N1 M0 invasive ductal carcinoma (R) breast, grade 3, ER positive, OH positive, HER-2 positive.    36 year old     Ms. Joanne Colon presents with her sister, Shadi, and no Khmer .  Other than her intermittent rectal discomfort, she looks and feels quite good.  Once completing the TC chemotherapy the severe diarrhea subsided and her stools are now soft and formed.  She has an open sore over the sacrum with yellow/brown mucoid drainage which smells of stool.  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  She was previously taking Tylenol for the rectal discomfort but states that although the discomfort can be quite severe at times, it is so intemittent that she even stopped taking the Tylenol.  No recent heartburn - previously managed with omeprazole 20 mg 1-2 x daily.  No recent nausea.  Her appetite is good and weight stable with start of treatment weight.  No fever.  No mouth sores.  Breast mass not palpable.  She denies cough, fever, chills, unusual headaches, visual or mentation disturbance, bladder issues, rash, neuropathies.    CBC - WNL.     CMP - WNL.    No nausea - has Rx for Zofran 4 mg every 8 hours if needed.    No GERD - has omeprazole 20 mg 1-2 x daily prn if needed.      No diarrhea - resolved with completing adjuvant chemotherapy.      Pain - r/t sacral wound:    Very intermittent.    Managed with Tylenol prn.  Reviewed dose limit of 3 grams/day.    Refuses narcotic Rx.    With potential colocutaneous fistula and planned surgery for later this week will proceed with the trastuzumab today but continue to HOLD the pertuzumab component of therapy as it has associated neutropenia @ 47% to  53%.    Sacral wound:    Stopped using the Triad paste but keeping the area clean with daily cleansing.      Instructed that she needs to contact us with a fever >100.4F as antibiotics or even hospitalization may be indicated..      Joanne has received both Pfizer SARScovid.19 vaccines.  Not currently interested in the seasonal flu vaccine.    10/06 surgery postponed to 11/18/21 for desired bilateral mastectomy and right sentinel lymph node biopsy.  Now desires lumpectomy and would like appointment moved up as much as possible.    We will contact Dr Perdomo with patient desires.    11/08/21 - follow up next cycle trastuzumab + pertuzumab therapies with labs per Treatment Plan.     Oncologic History:     1.           cT2 N1 M0 invasive ductal carcinoma (R) breast, grade 3, ER positive, AK positive, HER-2 positive.    2021, April - presented to the ED with a 3-month history of a painful mass in her right breast.  No nipple changes or discharge.    ? 04/13/21 diagnostic mammogram and ultrasound - confirmed a 2.2 x 2.8 x 1.4 cm right breast mass in the 8 o'clock position, 5 cm from the nipple with abnormal appearing right axillary lymph node of 1.7 cm with cortical thickness.    ? 04/13/21 US-guided core needle biopsy - confirmed invasive ductal carcinoma, grade 3, ER moderate to strong positive, AK strongly positive, HER-2 positive by IHC.  ? Consult with Dr. Perdomo, surgeon - recommended neoadjuvant chemotherapy.    05/13 - 09/02/2021 completed 6 cycles neoadjuvant TCHP [AUC 5] chemotherapy.    ? Required hospitalization with neutropenic fever, neutropenic colitis after cycle #1.    ? Neulasta added starting cycle #2 chemotherapy.    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    ECOG Performance:    0 - Independent    Pain:    Pain Score: Worst Pain (10) (Rectal area) - see above.    Encounter Diagnoses:    Problem List Items Addressed This Visit        Other    Encounter for  antineoplastic chemotherapy    Relevant Orders    CBC with platelets and differential (Completed)    Comprehensive metabolic panel (Completed)      Other Visit Diagnoses     Malignant neoplasm of upper-inner quadrant of right breast in female, estrogen receptor positive (H)    -  Primary    Relevant Orders    CBC with platelets and differential (Completed)    Comprehensive metabolic panel (Completed)    Wound of sacral region, subsequent encounter        Port-A-Cath in place        Relevant Medications    study - lidocaine/prilocaine, IDS# [id number], 2.5-2.5 % external cream             Total time 34 minutes.    Td Padilla CNP     CC: Ye Lira MD   ______________________________________________________________________________    Review of Systems:    No fever or night sweats.  No loss of weight.  No lumps or bumps anywhere.  No unusual headaches or eyesight issues.  No dizziness.  No bleeding from the nose.  No sores in the mouth. No problems with swallowing.  No chest pain. No shortness of breath. No cough.  No abdominal pain. No nausea or vomiting.  No diarrhea or constipation.    No blood in stool or black colored stools.  No problems passing urine.  No numbness or tingling in hands or feet.  No skin rashes.    A 14 point review of systems is otherwise negative.    Past History:    Past Medical History:   Diagnosis Date     Breast cancer (H)      Breast mass, right 4/9/2021    Dx April 2021      Chemotherapy induced nausea and vomiting 7/18/2021     Chemotherapy-induced neutropenia (H)      Colitis, acute      Encounter for antineoplastic chemotherapy 4/22/2021     Fever and chills 5/22/2021     Functional diarrhea 7/18/2021     Hepatitis      Lesion of liver less than 1 cm in diameter      Malignant neoplasm of nipple of right breast in female, estrogen receptor positive (H) 4/22/2021     Mucositis due to chemotherapy      Neutropenic fever (H)      Neutropenic sepsis (H) 5/23/2021       Past Surgical  History:   Procedure Laterality Date      SECTION      x two     IR CHEST PORT PLACEMENT > 5 YRS OF AGE  2021     IR PORT PLACEMENT >5 YEARS  2021     US BIOPSY FINE NEEDLE ASPIRATION LYMPH NODE (BREAST) RIGHT Right 2021     US BREAST CORE BIOPSY RIGHT Right 2021     Physical Exam:    /71   Pulse 78   Temp 98.4  F (36.9  C)   Resp 16   Wt 57.7 kg (127 lb 4.8 oz)   SpO2 98%   BMI 21.85 kg/m      GENERAL:       Alert and oriented.  Seated comfortably.  No acute distress.     HEENT:             Atraumatic and normocephalic.  LISSETH.  EOMI.  No pallor.  No icterus.  No mucosal lesions.     LYMPH NODES:  No palpable, cervical, axillary or inguinal lymphadenopathy.     CHEST:               Lungs clear to auscultation bilaterally.    Port-a-cath in place - working well.    BREAST:  (R) - no palpable lump or concerning skin/nipple changes.     CVS:                      S1 and S2 heard.  Regular rate and rhythm.  No murmur, gallop or rub heard.  No peripheral edema.     ABDOMEN:          Soft.  Not tender.  Not distended.  No palpable hepatomegaly or splenomegaly, masses or ascites.  Bowel sounds heard.     EXTREMITIES Warm.     SKIN:              No rash, bruising or purpura noted.  Partial alopecia.    Lab Results:    Reviewed with patient and her sister.    Recent Results (from the past 24 hour(s))   Extra Red Top Tube    Collection Time: 10/18/21  9:25 AM   Result Value Ref Range    Hold Specimen JIC    Extra Green Top (Lithium Heparin) Tube    Collection Time: 10/18/21  9:25 AM   Result Value Ref Range    Hold Specimen JIC    Extra Purple Top Tube    Collection Time: 10/18/21  9:25 AM   Result Value Ref Range    Hold Specimen JI    Comprehensive metabolic panel    Collection Time: 10/18/21  9:25 AM   Result Value Ref Range    Sodium 139 136 - 145 mmol/L    Potassium 3.8 3.5 - 5.0 mmol/L    Chloride 105 98 - 107 mmol/L    Carbon Dioxide (CO2) 23 22 - 31 mmol/L    Anion Gap 11 5 - 18  mmol/L    Urea Nitrogen 9 8 - 22 mg/dL    Creatinine 0.68 0.60 - 1.10 mg/dL    Calcium 9.6 8.5 - 10.5 mg/dL    Glucose 92 70 - 125 mg/dL    Alkaline Phosphatase 76 45 - 120 U/L    AST 27 0 - 40 U/L    ALT 30 0 - 45 U/L    Protein Total 7.0 6.0 - 8.0 g/dL    Albumin 4.0 3.5 - 5.0 g/dL    Bilirubin Total 0.5 0.0 - 1.0 mg/dL    GFR Estimate >90 >60 mL/min/1.73m2   CBC with platelets and differential    Collection Time: 10/18/21  9:25 AM   Result Value Ref Range    WBC Count 4.5 4.0 - 11.0 10e3/uL    RBC Count 3.94 3.80 - 5.20 10e6/uL    Hemoglobin 12.0 11.7 - 15.7 g/dL    Hematocrit 36.7 35.0 - 47.0 %    MCV 93 78 - 100 fL    MCH 30.5 26.5 - 33.0 pg    MCHC 32.7 31.5 - 36.5 g/dL    RDW 12.4 10.0 - 15.0 %    Platelet Count 195 150 - 450 10e3/uL    % Neutrophils 45 %    % Lymphocytes 43 %    % Monocytes 7 %    % Eosinophils 4 %    % Basophils 1 %    % Immature Granulocytes 0 %    NRBCs per 100 WBC 0 <1 /100    Absolute Neutrophils 2.0 1.6 - 8.3 10e3/uL    Absolute Lymphocytes 1.9 0.8 - 5.3 10e3/uL    Absolute Monocytes 0.3 0.0 - 1.3 10e3/uL    Absolute Eosinophils 0.2 0.0 - 0.7 10e3/uL    Absolute Basophils 0.0 0.0 - 0.2 10e3/uL    Absolute Immature Granulocytes 0.0 <=0.0 10e3/uL    Absolute NRBCs 0.0 10e3/uL     Imaging:    No results found.

## 2021-10-18 NOTE — PROGRESS NOTES
"Oncology Rooming Note    October 18, 2021 9:40 AM   Joanne Colon is a 36 year old female who presents for:    Chief Complaint   Patient presents with     Oncology Clinic Visit     Initial Vitals: /71   Pulse 78   Temp 98.4  F (36.9  C)   Resp 16   Wt 57.7 kg (127 lb 4.8 oz)   SpO2 98%   BMI 21.85 kg/m   Estimated body mass index is 21.85 kg/m  as calculated from the following:    Height as of 9/27/21: 1.626 m (5' 4\").    Weight as of this encounter: 57.7 kg (127 lb 4.8 oz). Body surface area is 1.61 meters squared.  Worst Pain (10) Comment: Rectal area   No LMP recorded. (Menstrual status: Chemotherapy).  Allergies reviewed: Yes  Medications reviewed: Yes    Medications: Medication refills not needed today.  Pharmacy name entered into [facility]: [id number] [place], [place] - [address] AT [facility]    Clinical concerns: None       Lacie Resendez LPN            "

## 2021-10-18 NOTE — LETTER
"    10/18/2021         RE: Joanne Colon  [address]  [place] [address]        Dear Colleague,    Thank you for referring your patient, Joanne Colon, to the [facility] CANCER [facility]. Please see a copy of my visit note below.    Oncology Rooming Note    October 18, 2021 9:40 AM   Joanne Colon is a 36 year old female who presents for:    Chief Complaint   Patient presents with     Oncology Clinic Visit     Initial Vitals: /71   Pulse 78   Temp 98.4  F (36.9  C)   Resp 16   Wt 57.7 kg (127 lb 4.8 oz)   SpO2 98%   BMI 21.85 kg/m   Estimated body mass index is 21.85 kg/m  as calculated from the following:    Height as of 9/27/21: 1.626 m (5' 4\").    Weight as of this encounter: 57.7 kg (127 lb 4.8 oz). Body surface area is 1.61 meters squared.  Worst Pain (10) Comment: Rectal area   No LMP recorded. (Menstrual status: Chemotherapy).  Allergies reviewed: Yes  Medications reviewed: Yes    Medications: Medication refills not needed today.  Pharmacy name entered into [employer]: [id number] [place], [place] - [address] [facility]  [facility]    Clinical concerns: None       Lacie Resendez LPN              [facility] Hematology and Oncology Progress Note    Patient: Joanne Colon  MRN: [id number]  Date of Service: Oct 18, 2021         Reason for Visit:      Follow up trastuzumab and pertuzumab therapies     Assessment and Plan:     1.           cT2 N1 M0 invasive ductal carcinoma (R) breast, grade 3, ER positive, IA positive, HER-2 positive.    36 year old     Ms. Joanne Colon presents with her sister, Shadi, and no Malay .  Other than her intermittent rectal discomfort, she looks and feels quite good.  Once completing the TCHP chemotherapy the severe diarrhea subsided and her stools are now soft and formed.  She has an open sore over the sacrum with yellow/brown mucoid drainage which smells of stool.  She was seen by [facility] with concern of a " colocutaneous fistula with surgery planned for this Wednesday.  She was previously taking Tylenol for the rectal discomfort but states that although the discomfort can be quite severe at times, it is so intemittent that she even stopped taking the Tylenol.  No recent heartburn - previously managed with omeprazole 20 mg 1-2 x daily.  No recent nausea.  Her appetite is good and weight stable with start of treatment weight.  No fever.  No mouth sores.  Breast mass not palpable.  She denies cough, fever, chills, unusual headaches, visual or mentation disturbance, bladder issues, rash, neuropathies.    CBC - WNL.     CMP - WNL.    No nausea - has Rx for Zofran 4 mg every 8 hours if needed.    No GERD - has omeprazole 20 mg 1-2 x daily prn if needed.      No diarrhea - resolved with completing adjuvant chemotherapy.      Pain - r/t sacral wound:    Very intermittent.    Managed with Tylenol prn.  Reviewed dose limit of 3 grams/day.    Refuses narcotic Rx.    With potential colocutaneous fistula and planned surgery for later this week will proceed with the trastuzumab today but continue to HOLD the pertuzumab component of therapy as it has associated neutropenia @ 47% to 53%.    Sacral wound:    Stopped using the Triad paste but keeping the area clean with daily cleansing.      Instructed that she needs to contact us with a fever >100.4F as antibiotics or even hospitalization may be indicated..      Joanne has received both Pfizer SARScovid.19 vaccines.  Not currently interested in the seasonal flu vaccine.    10/06 surgery postponed to 11/18/21 for desired bilateral mastectomy and right sentinel lymph node biopsy.  Now desires lumpectomy and would like appointment moved up as much as possible.    We will contact Dr Perdomo with patient desires.    11/08/21 - follow up next cycle trastuzumab + pertuzumab therapies with labs per Treatment Plan.     Oncologic History:     1.           cT2 N1 M0 invasive ductal carcinoma (R)  breast, grade 3, ER positive, NH positive, HER-2 positive.    2021, April - presented to the ED with a 3-month history of a painful mass in her right breast.  No nipple changes or discharge.    ? 04/13/21 diagnostic mammogram and ultrasound - confirmed a 2.2 x 2.8 x 1.4 cm right breast mass in the 8 o'clock position, 5 cm from the nipple with abnormal appearing right axillary lymph node of 1.7 cm with cortical thickness.    ? 04/13/21 US-guided core needle biopsy - confirmed invasive ductal carcinoma, grade 3, ER moderate to strong positive, NH strongly positive, HER-2 positive by IHC.  ? Consult with Dr. Perdomo, surgeon - recommended neoadjuvant chemotherapy.    05/13 - 09/02/2021 completed 6 cycles neoadjuvant TCHP [AUC 5] chemotherapy.    ? Required hospitalization with neutropenic fever, neutropenic colitis after cycle #1.    ? Neulasta added starting cycle #2 chemotherapy.    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    ECOG Performance:    0 - Independent    Pain:    Pain Score: Worst Pain (10) (Rectal area) - see above.    Encounter Diagnoses:    Problem List Items Addressed This Visit        Other    Encounter for antineoplastic chemotherapy    Relevant Orders    CBC with platelets and differential (Completed)    Comprehensive metabolic panel (Completed)      Other Visit Diagnoses     Malignant neoplasm of upper-inner quadrant of right breast in female, estrogen receptor positive (H)    -  Primary    Relevant Orders    CBC with platelets and differential (Completed)    Comprehensive metabolic panel (Completed)    Wound of sacral region, subsequent encounter        Port-A-Cath in place        Relevant Medications    study - lidocaine/prilocaine, IDS# [id number], 2.5-2.5 % external cream             Total time 34 minutes.    Td Padilla, CNP     CC: Ye Lira MD   ______________________________________________________________________________    Review of  Systems:    No fever or night sweats.  No loss of weight.  No lumps or bumps anywhere.  No unusual headaches or eyesight issues.  No dizziness.  No bleeding from the nose.  No sores in the mouth. No problems with swallowing.  No chest pain. No shortness of breath. No cough.  No abdominal pain. No nausea or vomiting.  No diarrhea or constipation.    No blood in stool or black colored stools.  No problems passing urine.  No numbness or tingling in hands or feet.  No skin rashes.    A 14 point review of systems is otherwise negative.    Past History:    Past Medical History:   Diagnosis Date     Breast cancer (H)      Breast mass, right 2021    Dx 2021      Chemotherapy induced nausea and vomiting 2021     Chemotherapy-induced neutropenia (H)      Colitis, acute      Encounter for antineoplastic chemotherapy 2021     Fever and chills 2021     Functional diarrhea 2021     Hepatitis      Lesion of liver less than 1 cm in diameter      Malignant neoplasm of nipple of right breast in female, estrogen receptor positive (H) 2021     Mucositis due to chemotherapy      Neutropenic fever (H)      Neutropenic sepsis (H) 2021       Past Surgical History:   Procedure Laterality Date      SECTION      x two     IR CHEST PORT PLACEMENT > 5 YRS OF AGE  2021     IR PORT PLACEMENT >5 YEARS  2021     US BIOPSY FINE NEEDLE ASPIRATION LYMPH NODE (BREAST) RIGHT Right 2021     US BREAST CORE BIOPSY RIGHT Right 2021     Physical Exam:    /71   Pulse 78   Temp 98.4  F (36.9  C)   Resp 16   Wt 57.7 kg (127 lb 4.8 oz)   SpO2 98%   BMI 21.85 kg/m      GENERAL:       Alert and oriented.  Seated comfortably.  No acute distress.     HEENT:             Atraumatic and normocephalic.  LISSETH.  EOMI.  No pallor.  No icterus.  No mucosal lesions.     LYMPH NODES:  No palpable, cervical, axillary or inguinal lymphadenopathy.     CHEST:               Lungs clear to auscultation  bilaterally.    Port-a-cath in place - working well.    BREAST:  (R) - no palpable lump or concerning skin/nipple changes.     CVS:                      S1 and S2 heard.  Regular rate and rhythm.  No murmur, gallop or rub heard.  No peripheral edema.     ABDOMEN:          Soft.  Not tender.  Not distended.  No palpable hepatomegaly or splenomegaly, masses or ascites.  Bowel sounds heard.     EXTREMITIES Warm.     SKIN:              No rash, bruising or purpura noted.  Partial alopecia.    Lab Results:    Reviewed with patient and her sister.    Recent Results (from the past 24 hour(s))   Extra Red Top Tube    Collection Time: 10/18/21  9:25 AM   Result Value Ref Range    Hold Specimen JIC    Extra Green Top (Lithium Heparin) Tube    Collection Time: 10/18/21  9:25 AM   Result Value Ref Range    Hold Specimen JIC    Extra Purple Top Tube    Collection Time: 10/18/21  9:25 AM   Result Value Ref Range    Hold Specimen JIC    Comprehensive metabolic panel    Collection Time: 10/18/21  9:25 AM   Result Value Ref Range    Sodium 139 136 - 145 mmol/L    Potassium 3.8 3.5 - 5.0 mmol/L    Chloride 105 98 - 107 mmol/L    Carbon Dioxide (CO2) 23 22 - 31 mmol/L    Anion Gap 11 5 - 18 mmol/L    Urea Nitrogen 9 8 - 22 mg/dL    Creatinine 0.68 0.60 - 1.10 mg/dL    Calcium 9.6 8.5 - 10.5 mg/dL    Glucose 92 70 - 125 mg/dL    Alkaline Phosphatase 76 45 - 120 U/L    AST 27 0 - 40 U/L    ALT 30 0 - 45 U/L    Protein Total 7.0 6.0 - 8.0 g/dL    Albumin 4.0 3.5 - 5.0 g/dL    Bilirubin Total 0.5 0.0 - 1.0 mg/dL    GFR Estimate >90 >60 mL/min/1.73m2   CBC with platelets and differential    Collection Time: 10/18/21  9:25 AM   Result Value Ref Range    WBC Count 4.5 4.0 - 11.0 10e3/uL    RBC Count 3.94 3.80 - 5.20 10e6/uL    Hemoglobin 12.0 11.7 - 15.7 g/dL    Hematocrit 36.7 35.0 - 47.0 %    MCV 93 78 - 100 fL    MCH 30.5 26.5 - 33.0 pg    MCHC 32.7 31.5 - 36.5 g/dL    RDW 12.4 10.0 - 15.0 %    Platelet Count 195 150 - 450 10e3/uL    %  Neutrophils 45 %    % Lymphocytes 43 %    % Monocytes 7 %    % Eosinophils 4 %    % Basophils 1 %    % Immature Granulocytes 0 %    NRBCs per 100 WBC 0 <1 /100    Absolute Neutrophils 2.0 1.6 - 8.3 10e3/uL    Absolute Lymphocytes 1.9 0.8 - 5.3 10e3/uL    Absolute Monocytes 0.3 0.0 - 1.3 10e3/uL    Absolute Eosinophils 0.2 0.0 - 0.7 10e3/uL    Absolute Basophils 0.0 0.0 - 0.2 10e3/uL    Absolute Immature Granulocytes 0.0 <=0.0 10e3/uL    Absolute NRBCs 0.0 10e3/uL     Imaging:    No results found.      Again, thank you for allowing me to participate in the care of your patient.        Sincerely,        Td Padilla, CNP

## 2021-10-18 NOTE — TELEPHONE ENCOUNTER
Spoke to Mili over the phone today to schedule surgery with Dr. Perdomo at [facility] on 11/15.      Breast Center time 8 am  Covid Testing 11/11/2021 at 1pm [facility]  Post Op- 11/23 at 3:40pm at [facility]    Letter sent via [facility]

## 2021-10-19 ENCOUNTER — OFFICE VISIT (OUTPATIENT)
Dept: FAMILY MEDICINE | Facility: CLINIC | Age: 36
End: 2021-10-19
Payer: MEDICAID

## 2021-10-19 VITALS
HEART RATE: 80 BPM | SYSTOLIC BLOOD PRESSURE: 96 MMHG | DIASTOLIC BLOOD PRESSURE: 60 MMHG | HEIGHT: 59 IN | WEIGHT: 129.1 LBS | BODY MASS INDEX: 26.03 KG/M2

## 2021-10-19 DIAGNOSIS — Z01.818 PREOP GENERAL PHYSICAL EXAM: Primary | ICD-10-CM

## 2021-10-19 DIAGNOSIS — Z17.0 MALIGNANT NEOPLASM OF NIPPLE OF RIGHT BREAST IN FEMALE, ESTROGEN RECEPTOR POSITIVE (H): ICD-10-CM

## 2021-10-19 DIAGNOSIS — C50.011 MALIGNANT NEOPLASM OF NIPPLE OF RIGHT BREAST IN FEMALE, ESTROGEN RECEPTOR POSITIVE (H): ICD-10-CM

## 2021-10-19 DIAGNOSIS — K60.30 ANAL FISTULA: ICD-10-CM

## 2021-10-19 PROCEDURE — 99214 OFFICE O/P EST MOD 30 MIN: CPT | Performed by: FAMILY MEDICINE

## 2021-10-19 ASSESSMENT — MIFFLIN-ST. JEOR: SCORE: 1178.34

## 2021-10-19 NOTE — H&P (VIEW-ONLY)
[facility]  [address] [place] [address]  Phone: [phone]  Fax: [phone]  Primary Provider: Ye Lira  Pre-op Performing Provider:    MELINDA POLLOCK    PREOPERATIVE EVALUATION:  Today's date: 10/19/2021    Joanne Colon is a 36 year old female who presents for a preoperative evaluation.    Surgical Information:  1  Surgery/Procedure: Fistulectomy   Surgery with [facility] (10/20/21) at [facility]    Surgeon: Dr Zelaya.  Surgery Date: 10/20/2021  Time:1.30 pm  Where patient plans to recover: At home  Fax Number for Surgical Facility:[facility]: Fax                          Note does not need to be faxed, will be available electronically in Epic.     2  Surgery/Procedure: Right lumpectomy after Wire Localization,Orange Lymph Node Biopsy  Surgery Location: [facility] -11/15/2021  Surgeon: Dr. Delaney Perdomo MD (11/15/2021)  Surgery Date: 11/15/2021  Time of Surgery:10:30am (11/15)  Where patient plans to recover: At home with family  Fax number for surgical facility: [facility].  Note does not need to be faxed, will be available electronically in Epic.    Type of Anesthesia Anticipated: General    Assessment & Plan     The proposed surgical procedure is considered LOW risk.    Preop general physical exam  Anal fistula    Malignant neoplasm of nipple of right breast in female, estrogen receptor positive (H)    There is no contraindications to the planned surgery.       Risks and Recommendations:  The patient has the following additional risks and recommendations for perioperative complications:   - No identified additional risk factors other than previously addressed    Medication Instructions:  She is not on any medications.    RECOMMENDATION:  APPROVAL GIVEN to proceed with proposed procedure, without further diagnostic evaluation.      Subjective     HPI related to upcoming procedure:   She has a history of  malignant neoplasia of the nipple of the right breast which is estrogen receptor positive.  She has been on treatment for that with chemotherapy.  She also was noted to have perianal abscess with a fistula for which she is going to have a fistulectomy done.  She is also going to be having a lumpectomy on the right breast with sentinel lymph node biopsy which will be done in 3 weeks.  Today she does not have any concerns.  She feels well.  And noted no symptoms no chest pain or shortness of breath.  Noted no headaches.  Full review of system was done and was negative.    Preop Questions 10/19/2021   1. Have you ever had a heart attack or stroke? No   2. Have you ever had surgery on your heart or blood vessels, such as a stent placement, a coronary artery bypass, or surgery on an artery in your head, neck, heart, or legs? No   3. Do you have chest pain with activity? No   4. Do you have a history of  heart failure? No   5. Do you currently have a cold, bronchitis or symptoms of other infection? No   6. Do you have a cough, shortness of breath, or wheezing? No   7. Do you or anyone in your family have previous history of blood clots? No   8. Do you or does anyone in your family have a serious bleeding problem such as prolonged bleeding following surgeries or cuts? No   9. Have you ever had problems with anemia or been told to take iron pills? No   10. Have you had any abnormal blood loss such as black, tarry or bloody stools, or abnormal vaginal bleeding? No   11. Have you ever had a blood transfusion? No   12. Are you willing to have a blood transfusion if it is medically needed before, during, or after your surgery? Yes, will prefer Family.   13. Have you or any of your relatives ever had problems with anesthesia? No   14. Do you have sleep apnea, excessive snoring or daytime drowsiness? No   15. Do you have any artifical heart valves or other implanted medical devices like a pacemaker, defibrillator, or continuous  glucose monitor? No   16. Do you have artificial joints? No   17. Are you allergic to latex? No   18. Is there any chance that you may be pregnant? No       Health Care Directive:  Patient does not have a Health Care Directive or Living Will: Discussed advance care planning with patient; information given to patient to review.     Preoperative Review of :   reviewed - controlled substances reflected in medication list.      Status of Chronic Conditions:  See problem list for active medical problems.  Problems all longstanding and stable, except as noted/documented.  See ROS for pertinent symptoms related to these conditions.      Review of Systems  CONSTITUTIONAL: NEGATIVE for fever, chills, change in weight  INTEGUMENTARY/SKIN: NEGATIVE for worrisome rashes, moles or lesions  EYES: NEGATIVE for vision changes or irritation  ENT/MOUTH: NEGATIVE for ear, mouth and throat problems  RESP: NEGATIVE for significant cough or SOB  CV: NEGATIVE for chest pain, palpitations or peripheral edema  GI: NEGATIVE for nausea, abdominal pain, heartburn, or change in bowel habits  : NEGATIVE for frequency, dysuria, or hematuria  MUSCULOSKELETAL: NEGATIVE for significant arthralgias or myalgia  NEURO: NEGATIVE for weakness, dizziness or paresthesias  ENDOCRINE: NEGATIVE for temperature intolerance, skin/hair changes  HEME: NEGATIVE for bleeding problems  PSYCHIATRIC: NEGATIVE for changes in mood or affect    Patient Active Problem List    Diagnosis Date Noted     Functional diarrhea 07/18/2021     Priority: Medium     Chemotherapy induced nausea and vomiting 07/18/2021     Priority: Medium     Neutropenic sepsis (H) 05/23/2021     Priority: Medium     Neutropenic fever (H)      Priority: Medium     Hepatitis      Priority: Medium     Mucositis due to chemotherapy      Priority: Medium     Lesion of liver less than 1 cm in diameter      Priority: Medium     Chemotherapy-induced neutropenia (H)      Priority: Medium     Fever  and chills 2021     Priority: Medium     Malignant neoplasm of nipple of right breast in female, estrogen receptor positive (H) 2021     Priority: Medium     Encounter for antineoplastic chemotherapy 2021     Priority: Medium     Breast mass, right 2021     Priority: Medium     Dx 2021          Past Medical History:   Diagnosis Date     Breast cancer (H)      Breast mass, right 2021    Dx 2021      Chemotherapy induced nausea and vomiting 2021     Chemotherapy-induced neutropenia (H)      Colitis, acute      Encounter for antineoplastic chemotherapy 2021     Fever and chills 2021     Functional diarrhea 2021     Hepatitis      Lesion of liver less than 1 cm in diameter      Malignant neoplasm of nipple of right breast in female, estrogen receptor positive (H) 2021     Mucositis due to chemotherapy      Neutropenic fever (H)      Neutropenic sepsis (H) 2021     Past Surgical History:   Procedure Laterality Date      SECTION      x two     IR CHEST PORT PLACEMENT > 5 YRS OF AGE  2021     IR PORT PLACEMENT >5 YEARS  2021     US BIOPSY FINE NEEDLE ASPIRATION LYMPH NODE (BREAST) RIGHT Right 2021     US BREAST CORE BIOPSY RIGHT Right 2021     Current Outpatient Medications   Medication Sig Dispense Refill     acetaminophen (TYLENOL) 500 MG tablet Take 500 mg by mouth every 6 hours as needed for mild pain  (Patient not taking: Reported on 2021)       calcium carbonate (TUMS) 500 MG chewable tablet Take 1 chew tab by mouth 2 times daily as needed for heartburn  (Patient not taking: Reported on 10/18/2021)       dexamethasone (DECADRON) 4 MG tablet Take 4 mg by mouth 2 times daily (with meals) Taking 8mg every 12hours for 3 days starting the day before chemo (Patient not taking: Reported on 2021)       gentamicin (GARAMYCIN) 0.1 % external ointment Apply topically 3 times daily Apply 0.25gram to buttocks wound 3 times  "per day for 14 days (Patient not taking: Reported on 10/18/2021) 30 g 1     gentamicin (GARAMYCIN) 0.1 % external ointment Apply topically 3 times daily Apply topically 3 times daily Apply 0.25gram to buttocks wound 3 times per day for 14 days - Topical (Patient not taking: Reported on 10/18/2021) 15 g 0     loperamide (IMODIUM) 2 MG capsule Take 2 mg by mouth 4 times daily as needed for diarrhea  (Patient not taking: Reported on 10/18/2021)       magic mouthwash suspension (diphenhydrAMINE, lidocaine, aluminum-magnesium & simethicone) Swish and swallow 10 mLs in mouth every 6 hours as needed for mouth sores  (Patient not taking: Reported on 10/18/2021)       ondansetron (ZOFRAN) 4 MG tablet Take 1 tablet (4 mg) by mouth every 8 hours as needed for nausea (Patient not taking: Reported on 9/27/2021) 20 tablet 1     prochlorperazine (COMPAZINE) 10 MG tablet Take 10 mg by mouth  (Patient not taking: Reported on 9/27/2021)       study - lidocaine/prilocaine, IDS# [id number], 2.5-2.5 % external cream Apply topically daily as needed for moderate pain Apply topically as directed prior to blood draw. (Patient not taking: Reported on 10/19/2021) 5 g 1       No Known Allergies     Social History     Tobacco Use     Smoking status: Never Smoker     Smokeless tobacco: Never Used   Substance Use Topics     Alcohol use: Not Currently     Family History   Problem Relation Age of Onset     Diabetes Type 2  Mother      Diabetes Type 2  Father      Breast Cancer No family hx of      History   Drug Use Unknown         Objective     BP 96/60 (BP Location: Right arm, Patient Position: Sitting, Cuff Size: Adult Regular)   Pulse 80   Ht 1.51 m (4' 11.45\")   Wt 58.6 kg (129 lb 1.6 oz)   Breastfeeding No   BMI 25.68 kg/m      Physical Exam    GENERAL APPEARANCE: healthy, alert and no distress     EYES: EOMI, PERRL     HENT: ear canals and TM's normal and nose and mouth without ulcers or lesions     NECK: no adenopathy, no asymmetry, masses, " or scars and thyroid normal to palpation     RESP: lungs clear to auscultation - no rales, rhonchi or wheezes     CV: regular rates and rhythm, normal S1 S2, no S3 or S4 and no murmur, click or rub     ABDOMEN:  soft, nontender, no HSM or masses and bowel sounds normal     MS: extremities normal- no gross deformities noted, no evidence of inflammation in joints, FROM in all extremities.     SKIN: no suspicious lesions or rashes     NEURO: Normal strength and tone, sensory exam grossly normal, mentation intact and speech normal     PSYCH: mentation appears normal. and affect normal/bright     LYMPHATICS: No cervical adenopathy    Recent Labs   Lab Test 10/18/21  0925 09/27/21  0949 05/23/21  0510 05/23/21  0051   HGB 12.0 11.4*   < > 11.3*    211   < > 187   INR  --   --   --  1.21*    139   < > 135*   POTASSIUM 3.8 3.9   < > 3.1*   CR 0.68 0.64   < > 0.64    < > = values in this interval not displayed.        Diagnostics:  No labs were ordered during this visit.  But she does have lab that was done yesterday with normal hemogram.  No EKG required, no history of coronary heart disease, significant arrhythmia, peripheral arterial disease or other structural heart disease.    Revised Cardiac Risk Index (RCRI):  The patient has the following serious cardiovascular risks for perioperative complications:   - No serious cardiac risks = 0 points     RCRI Interpretation: 0 points: Class I (very low risk - 0.4% complication rate)           Signed Electronically by: Que Berry MD  Copy of this evaluation report is provided to requesting physician.

## 2021-10-19 NOTE — PROGRESS NOTES
[facility]  [address] [place] [id number]  Phone: [phone]  Fax: [phone]  Primary Provider: Ye Lira  Pre-op Performing Provider:    MELINDA POLLOCK    PREOPERATIVE EVALUATION:  Today's date: 10/19/2021    Joanne Colon is a 36 year old female who presents for a preoperative evaluation.    Surgical Information:  1  Surgery/Procedure: Fistulectomy   Surgery with [facility] (10/20/21) at [facility]    Surgeon: Dr Zelaya.  Surgery Date: 10/20/2021  Time:1.30 pm  Where patient plans to recover: At home  Fax Number for Surgical Facility:[facility]: Fax                          Note does not need to be faxed, will be available electronically in Epic.     2  Surgery/Procedure: Right lumpectomy after Wire Localization,[facility] Lymph Node Biopsy  Surgery Location: [facility] [place] -11/15/2021  Surgeon: Dr. Delaney Perdomo MD (11/15/2021)  Surgery Date: 11/15/2021  Time of Surgery:10:30am (11/15)  Where patient plans to recover: At home with family  Fax number for surgical facility: [facility].  Note does not need to be faxed, will be available electronically in Epic.    Type of Anesthesia Anticipated: General    Assessment & Plan     The proposed surgical procedure is considered LOW risk.    Preop general physical exam  Anal fistula    Malignant neoplasm of nipple of right breast in female, estrogen receptor positive (H)    There is no contraindications to the planned surgery.       Risks and Recommendations:  The patient has the following additional risks and recommendations for perioperative complications:   - No identified additional risk factors other than previously addressed    Medication Instructions:  She is not on any medications.    RECOMMENDATION:  APPROVAL GIVEN to proceed with proposed procedure, without further diagnostic evaluation.      Subjective     HPI related to upcoming procedure:   She has a history of  malignant neoplasia of the nipple of the right breast which is estrogen receptor positive.  She has been on treatment for that with chemotherapy.  She also was noted to have perianal abscess with a fistula for which she is going to have a fistulectomy done.  She is also going to be having a lumpectomy on the right breast with sentinel lymph node biopsy which will be done in 3 weeks.  Today she does not have any concerns.  She feels well.  And noted no symptoms no chest pain or shortness of breath.  Noted no headaches.  Full review of system was done and was negative.    Preop Questions 10/19/2021   1. Have you ever had a heart attack or stroke? No   2. Have you ever had surgery on your heart or blood vessels, such as a stent placement, a coronary artery bypass, or surgery on an artery in your head, neck, heart, or legs? No   3. Do you have chest pain with activity? No   4. Do you have a history of  heart failure? No   5. Do you currently have a cold, bronchitis or symptoms of other infection? No   6. Do you have a cough, shortness of breath, or wheezing? No   7. Do you or anyone in your family have previous history of blood clots? No   8. Do you or does anyone in your family have a serious bleeding problem such as prolonged bleeding following surgeries or cuts? No   9. Have you ever had problems with anemia or been told to take iron pills? No   10. Have you had any abnormal blood loss such as black, tarry or bloody stools, or abnormal vaginal bleeding? No   11. Have you ever had a blood transfusion? No   12. Are you willing to have a blood transfusion if it is medically needed before, during, or after your surgery? Yes, will prefer Family.   13. Have you or any of your relatives ever had problems with anesthesia? No   14. Do you have sleep apnea, excessive snoring or daytime drowsiness? No   15. Do you have any artifical heart valves or other implanted medical devices like a pacemaker, defibrillator, or continuous  glucose monitor? No   16. Do you have artificial joints? No   17. Are you allergic to latex? No   18. Is there any chance that you may be pregnant? No       Health Care Directive:  Patient does not have a Health Care Directive or Living Will: Discussed advance care planning with patient; information given to patient to review.     Preoperative Review of :   reviewed - controlled substances reflected in medication list.      Status of Chronic Conditions:  See problem list for active medical problems.  Problems all longstanding and stable, except as noted/documented.  See ROS for pertinent symptoms related to these conditions.      Review of Systems  CONSTITUTIONAL: NEGATIVE for fever, chills, change in weight  INTEGUMENTARY/SKIN: NEGATIVE for worrisome rashes, moles or lesions  EYES: NEGATIVE for vision changes or irritation  ENT/MOUTH: NEGATIVE for ear, mouth and throat problems  RESP: NEGATIVE for significant cough or SOB  CV: NEGATIVE for chest pain, palpitations or peripheral edema  GI: NEGATIVE for nausea, abdominal pain, heartburn, or change in bowel habits  : NEGATIVE for frequency, dysuria, or hematuria  MUSCULOSKELETAL: NEGATIVE for significant arthralgias or myalgia  NEURO: NEGATIVE for weakness, dizziness or paresthesias  ENDOCRINE: NEGATIVE for temperature intolerance, skin/hair changes  HEME: NEGATIVE for bleeding problems  PSYCHIATRIC: NEGATIVE for changes in mood or affect    Patient Active Problem List    Diagnosis Date Noted     Functional diarrhea 07/18/2021     Priority: Medium     Chemotherapy induced nausea and vomiting 07/18/2021     Priority: Medium     Neutropenic sepsis (H) 05/23/2021     Priority: Medium     Neutropenic fever (H)      Priority: Medium     Hepatitis      Priority: Medium     Mucositis due to chemotherapy      Priority: Medium     Lesion of liver less than 1 cm in diameter      Priority: Medium     Chemotherapy-induced neutropenia (H)      Priority: Medium     Fever  and chills 2021     Priority: Medium     Malignant neoplasm of nipple of right breast in female, estrogen receptor positive (H) 2021     Priority: Medium     Encounter for antineoplastic chemotherapy 2021     Priority: Medium     Breast mass, right 2021     Priority: Medium     Dx 2021          Past Medical History:   Diagnosis Date     Breast cancer (H)      Breast mass, right 2021    Dx 2021      Chemotherapy induced nausea and vomiting 2021     Chemotherapy-induced neutropenia (H)      Colitis, acute      Encounter for antineoplastic chemotherapy 2021     Fever and chills 2021     Functional diarrhea 2021     Hepatitis      Lesion of liver less than 1 cm in diameter      Malignant neoplasm of nipple of right breast in female, estrogen receptor positive (H) 2021     Mucositis due to chemotherapy      Neutropenic fever (H)      Neutropenic sepsis (H) 2021     Past Surgical History:   Procedure Laterality Date      SECTION      x two     IR CHEST PORT PLACEMENT > 5 YRS OF AGE  2021     IR PORT PLACEMENT >5 YEARS  2021     US BIOPSY FINE NEEDLE ASPIRATION LYMPH NODE (BREAST) RIGHT Right 2021     US BREAST CORE BIOPSY RIGHT Right 2021     Current Outpatient Medications   Medication Sig Dispense Refill     acetaminophen (TYLENOL) 500 MG tablet Take 500 mg by mouth every 6 hours as needed for mild pain  (Patient not taking: Reported on 2021)       calcium carbonate (TUMS) 500 MG chewable tablet Take 1 chew tab by mouth 2 times daily as needed for heartburn  (Patient not taking: Reported on 10/18/2021)       dexamethasone (DECADRON) 4 MG tablet Take 4 mg by mouth 2 times daily (with meals) Taking 8mg every 12hours for 3 days starting the day before chemo (Patient not taking: Reported on 2021)       gentamicin (GARAMYCIN) 0.1 % external ointment Apply topically 3 times daily Apply 0.25gram to buttocks wound 3 times  "per day for 14 days (Patient not taking: Reported on 10/18/2021) 30 g 1     gentamicin (GARAMYCIN) 0.1 % external ointment Apply topically 3 times daily Apply topically 3 times daily Apply 0.25gram to buttocks wound 3 times per day for 14 days - Topical (Patient not taking: Reported on 10/18/2021) 15 g 0     loperamide (IMODIUM) 2 MG capsule Take 2 mg by mouth 4 times daily as needed for diarrhea  (Patient not taking: Reported on 10/18/2021)       magic mouthwash suspension (diphenhydrAMINE, lidocaine, aluminum-magnesium & simethicone) Swish and swallow 10 mLs in mouth every 6 hours as needed for mouth sores  (Patient not taking: Reported on 10/18/2021)       ondansetron (ZOFRAN) 4 MG tablet Take 1 tablet (4 mg) by mouth every 8 hours as needed for nausea (Patient not taking: Reported on 9/27/2021) 20 tablet 1     prochlorperazine (COMPAZINE) 10 MG tablet Take 10 mg by mouth  (Patient not taking: Reported on 9/27/2021)       study - lidocaine/prilocaine, IDS# [id number], 2.5-2.5 % external cream Apply topically daily as needed for moderate pain Apply topically as directed prior to blood draw. (Patient not taking: Reported on 10/19/2021) 5 g 1       No Known Allergies     Social History     Tobacco Use     Smoking status: Never Smoker     Smokeless tobacco: Never Used   Substance Use Topics     Alcohol use: Not Currently     Family History   Problem Relation Age of Onset     Diabetes Type 2  Mother      Diabetes Type 2  Father      Breast Cancer No family hx of      History   Drug Use Unknown         Objective     BP 96/60 (BP Location: Right arm, Patient Position: Sitting, Cuff Size: Adult Regular)   Pulse 80   Ht 1.51 m (4' 11.45\")   Wt 58.6 kg (129 lb 1.6 oz)   Breastfeeding No   BMI 25.68 kg/m      Physical Exam    GENERAL APPEARANCE: healthy, alert and no distress     EYES: EOMI, PERRL     HENT: ear canals and TM's normal and nose and mouth without ulcers or lesions     NECK: no adenopathy, no asymmetry, masses, " or scars and thyroid normal to palpation     RESP: lungs clear to auscultation - no rales, rhonchi or wheezes     CV: regular rates and rhythm, normal S1 S2, no S3 or S4 and no murmur, click or rub     ABDOMEN:  soft, nontender, no HSM or masses and bowel sounds normal     MS: extremities normal- no gross deformities noted, no evidence of inflammation in joints, FROM in all extremities.     SKIN: no suspicious lesions or rashes     NEURO: Normal strength and tone, sensory exam grossly normal, mentation intact and speech normal     PSYCH: mentation appears normal. and affect normal/bright     LYMPHATICS: No cervical adenopathy    Recent Labs   Lab Test 10/18/21  0925 09/27/21  0949 05/23/21  0510 05/23/21  0051   HGB 12.0 11.4*   < > 11.3*    211   < > 187   INR  --   --   --  1.21*    139   < > 135*   POTASSIUM 3.8 3.9   < > 3.1*   CR 0.68 0.64   < > 0.64    < > = values in this interval not displayed.        Diagnostics:  No labs were ordered during this visit.  But she does have lab that was done yesterday with normal hemogram.  No EKG required, no history of coronary heart disease, significant arrhythmia, peripheral arterial disease or other structural heart disease.    Revised Cardiac Risk Index (RCRI):  The patient has the following serious cardiovascular risks for perioperative complications:   - No serious cardiac risks = 0 points     RCRI Interpretation: 0 points: Class I (very low risk - 0.4% complication rate)           Signed Electronically by: Que Berry MD  Copy of this evaluation report is provided to requesting physician.

## 2021-10-20 ENCOUNTER — ANESTHESIA EVENT (OUTPATIENT)
Dept: SURGERY | Facility: CLINIC | Age: 36
End: 2021-10-20
Payer: MEDICAID

## 2021-10-20 ENCOUNTER — HOSPITAL ENCOUNTER (OUTPATIENT)
Facility: CLINIC | Age: 36
Discharge: HOME | End: 2021-10-20
Attending: COLON & RECTAL SURGERY | Admitting: COLON & RECTAL SURGERY
Payer: MEDICAID

## 2021-10-20 ENCOUNTER — ANESTHESIA (OUTPATIENT)
Dept: SURGERY | Facility: CLINIC | Age: 36
End: 2021-10-20
Payer: MEDICAID

## 2021-10-20 VITALS
RESPIRATION RATE: 8 BRPM | DIASTOLIC BLOOD PRESSURE: 59 MMHG | SYSTOLIC BLOOD PRESSURE: 103 MMHG | HEART RATE: 106 BPM | OXYGEN SATURATION: 96 % | BODY MASS INDEX: 24.94 KG/M2 | TEMPERATURE: 98 F | HEIGHT: 60 IN | WEIGHT: 127 LBS

## 2021-10-20 DIAGNOSIS — K60.30 ANAL FISTULA: Primary | ICD-10-CM

## 2021-10-20 LAB — HCG UR QL: NEGATIVE

## 2021-10-20 PROCEDURE — 999N000141 HC STATISTIC PRE-PROCEDURE NURSING ASSESSMENT: Performed by: COLON & RECTAL SURGERY

## 2021-10-20 PROCEDURE — 250N000011 HC RX IP 250 OP 636: Performed by: NURSE ANESTHETIST, CERTIFIED REGISTERED

## 2021-10-20 PROCEDURE — 710N000009 HC RECOVERY PHASE 1, LEVEL 1, PER MIN: Performed by: COLON & RECTAL SURGERY

## 2021-10-20 PROCEDURE — 360N000075 HC SURGERY LEVEL 2, PER MIN: Performed by: COLON & RECTAL SURGERY

## 2021-10-20 PROCEDURE — 81025 URINE PREGNANCY TEST: CPT | Performed by: ANESTHESIOLOGY

## 2021-10-20 PROCEDURE — 370N000017 HC ANESTHESIA TECHNICAL FEE, PER MIN: Performed by: COLON & RECTAL SURGERY

## 2021-10-20 PROCEDURE — 250N000013 HC RX MED GY IP 250 OP 250 PS 637: Performed by: COLON & RECTAL SURGERY

## 2021-10-20 PROCEDURE — 88304 TISSUE EXAM BY PATHOLOGIST: CPT | Mod: TC | Performed by: COLON & RECTAL SURGERY

## 2021-10-20 PROCEDURE — 250N000011 HC RX IP 250 OP 636: Performed by: ANESTHESIOLOGY

## 2021-10-20 PROCEDURE — 250N000009 HC RX 250: Performed by: NURSE ANESTHETIST, CERTIFIED REGISTERED

## 2021-10-20 PROCEDURE — 710N000012 HC RECOVERY PHASE 2, PER MINUTE: Performed by: COLON & RECTAL SURGERY

## 2021-10-20 PROCEDURE — 250N000025 HC SEVOFLURANE, PER MIN: Performed by: COLON & RECTAL SURGERY

## 2021-10-20 PROCEDURE — 272N000001 HC OR GENERAL SUPPLY STERILE: Performed by: COLON & RECTAL SURGERY

## 2021-10-20 PROCEDURE — 258N000003 HC RX IP 258 OP 636: Performed by: ANESTHESIOLOGY

## 2021-10-20 PROCEDURE — 250N000009 HC RX 250: Performed by: COLON & RECTAL SURGERY

## 2021-10-20 RX ORDER — ONDANSETRON 2 MG/ML
INJECTION INTRAMUSCULAR; INTRAVENOUS PRN
Status: DISCONTINUED | OUTPATIENT
Start: 2021-10-20 | End: 2021-10-20

## 2021-10-20 RX ORDER — ONDANSETRON 2 MG/ML
4 INJECTION INTRAMUSCULAR; INTRAVENOUS ONCE
Status: DISCONTINUED | OUTPATIENT
Start: 2021-10-20 | End: 2021-10-20 | Stop reason: HOSPADM

## 2021-10-20 RX ORDER — HYDROMORPHONE HCL IN WATER/PF 6 MG/30 ML
0.2 PATIENT CONTROLLED ANALGESIA SYRINGE INTRAVENOUS EVERY 5 MIN PRN
Status: DISCONTINUED | OUTPATIENT
Start: 2021-10-20 | End: 2021-10-20 | Stop reason: HOSPADM

## 2021-10-20 RX ORDER — ACETAMINOPHEN 325 MG/1
975 TABLET ORAL ONCE
Status: COMPLETED | OUTPATIENT
Start: 2021-10-20 | End: 2021-10-20

## 2021-10-20 RX ORDER — LIDOCAINE HYDROCHLORIDE 20 MG/ML
INJECTION, SOLUTION INFILTRATION; PERINEURAL PRN
Status: DISCONTINUED | OUTPATIENT
Start: 2021-10-20 | End: 2021-10-20

## 2021-10-20 RX ORDER — SODIUM CHLORIDE, SODIUM LACTATE, POTASSIUM CHLORIDE, CALCIUM CHLORIDE 600; 310; 30; 20 MG/100ML; MG/100ML; MG/100ML; MG/100ML
INJECTION, SOLUTION INTRAVENOUS CONTINUOUS
Status: DISCONTINUED | OUTPATIENT
Start: 2021-10-20 | End: 2021-10-20 | Stop reason: HOSPADM

## 2021-10-20 RX ORDER — PROPOFOL 10 MG/ML
INJECTION, EMULSION INTRAVENOUS PRN
Status: DISCONTINUED | OUTPATIENT
Start: 2021-10-20 | End: 2021-10-20

## 2021-10-20 RX ORDER — FENTANYL CITRATE 0.05 MG/ML
25 INJECTION, SOLUTION INTRAMUSCULAR; INTRAVENOUS EVERY 5 MIN PRN
Status: DISCONTINUED | OUTPATIENT
Start: 2021-10-20 | End: 2021-10-20 | Stop reason: HOSPADM

## 2021-10-20 RX ORDER — OXYCODONE HYDROCHLORIDE 5 MG/1
5 TABLET ORAL EVERY 6 HOURS PRN
Qty: 10 TABLET | Refills: 0 | Status: SHIPPED | OUTPATIENT
Start: 2021-10-20 | End: 2021-11-11

## 2021-10-20 RX ORDER — LIDOCAINE 40 MG/G
CREAM TOPICAL
Status: DISCONTINUED | OUTPATIENT
Start: 2021-10-20 | End: 2021-10-20 | Stop reason: HOSPADM

## 2021-10-20 RX ORDER — ONDANSETRON 4 MG/1
4 TABLET, ORALLY DISINTEGRATING ORAL EVERY 30 MIN PRN
Status: DISCONTINUED | OUTPATIENT
Start: 2021-10-20 | End: 2021-10-20 | Stop reason: HOSPADM

## 2021-10-20 RX ORDER — DEXAMETHASONE SODIUM PHOSPHATE 4 MG/ML
INJECTION, SOLUTION INTRA-ARTICULAR; INTRALESIONAL; INTRAMUSCULAR; INTRAVENOUS; SOFT TISSUE PRN
Status: DISCONTINUED | OUTPATIENT
Start: 2021-10-20 | End: 2021-10-20

## 2021-10-20 RX ORDER — FENTANYL CITRATE 50 UG/ML
INJECTION, SOLUTION INTRAMUSCULAR; INTRAVENOUS PRN
Status: DISCONTINUED | OUTPATIENT
Start: 2021-10-20 | End: 2021-10-20

## 2021-10-20 RX ORDER — MAGNESIUM HYDROXIDE 1200 MG/15ML
LIQUID ORAL PRN
Status: DISCONTINUED | OUTPATIENT
Start: 2021-10-20 | End: 2021-10-20 | Stop reason: HOSPADM

## 2021-10-20 RX ORDER — ONDANSETRON 2 MG/ML
4 INJECTION INTRAMUSCULAR; INTRAVENOUS EVERY 30 MIN PRN
Status: DISCONTINUED | OUTPATIENT
Start: 2021-10-20 | End: 2021-10-20 | Stop reason: HOSPADM

## 2021-10-20 RX ORDER — MEPERIDINE HYDROCHLORIDE 25 MG/ML
12.5 INJECTION INTRAMUSCULAR; INTRAVENOUS; SUBCUTANEOUS
Status: DISCONTINUED | OUTPATIENT
Start: 2021-10-20 | End: 2021-10-20 | Stop reason: HOSPADM

## 2021-10-20 RX ORDER — OXYCODONE HYDROCHLORIDE 5 MG/1
5 TABLET ORAL EVERY 4 HOURS PRN
Status: DISCONTINUED | OUTPATIENT
Start: 2021-10-20 | End: 2021-10-20 | Stop reason: HOSPADM

## 2021-10-20 RX ORDER — OXYCODONE HYDROCHLORIDE 5 MG/1
5 TABLET ORAL
Status: DISCONTINUED | OUTPATIENT
Start: 2021-10-20 | End: 2021-10-20 | Stop reason: HOSPADM

## 2021-10-20 RX ORDER — FENTANYL CITRATE 0.05 MG/ML
25 INJECTION, SOLUTION INTRAMUSCULAR; INTRAVENOUS
Status: DISCONTINUED | OUTPATIENT
Start: 2021-10-20 | End: 2021-10-20 | Stop reason: HOSPADM

## 2021-10-20 RX ORDER — BUPIVACAINE HYDROCHLORIDE AND EPINEPHRINE 2.5; 5 MG/ML; UG/ML
INJECTION, SOLUTION INFILTRATION; PERINEURAL PRN
Status: DISCONTINUED | OUTPATIENT
Start: 2021-10-20 | End: 2021-10-20 | Stop reason: HOSPADM

## 2021-10-20 RX ORDER — LIDOCAINE HYDROCHLORIDE 20 MG/ML
JELLY TOPICAL 4 TIMES DAILY PRN
Qty: 30 ML | Refills: 1 | Status: SHIPPED | OUTPATIENT
Start: 2021-10-20 | End: 2021-11-11

## 2021-10-20 RX ADMIN — MIDAZOLAM 2 MG: 1 INJECTION INTRAMUSCULAR; INTRAVENOUS at 18:18

## 2021-10-20 RX ADMIN — ONDANSETRON 4 MG: 2 INJECTION INTRAMUSCULAR; INTRAVENOUS at 18:46

## 2021-10-20 RX ADMIN — SUGAMMADEX 200 MG: 100 INJECTION, SOLUTION INTRAVENOUS at 18:47

## 2021-10-20 RX ADMIN — FENTANYL CITRATE 50 MCG: 50 INJECTION, SOLUTION INTRAMUSCULAR; INTRAVENOUS at 18:19

## 2021-10-20 RX ADMIN — FENTANYL CITRATE 25 MCG: 50 INJECTION, SOLUTION INTRAMUSCULAR; INTRAVENOUS at 19:13

## 2021-10-20 RX ADMIN — ROCURONIUM BROMIDE 50 MG: 50 INJECTION, SOLUTION INTRAVENOUS at 18:19

## 2021-10-20 RX ADMIN — DEXAMETHASONE SODIUM PHOSPHATE 4 MG: 4 INJECTION, SOLUTION INTRA-ARTICULAR; INTRALESIONAL; INTRAMUSCULAR; INTRAVENOUS; SOFT TISSUE at 18:46

## 2021-10-20 RX ADMIN — ACETAMINOPHEN 975 MG: 325 TABLET, FILM COATED ORAL at 14:05

## 2021-10-20 RX ADMIN — FENTANYL CITRATE 25 MCG: 50 INJECTION, SOLUTION INTRAMUSCULAR; INTRAVENOUS at 19:18

## 2021-10-20 RX ADMIN — LIDOCAINE HYDROCHLORIDE 60 MG: 20 INJECTION, SOLUTION INFILTRATION; PERINEURAL at 18:19

## 2021-10-20 RX ADMIN — SODIUM CHLORIDE, POTASSIUM CHLORIDE, SODIUM LACTATE AND CALCIUM CHLORIDE: 600; 310; 30; 20 INJECTION, SOLUTION INTRAVENOUS at 18:16

## 2021-10-20 RX ADMIN — PROPOFOL 200 MG: 10 INJECTION, EMULSION INTRAVENOUS at 18:19

## 2021-10-20 ASSESSMENT — ENCOUNTER SYMPTOMS: SEIZURES: 0

## 2021-10-20 ASSESSMENT — MIFFLIN-ST. JEOR: SCORE: 1177.57

## 2021-10-20 ASSESSMENT — COPD QUESTIONNAIRES: COPD: 0

## 2021-10-20 NOTE — ANESTHESIA PREPROCEDURE EVALUATION
Anesthesia Pre-Procedure Evaluation    Patient: Joanne Colon   MRN: [id number] : 1985        Preoperative Diagnosis: Anal fistula [K60.3]    Procedure : Procedure(s):  EXAM UNDER ANESTHESIA,  POSSIBLE FISTULOTOMY, POSSIBLE SETON          Past Medical History:   Diagnosis Date     Breast cancer (H)      Breast mass, right 2021    Dx 2021      Chemotherapy induced nausea and vomiting 2021     Chemotherapy-induced neutropenia (H)      Colitis, acute      Encounter for antineoplastic chemotherapy 2021     Fever and chills 2021     Functional diarrhea 2021     Hepatitis      Lesion of liver less than 1 cm in diameter      Malignant neoplasm of nipple of right breast in female, estrogen receptor positive (H) 2021     Mucositis due to chemotherapy      Neutropenic fever (H)      Neutropenic sepsis (H) 2021      Past Surgical History:   Procedure Laterality Date      SECTION      x two     IR CHEST PORT PLACEMENT > 5 YRS OF AGE  2021     IR PORT PLACEMENT >5 YEARS  2021     US BIOPSY FINE NEEDLE ASPIRATION LYMPH NODE (BREAST) RIGHT Right 2021     US BREAST CORE BIOPSY RIGHT Right 2021      No Known Allergies   Social History     Tobacco Use     Smoking status: Never Smoker     Smokeless tobacco: Never Used   Substance Use Topics     Alcohol use: Not Currently      Wt Readings from Last 1 Encounters:   10/20/21 57.6 kg (127 lb)        Anesthesia Evaluation   Pt has had prior anesthetic.     History of anesthetic complications  - PONV.      ROS/MED HX  ENT/Pulmonary:    (-) asthma, COPD and sleep apnea   Neurologic:    (-) no seizures and no CVA   Cardiovascular:    (-) dyslipidemia   METS/Exercise Tolerance:     Hematologic:       Musculoskeletal:       GI/Hepatic:     (+) hepatitis  (-) GERD and liver disease   Renal/Genitourinary:       Endo:       Psychiatric/Substance Use:       Infectious Disease:       Malignancy:   (+) Malignancy, History of  Breast.    Other:            Physical Exam    Airway        Mallampati: II   TM distance: > 3 FB   Neck ROM: full     Respiratory Devices and Support         Dental  no notable dental history         Cardiovascular   cardiovascular exam normal          Pulmonary                   OUTSIDE LABS:  CBC:   Lab Results   Component Value Date    WBC 4.5 10/18/2021    WBC 6.6 09/27/2021    HGB 12.0 10/18/2021    HGB 11.4 (L) 09/27/2021    HCT 36.7 10/18/2021    HCT 35.1 09/27/2021     10/18/2021     09/27/2021     BMP:   Lab Results   Component Value Date     10/18/2021     09/27/2021    POTASSIUM 3.8 10/18/2021    POTASSIUM 3.9 09/27/2021    CHLORIDE 105 10/18/2021    CHLORIDE 105 09/27/2021    CO2 23 10/18/2021    CO2 22 09/27/2021    BUN 9 10/18/2021    BUN 12 09/27/2021    CR 0.68 10/18/2021    CR 0.64 09/27/2021    GLC 92 10/18/2021     09/27/2021     COAGS:   Lab Results   Component Value Date    PTT 35 05/23/2021    INR 1.21 (H) 05/23/2021     POC:   Lab Results   Component Value Date    HCG Negative 10/20/2021    HCGS Negative 05/23/2021     HEPATIC:   Lab Results   Component Value Date    ALBUMIN 4.0 10/18/2021    PROTTOTAL 7.0 10/18/2021    ALT 30 10/18/2021    AST 27 10/18/2021    ALKPHOS 76 10/18/2021    BILITOTAL 0.5 10/18/2021     OTHER:   Lab Results   Component Value Date    LACT 0.7 05/23/2021    TANNER 9.6 10/18/2021    MAG 1.8 06/24/2021    CRP <0.1 04/01/2021       Anesthesia Plan    ASA Status:  2      Anesthesia Type: General.     - Airway: ETT              Consents    Anesthesia Plan(s) and associated risks, benefits, and realistic alternatives discussed. Questions answered and patient/representative(s) expressed understanding.     - Discussed with:  Patient         Postoperative Care       PONV prophylaxis: Ondansetron (or other 5HT-3), Dexamethasone or Solumedrol, Background Propofol Infusion     Comments:                Myah Maldonado

## 2021-10-20 NOTE — OR NURSING
Pt and family updated that Dr. Zelaya would be out to talk with them soon and discuss delay.  Call light with-in reach, no needs at this time.

## 2021-10-20 NOTE — ANESTHESIA PROCEDURE NOTES
Airway       Patient location: [facility] - Operating Room or Procedural Area.       Procedure Start/Stop Times: 10/20/2021 6:21 PM and 10/20/2021 6:21 PM  Staff -        CRNA: Teo Mcnair APRN CRNA       Performed By: CRNAIndications and Patient Condition       Indications for airway management: major-procedural and airway protection       Induction type:intravenous       Mask difficulty assessment: 1 - vent by mask    Final Airway Details       Final airway type: endotracheal airway       Successful airway: ETT - single  Endotracheal Airway Details        ETT size (mm): 7.0       Cuffed: yes       Successful intubation technique: direct laryngoscopy       DL Blade Type: Vincent 2       Grade View of Cords: 1       Adjucts: stylet       Position: Center       Measured from: gums/teeth       Bite block used: None    Post intubation assessment        Placement verified by: capnometry, equal breath sounds and chest rise        Number of attempts at approach: 1       Secured with: pink tape       Ease of procedure: easy       Dentition: Intact and Unchanged    Additional Comments         Routine major-procedural airway protection.     Vincent 2.    7.0 mm ID endotracheal tube.

## 2021-10-21 NOTE — ANESTHESIA CARE TRANSFER NOTE
Patient: Joanne Colon    Procedure: Procedure(s):  EXAM UNDER ANESTHESIA,    SETON placement        Diagnosis: Anal fistula [K60.3]  Diagnosis Additional Information: No value filed.    Anesthesia Type:   General     Note:    Oropharynx: oropharynx clear of all foreign objects  Level of Consciousness: drowsy  Oxygen Supplementation: face mask  Level of Supplemental Oxygen (L/min / FiO2): 6  Independent Airway: airway patency satisfactory and stable    Vital Signs Stable: post-procedure vital signs reviewed and stable  Report to RN Given: handoff report given  Patient transferred to: PACU  Comments: Patient appears comfortable  Handoff Report: Identifed the Patient, Identified the Reponsible Provider, Reviewed the pertinent medical history, Discussed the surgical course, Reviewed Intra-OP anesthesia mangement and issues during anesthesia, Set expectations for post-procedure period and Allowed opportunity for questions and acknowledgement of understanding      Vitals:  Vitals Value Taken Time   /65 10/20/21 1900   Temp     Pulse 106 10/20/21 1901   Resp 25 10/20/21 1901   SpO2 100 % 10/20/21 1901   Vitals shown include unvalidated device data.    Electronically Signed By: NOHEMI Wong CRNA  October 20, 2021  7:02 PM

## 2021-10-21 NOTE — DISCHARGE INSTRUCTIONS
Same Day Surgery Discharge Instructions for  Sedation and General Anesthesia       It's not unusual to feel dizzy, light-headed or faint for up to 24 hours after surgery or while taking pain medication.  If you have these symptoms: sit for a few minutes before standing and have someone assist you when you get up to walk or use the bathroom.      You should rest and relax for the next 24 hours. We recommend you make arrangements to have an adult stay with you for at least 24 hours after your discharge.  Avoid hazardous and strenuous activity.      DO NOT DRIVE any vehicle or operate mechanical equipment for 24 hours following the end of your surgery.  Even though you may feel normal, your reactions may be affected by the medication you have received.      Do not drink alcoholic beverages for 24 hours following surgery.       Slowly progress to your regular diet as you feel able. It's not unusual to feel nauseated and/or vomit after receiving anesthesia.  If you develop these symptoms, drink clear liquids (apple juice, ginger ale, broth, 7-up, etc. ) until you feel better.  If your nausea and vomiting persists for 24 hours, please notify your surgeon.        All narcotic pain medications, along with inactivity and anesthesia, can cause constipation. Drinking plenty of liquids and increasing fiber intake will help.      For any questions of a medical nature, call your surgeon.      Do not make important decisions for 24 hours.      If you had general anesthesia, you may have a sore throat for a couple of days related to the breathing tube used during surgery.  You may use Cepacol lozenges to help with this discomfort.  If it worsens or if you develop a fever, contact your surgeon.       If you feel your pain is not well managed with the pain medications prescribed by your surgeon, please contact your surgeon's office to let them know so they can address your concerns.       CoVid 19 Information    We want to give you  information regarding Covid. Please consult your primary care provider with any questions you might have.     Patient who have symptoms (cough, fever, or shortness of breath), need to isolate for 7 days from when symptoms started OR 72 hours after fever resolves (without fever reducing medications) AND improvement of respiratory symptoms (whichever is longer).      Isolate yourself at home (in own room/own bathroom if possible)    Do Not allow any visitors    Do Not go to work or school    Do Not go to [employer],  centers, shopping, or other public places.    Do Not shake hands.    Avoid close and intimate contact with others (hugging, kissing).    Follow CDC recommendations for household cleaning of frequently touched services.     After the initial 7 days, continue to isolate yourself from household members as much as possible. To continue decrease the risk of community spread and exposure, you and any members of your household should limit activities in public for 14 days after starting home isolation.     You can reference the following CDC link for helpful home isolation/care tips:  https://www.cdc.gov/coronavirus/2019-ncov/downloads/10Things.pdf    Protect Others:    Cover Your Mouth and Nose with a mask, disposable tissue or wash cloth to avoid spreading germs to others.    Wash your hands and face frequently with soap and water    Call Your Primary Doctor If: Breathing difficulty develops or you become worse.    For more information about COVID19 and options for caring for yourself at home, please visit the CDC website at https://www.cdc.gov/coronavirus/2019-ncov/about/steps-when-sick.html  For more options for care at [facility], please visit our website at [email or web address]    **If you have questions or concerns about your procedure,  call Dr. Zelaya at [phone]**      Call the office if you have:    Fever greater than 101     Chills     Foul-smelling  drainage     Nausea and vomiting     Diarrhea - greater than 3 water stools in 24 hours     Constipation - no bowel movement for 3 days     Severe bleeding that does not stop soon after a bowel movement     Problems with the incision, including increased pain, swelling, redness, or drainage.    Difficulty urinating    Today you were given 975 mg of Tylenol at 2:05pm 10/20. The recommended daily maximum dose is 4000 mg.

## 2021-10-21 NOTE — OR NURSING
Pt able to void, declined pain medication. Discharged in stable condition with sister. Instructions and demonstration of drain performed with sister. Pt and sister verbalized understanding of discharge instructions.

## 2021-10-22 LAB
PATH REPORT.COMMENTS IMP SPEC: NORMAL
PATH REPORT.COMMENTS IMP SPEC: NORMAL
PATH REPORT.FINAL DX SPEC: NORMAL
PATH REPORT.GROSS SPEC: NORMAL
PATH REPORT.MICROSCOPIC SPEC OTHER STN: NORMAL
PATH REPORT.RELEVANT HX SPEC: NORMAL
PHOTO IMAGE: NORMAL

## 2021-10-22 PROCEDURE — 88304 TISSUE EXAM BY PATHOLOGIST: CPT | Mod: 26 | Performed by: PATHOLOGY

## 2021-10-22 NOTE — OP NOTE
DATE OF SURGERY: October 22, 2021  PREOPERATIVE DIAGNOSIS: Fistula-in-ano.     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     PROCEDURE:   1. Exam under anesthesia.   2. Debridement of external fistula opening.  3. Placement of Seton in Fistula-in-ano     SURGEON: Kierra Zelaya MD     ANESTHESIA: General.     INDICATION: Joanne Colon is a 36 year old female with a history of breast cancer status post neoadjuvant treatment.  She developed significant perianal pain with drainage.  She was seen in the clinic and noted to have an external opening setting for a fistula.  She did not tolerate any further exam in the office.  We discussed going to the operating room for an exam under anesthesia, possible fistulotomy possible seton placement depending on the operative findings.  We discussed the risk the procedure including bleeding, infection, small risk of incontinence, and the need for additional procedures in the future.  She understands these risks and agrees to proceed.  She will now undergo exam under anesthesia.     OPERATIVE FINDINGS: The patient had an external fistula opening in the posterior midline position approximately 20 mm from the anal verge. The internal opening was located in the posterior midline. The fistula tract involved skin, subcutaneous tissue and anal sphincter muscle.  There did appear to be some horseshoe type extensions extending onto the right and the left.  Approximately 30% of the sphincter complex is involved.  The external opening was debrided to facilitate drainage and a non-cutting seton was placed.  There was no evidence of undrained abscess or perianal sepsis.  Anoscopy revealed normal rectal mucosa.      PROCEDURE IN DETAIL: After informed consent was obtained, the patient was brought to the operating room, where general anesthesia was induced without difficulty. She was flipped into a well-padded prone jackknife position, with the buttocks taped apart. The  perianal region was sterilely prepped and draped in the usual fashion. A Whitney bivalve retractor was inserted into the anal canal and the operative findings are noted above.   A gently curve fistula probe was inserted into the external fistula opening in the posterior midline position and tracked easily into the internal opening in the posterior midline. Palpation of the tissue on top of the probe revealed a  fairly deep fistula tract that involved skin, subcutaneous tissue and  sphincter muscle. The external opening of the fistula was already quite large and so did not need to be further enlarged.   The tract was curetted out and the tissue was sent to pathology as fistula tract curettings.  I did probe is with the fistula probe and noted in extension out to the right and left concerning for horseshoe type fistula.  Given that the posterior midline external opening was already 40 quite large I decided not to make any counterincisions.  A 0-Silk suture was tied to the gently curved fistula probe and pulled through the fistula tract.  This 0-silk suture was then secured to a red vessel loop which was pulled through the tract.  The red vessel loop was then secured to itself using 0-silk as a seton.  The seton was loose and easily spun through the tract.    A solution of 0.5% Marcaine totaling 30 mL was instilled at the base of the fistula and perianal region for prolonged postoperative analgesia.  Sterile gauze dressings were applied.   The patient tolerated the procedure well, without complications. Estimated blood loss was 2 mL. I was present and scrubbed for the entire duration of the operation. The patient was returned to the supine position, extubated in the Operating Room and brought to the Recovery Room in stable condition.     Kierra Zelaya MD

## 2021-11-09 ENCOUNTER — INFUSION THERAPY VISIT (OUTPATIENT)
Dept: INFUSION THERAPY | Facility: CLINIC | Age: 36
End: 2021-11-09
Attending: NURSE PRACTITIONER
Payer: MEDICAID

## 2021-11-09 ENCOUNTER — ONCOLOGY VISIT (OUTPATIENT)
Dept: ONCOLOGY | Facility: CLINIC | Age: 36
End: 2021-11-09
Attending: NURSE PRACTITIONER
Payer: MEDICAID

## 2021-11-09 VITALS
TEMPERATURE: 98.3 F | RESPIRATION RATE: 16 BRPM | HEART RATE: 71 BPM | OXYGEN SATURATION: 99 % | DIASTOLIC BLOOD PRESSURE: 73 MMHG | BODY MASS INDEX: 25.37 KG/M2 | WEIGHT: 129.9 LBS | SYSTOLIC BLOOD PRESSURE: 118 MMHG

## 2021-11-09 DIAGNOSIS — Z17.0 MALIGNANT NEOPLASM OF UPPER-INNER QUADRANT OF RIGHT BREAST IN FEMALE, ESTROGEN RECEPTOR POSITIVE (H): Primary | ICD-10-CM

## 2021-11-09 DIAGNOSIS — Z51.11 ENCOUNTER FOR ANTINEOPLASTIC CHEMOTHERAPY: ICD-10-CM

## 2021-11-09 DIAGNOSIS — C50.011 MALIGNANT NEOPLASM OF NIPPLE OF RIGHT BREAST IN FEMALE, ESTROGEN RECEPTOR POSITIVE (H): Primary | ICD-10-CM

## 2021-11-09 DIAGNOSIS — C50.211 MALIGNANT NEOPLASM OF UPPER-INNER QUADRANT OF RIGHT BREAST IN FEMALE, ESTROGEN RECEPTOR POSITIVE (H): Primary | ICD-10-CM

## 2021-11-09 DIAGNOSIS — Z17.0 MALIGNANT NEOPLASM OF NIPPLE OF RIGHT BREAST IN FEMALE, ESTROGEN RECEPTOR POSITIVE (H): Primary | ICD-10-CM

## 2021-11-09 LAB
HOLD SPECIMEN: NORMAL

## 2021-11-09 PROCEDURE — 258N000003 HC RX IP 258 OP 636: Performed by: INTERNAL MEDICINE

## 2021-11-09 PROCEDURE — 96417 CHEMO IV INFUS EACH ADDL SEQ: CPT

## 2021-11-09 PROCEDURE — 96413 CHEMO IV INFUSION 1 HR: CPT

## 2021-11-09 PROCEDURE — 250N000011 HC RX IP 250 OP 636: Performed by: INTERNAL MEDICINE

## 2021-11-09 PROCEDURE — G0463 HOSPITAL OUTPT CLINIC VISIT: HCPCS | Mod: 25

## 2021-11-09 PROCEDURE — 99214 OFFICE O/P EST MOD 30 MIN: CPT | Performed by: INTERNAL MEDICINE

## 2021-11-09 RX ORDER — HEPARIN SODIUM (PORCINE) LOCK FLUSH IV SOLN 100 UNIT/ML 100 UNIT/ML
5 SOLUTION INTRAVENOUS
Status: CANCELLED | OUTPATIENT
Start: 2021-11-09

## 2021-11-09 RX ORDER — ALBUTEROL SULFATE 0.83 MG/ML
2.5 SOLUTION RESPIRATORY (INHALATION)
Status: CANCELLED | OUTPATIENT
Start: 2021-11-09

## 2021-11-09 RX ORDER — DIPHENHYDRAMINE HYDROCHLORIDE 50 MG/ML
50 INJECTION INTRAMUSCULAR; INTRAVENOUS
Status: CANCELLED
Start: 2021-11-09

## 2021-11-09 RX ORDER — MEPERIDINE HYDROCHLORIDE 50 MG/ML
25 INJECTION INTRAMUSCULAR; INTRAVENOUS; SUBCUTANEOUS EVERY 30 MIN PRN
Status: CANCELLED | OUTPATIENT
Start: 2021-11-09

## 2021-11-09 RX ORDER — MEPERIDINE HYDROCHLORIDE 50 MG/ML
25 INJECTION INTRAMUSCULAR; INTRAVENOUS; SUBCUTANEOUS EVERY 30 MIN PRN
Status: DISCONTINUED | OUTPATIENT
Start: 2021-11-09 | End: 2021-11-09 | Stop reason: HOSPADM

## 2021-11-09 RX ORDER — NALOXONE HYDROCHLORIDE 0.4 MG/ML
0.2 INJECTION, SOLUTION INTRAMUSCULAR; INTRAVENOUS; SUBCUTANEOUS
Status: CANCELLED | OUTPATIENT
Start: 2021-11-09

## 2021-11-09 RX ORDER — DIPHENHYDRAMINE HYDROCHLORIDE 50 MG/ML
50 INJECTION INTRAMUSCULAR; INTRAVENOUS
Status: DISCONTINUED | OUTPATIENT
Start: 2021-11-09 | End: 2021-11-09 | Stop reason: HOSPADM

## 2021-11-09 RX ORDER — ALBUTEROL SULFATE 90 UG/1
1-2 AEROSOL, METERED RESPIRATORY (INHALATION)
Status: DISCONTINUED | OUTPATIENT
Start: 2021-11-09 | End: 2021-11-09 | Stop reason: HOSPADM

## 2021-11-09 RX ORDER — METHYLPREDNISOLONE SODIUM SUCCINATE 125 MG/2ML
125 INJECTION, POWDER, LYOPHILIZED, FOR SOLUTION INTRAMUSCULAR; INTRAVENOUS
Status: DISCONTINUED | OUTPATIENT
Start: 2021-11-09 | End: 2021-11-09 | Stop reason: HOSPADM

## 2021-11-09 RX ORDER — NALOXONE HYDROCHLORIDE 0.4 MG/ML
0.2 INJECTION, SOLUTION INTRAMUSCULAR; INTRAVENOUS; SUBCUTANEOUS
Status: DISCONTINUED | OUTPATIENT
Start: 2021-11-09 | End: 2021-11-09 | Stop reason: HOSPADM

## 2021-11-09 RX ORDER — DIPHENHYDRAMINE HCL 50 MG
50 CAPSULE ORAL
Status: CANCELLED | OUTPATIENT
Start: 2021-11-09

## 2021-11-09 RX ORDER — LORAZEPAM 2 MG/ML
0.5 INJECTION INTRAMUSCULAR EVERY 4 HOURS PRN
Status: DISCONTINUED | OUTPATIENT
Start: 2021-11-09 | End: 2021-11-09 | Stop reason: HOSPADM

## 2021-11-09 RX ORDER — ALBUTEROL SULFATE 0.83 MG/ML
2.5 SOLUTION RESPIRATORY (INHALATION)
Status: DISCONTINUED | OUTPATIENT
Start: 2021-11-09 | End: 2021-11-09 | Stop reason: HOSPADM

## 2021-11-09 RX ORDER — DIPHENHYDRAMINE HCL 50 MG
50 CAPSULE ORAL
Status: DISCONTINUED | OUTPATIENT
Start: 2021-11-09 | End: 2021-11-09 | Stop reason: HOSPADM

## 2021-11-09 RX ORDER — METHYLPREDNISOLONE SODIUM SUCCINATE 125 MG/2ML
125 INJECTION, POWDER, LYOPHILIZED, FOR SOLUTION INTRAMUSCULAR; INTRAVENOUS
Status: CANCELLED
Start: 2021-11-09

## 2021-11-09 RX ORDER — EPINEPHRINE 1 MG/ML
0.3 INJECTION, SOLUTION INTRAMUSCULAR; SUBCUTANEOUS EVERY 5 MIN PRN
Status: CANCELLED | OUTPATIENT
Start: 2021-11-09

## 2021-11-09 RX ORDER — HEPARIN SODIUM,PORCINE 10 UNIT/ML
5 VIAL (ML) INTRAVENOUS
Status: CANCELLED | OUTPATIENT
Start: 2021-11-09

## 2021-11-09 RX ORDER — ACETAMINOPHEN 325 MG/1
650 TABLET ORAL
Status: CANCELLED
Start: 2021-11-09

## 2021-11-09 RX ORDER — HEPARIN SODIUM (PORCINE) LOCK FLUSH IV SOLN 100 UNIT/ML 100 UNIT/ML
5 SOLUTION INTRAVENOUS
Status: DISCONTINUED | OUTPATIENT
Start: 2021-11-09 | End: 2021-11-09 | Stop reason: HOSPADM

## 2021-11-09 RX ORDER — EPINEPHRINE 1 MG/ML
0.3 INJECTION, SOLUTION INTRAMUSCULAR; SUBCUTANEOUS EVERY 5 MIN PRN
Status: DISCONTINUED | OUTPATIENT
Start: 2021-11-09 | End: 2021-11-09 | Stop reason: HOSPADM

## 2021-11-09 RX ORDER — ACETAMINOPHEN 325 MG/1
650 TABLET ORAL
Status: DISCONTINUED | OUTPATIENT
Start: 2021-11-09 | End: 2021-11-09 | Stop reason: HOSPADM

## 2021-11-09 RX ORDER — LORAZEPAM 2 MG/ML
0.5 INJECTION INTRAMUSCULAR EVERY 4 HOURS PRN
Status: CANCELLED
Start: 2021-11-09

## 2021-11-09 RX ORDER — ALBUTEROL SULFATE 90 UG/1
1-2 AEROSOL, METERED RESPIRATORY (INHALATION)
Status: CANCELLED
Start: 2021-11-09

## 2021-11-09 RX ADMIN — HEPARIN SODIUM (PORCINE) LOCK FLUSH IV SOLN 100 UNIT/ML 5 ML: 100 SOLUTION at 13:19

## 2021-11-09 RX ADMIN — PERTUZUMAB 420 MG: 30 INJECTION, SOLUTION, CONCENTRATE INTRAVENOUS at 11:54

## 2021-11-09 RX ADMIN — TRASTUZUMAB 348 MG: 150 INJECTION, POWDER, LYOPHILIZED, FOR SOLUTION INTRAVENOUS at 12:38

## 2021-11-09 RX ADMIN — SODIUM CHLORIDE 250 ML: 9 INJECTION, SOLUTION INTRAVENOUS at 11:54

## 2021-11-09 ASSESSMENT — PAIN SCALES - GENERAL: PAINLEVEL: NO PAIN (0)

## 2021-11-09 NOTE — LETTER
11/9/2021         RE: Joanne Colon  [address]  [place] [address]        Dear Colleague,    Thank you for referring your patient, Joanne Colon, to the [facility]. Please see a copy of my visit note below.    Oncology Rooming Note    November 9, 2021 10:57 AM   Joanne Colon is a 36 year old female who presents for:    Chief Complaint   Patient presents with     Oncology Clinic Visit     Initial Vitals: /73   Pulse 71   Temp 98.3  F (36.8  C)   Resp 16   Wt 58.9 kg (129 lb 14.4 oz)   SpO2 99%   BMI 25.37 kg/m   Estimated body mass index is 25.37 kg/m  as calculated from the following:    Height as of 10/20/21: 1.524 m (5').    Weight as of this encounter: 58.9 kg (129 lb 14.4 oz). Body surface area is 1.58 meters squared.  No Pain (0) Comment: Data Unavailable   No LMP recorded. (Menstrual status: Chemotherapy).  Allergies reviewed: Yes  Medications reviewed: Yes    Medications: Medication refills not needed today.  Pharmacy name entered into [employer]: [id number] - [place], [place] - [address]  [facility]    Clinical concerns: None       Lacie Resendez LPN              [facility] Hematology and Oncology Progress Note    Patient: Joanne Colon  MRN: [id number]  Date of Service: Nov 9, 2021         Reason for Visit    Chief Complaint   Patient presents with     Oncology Clinic Visit       Assessment and Plan    Cancer Staging  Malignant neoplasm of nipple of right breast in female, estrogen receptor positive (H)  Staging form: Breast, AJCC 8th Edition  - Clinical stage from 4/21/2021: Stage IB (cT2, cN1(f), cM0, G3, ER+, IN+, HER2+) - Signed by Chelly Sanchez MD on 8/17/2021      ECOG Performance    0 - Independent     Pain  Pain Score: No Pain (0)    #.  cT2 N1 M0 invasive ductal carcinoma, grade 3, triple positive.   Clinically stable.  No new clinical concern.  She will proceed with adjuvant trastuzumab and  pertuzumab while awaiting right breast lumpectomy next week.     Follow-up with me in 3 weeks with labs and infusion appointment as well as to review the pathology result to finalize adjuvant treatment plan.    #, Completed hereditary breast and ovarian cancer gene panels and tested negative for 37 genes analyzed.  (9/2/2021)    Encounter Diagnoses:    Problem List Items Addressed This Visit     Encounter for antineoplastic chemotherapy      Other Visit Diagnoses     Malignant neoplasm of upper-inner quadrant of right breast in female, estrogen receptor positive (H)    -  Primary    Relevant Orders    CBC with Platelets & Differential    Comprehensive metabolic panel             CC: Ye Lira MD   ______________________________________________________________________________  Diagnosis  4/2021-presented to the emergency room with a mass in her right breast along with pain for about 3 months or so.  Denies nipple changes or discharge.  Underwent diagnostic mammogram and ultrasound on 4/13/2021 and it confirmed right breast mass in the 8 o'clock position, 5 cm from the nipple of 2.2 x 2.8 x 1.4 cm with abnormal appearing right axillary lymph node of 1.7 cm with cortical thickness.  She underwent ultrasound-guided core needle biopsy on the same day and it showed invasive ductal carcinoma, grade 3, ER moderate to strong positive, MI strongly positive, HER-2 positive by IHC.              - She met with Dr. Perdomo from surgery and recommended to consider neoadjuvant chemotherapy to HER-2 positive lymph node positive disease.     LMP- 5/10/2021     Treatment to date  5/13/2021-8/26/2021 neoadjuvant TCHP x6.  Required hospitalization with neutropenic fever, neutropenic colitis after cycle #1.  Neulasta added starting cycle #2 chemotherapy.  Treatment course was complicated by posterior midline fistula-in-anoand required debridement of external fistula opening and placement of seton and fistula-in-ano (10/20/21 by   Kierra Zelaya)  Continue trastuzumab and Pertuzumab while awaiting right breast lumpectomy.  11/15/2021-anticipated right breast lumpectomy and right sentinel lymph node biopsy.    History of Present Illness    Ms. Joanne Colon presented today accompanied by her sister.    She is scheduled for right breast lumpectomy and sentinel lymph node biopsy next week.  She does not have diarrhea.  Overall she tolerates well on current therapy with trastuzumab and Pertuzumab.    Review of systems  Apart from describing in HPI, the remainder of comprehensive ROS was negative.    Past History    Past Medical History:   Diagnosis Date     Breast cancer (H)      Breast mass, right 2021    Dx 2021      Chemotherapy induced nausea and vomiting 2021     Chemotherapy-induced neutropenia (H)      Colitis, acute      Encounter for antineoplastic chemotherapy 2021     Fever and chills 2021     Functional diarrhea 2021     Hepatitis      Lesion of liver less than 1 cm in diameter      Malignant neoplasm of nipple of right breast in female, estrogen receptor positive (H) 2021     Mucositis due to chemotherapy      Neutropenic fever (H)      Neutropenic sepsis (H) 2021       Past Surgical History:   Procedure Laterality Date      SECTION      x two     EXAM UNDER ANESTHESIA ANUS N/A 10/20/2021    Procedure: EXAM UNDER ANESTHESIA,;  Surgeon: Jelly Zelaya MD;  Location: [facility] OR     FISTULOTOMY RECTUM N/A 10/20/2021    Procedure:   SETON placement ;  Surgeon: Jelly Zelaya MD;  Location: [facility] OR     IR CHEST PORT PLACEMENT > 5 YRS OF AGE  2021     IR PORT PLACEMENT >5 YEARS  2021     US BIOPSY FINE NEEDLE ASPIRATION LYMPH NODE (BREAST) RIGHT Right 2021     US BREAST CORE BIOPSY RIGHT Right 2021         Physical Exam    /73   Pulse 71   Temp 98.3  F (36.8  C)   Resp 16   Wt 58.9 kg (129 lb 14.4 oz)   SpO2 99%   BMI 25.37 kg/m      General: alert, awake,  not in acute distress  HEENT: Head: Normal, normocephalic, atraumatic.  Eye: Normal external eye, conjunctiva, lids cornea, LISSETH.  Nose: Normal external nose, mucus membranes and septum.  Pharynx: Normal buccal mucosa. Normal pharynx.  Neck / Thyroid: Supple, no masses, nodes, nodules or enlargement.  Lymphatics: No abnormally enlarged lymph nodes.  Chest: Normal chest wall and respirations. Clear to auscultation.  Heart: S1 S2 RRR, no murmur.   Abdomen: abdomen is soft without significant tenderness, masses, organomegaly or guarding  Extremities: normal strength, tone, and muscle mass  Skin: normal. no rash or abnormalities  CNS: non focal.    Lab Results    Recent Results (from the past 168 hour(s))   Extra Red Top Tube   Result Value Ref Range    Hold Specimen JIC    Extra Green Top (Lithium Heparin) Tube   Result Value Ref Range    Hold Specimen JIC    Extra Purple Top Tube   Result Value Ref Range    Hold Specimen JIC    Asymptomatic COVID-19 Virus (Coronavirus) by PCR Nose    Specimen: Nose; Swab   Result Value Ref Range    SARS CoV2 PCR Negative Negative, Testing sent to reference lab. Results will be returned via unsolicited result       Imaging    Image Guided Breast Loc w [place] Node Inj Right    Result Date: 11/15/2021  INDICATION: Pre-operative localization of invasive ductal carcinoma at 9 o'clock, 5 cm from the nipple. PROCEDURE: Informed consent was obtained from the patient. The breast was cleansed with ChloraPrep. Lidocaine was used for local anesthesia. A -gauge needle was then advanced to the area of abnormality. A localization wire was then deployed.  was injected subdermally along the upper outer aspect of the areolar margin. Post-procedure mammograms demonstrate the localization wire in appropriate position. The previously placed marker was visualized. The patient tolerated this well. IMPRESSION: Sonographically guided right wire localization. A specimen was not sent for radiography. [place]  Lymph Node Injection:  530 uci lymphoseek Right upper outer major-areolar region of right breast Bernard Negro     I spent greater than 50% of the 30 minutes on the date of service on counseling and coordination of care.    Signed by: Chelly Sanchez MD      Again, thank you for allowing me to participate in the care of your patient.        Sincerely,        Chelly Sanchez MD

## 2021-11-09 NOTE — PROGRESS NOTES
Infusion Nursing Note:  Joanne Colon presents today for infusions.    Patient seen by provider today: Yes:    present during visit today: Not Applicable.    Note: N/A.      Intravenous Access:  Implanted Port.    Treatment Conditions:  Results reviewed, labs MET treatment parameters, ok to proceed with treatment.      Post Infusion Assessment:  Patient tolerated infusion without incident.       Discharge Plan:   Patient discharged in stable condition accompanied by: mark WAHL RN

## 2021-11-09 NOTE — PROGRESS NOTES
Oncology Rooming Note    November 9, 2021 10:57 AM   Joanne Colon is a 36 year old female who presents for:    Chief Complaint   Patient presents with     Oncology Clinic Visit     Initial Vitals: /73   Pulse 71   Temp 98.3  F (36.8  C)   Resp 16   Wt 58.9 kg (129 lb 14.4 oz)   SpO2 99%   BMI 25.37 kg/m   Estimated body mass index is 25.37 kg/m  as calculated from the following:    Height as of 10/20/21: 1.524 m (5').    Weight as of this encounter: 58.9 kg (129 lb 14.4 oz). Body surface area is 1.58 meters squared.  No Pain (0) Comment: Data Unavailable   No LMP recorded. (Menstrual status: Chemotherapy).  Allergies reviewed: Yes  Medications reviewed: Yes    Medications: Medication refills not needed today.  Pharmacy name entered into [facility]: [facility] #[id number] [place], [place] - [address] AT [facility]    Clinical concerns: None       Lacie Resendez LPN

## 2021-11-11 ENCOUNTER — LAB (OUTPATIENT)
Dept: LAB | Facility: CLINIC | Age: 36
End: 2021-11-11
Attending: SPECIALIST
Payer: MEDICAID

## 2021-11-11 ENCOUNTER — OFFICE VISIT (OUTPATIENT)
Dept: INTERNAL MEDICINE | Facility: CLINIC | Age: 36
End: 2021-11-11
Payer: MEDICAID

## 2021-11-11 VITALS
OXYGEN SATURATION: 98 % | HEIGHT: 60 IN | HEART RATE: 90 BPM | SYSTOLIC BLOOD PRESSURE: 100 MMHG | WEIGHT: 133 LBS | DIASTOLIC BLOOD PRESSURE: 56 MMHG | BODY MASS INDEX: 26.11 KG/M2

## 2021-11-11 DIAGNOSIS — Z11.59 ENCOUNTER FOR SCREENING FOR OTHER VIRAL DISEASES: ICD-10-CM

## 2021-11-11 DIAGNOSIS — Z17.0 MALIGNANT NEOPLASM OF NIPPLE OF RIGHT BREAST IN FEMALE, ESTROGEN RECEPTOR POSITIVE (H): ICD-10-CM

## 2021-11-11 DIAGNOSIS — C50.011 MALIGNANT NEOPLASM OF NIPPLE OF RIGHT BREAST IN FEMALE, ESTROGEN RECEPTOR POSITIVE (H): ICD-10-CM

## 2021-11-11 DIAGNOSIS — Z01.818 PREOP GENERAL PHYSICAL EXAM: Primary | ICD-10-CM

## 2021-11-11 PROBLEM — N63.10 BREAST MASS, RIGHT: Status: RESOLVED | Noted: 2021-04-09 | Resolved: 2021-11-11

## 2021-11-11 PROBLEM — D70.9 NEUTROPENIC SEPSIS (H): Status: RESOLVED | Noted: 2021-05-23 | Resolved: 2021-11-11

## 2021-11-11 PROBLEM — A41.9 NEUTROPENIC SEPSIS (H): Status: RESOLVED | Noted: 2021-05-23 | Resolved: 2021-11-11

## 2021-11-11 PROBLEM — R11.2 CHEMOTHERAPY INDUCED NAUSEA AND VOMITING: Status: RESOLVED | Noted: 2021-07-18 | Resolved: 2021-11-11

## 2021-11-11 PROBLEM — R50.9 FEVER AND CHILLS: Status: RESOLVED | Noted: 2021-05-22 | Resolved: 2021-11-11

## 2021-11-11 PROBLEM — T45.1X5A CHEMOTHERAPY INDUCED NAUSEA AND VOMITING: Status: RESOLVED | Noted: 2021-07-18 | Resolved: 2021-11-11

## 2021-11-11 PROCEDURE — U0003 INFECTIOUS AGENT DETECTION BY NUCLEIC ACID (DNA OR RNA); SEVERE ACUTE RESPIRATORY SYNDROME CORONAVIRUS 2 (SARS-COV-2) (CORONAVIRUS DISEASE [COVID-19]), AMPLIFIED PROBE TECHNIQUE, MAKING USE OF HIGH THROUGHPUT TECHNOLOGIES AS DESCRIBED BY CMS-2020-01-R: HCPCS

## 2021-11-11 PROCEDURE — 99214 OFFICE O/P EST MOD 30 MIN: CPT | Performed by: INTERNAL MEDICINE

## 2021-11-11 PROCEDURE — U0005 INFEC AGEN DETEC AMPLI PROBE: HCPCS

## 2021-11-11 ASSESSMENT — MIFFLIN-ST. JEOR: SCORE: 1204.78

## 2021-11-11 NOTE — H&P (VIEW-ONLY)
[facility]  [address] [place] [address]  Phone: [phone]  Fax: [phone]  Primary Provider: Ye Lira  Pre-op Performing Provider:    LUCÍA MCKEON  VIDEO,       PREOPERATIVE EVALUATION:  Today's date: 11/11/2021    Joanne Colon is a 36 year old female who presents for a preoperative evaluation.    Surgical Information:  Surgery/Procedure: Lumpectomy right breast  Surgery Location: [facility]  Surgeon: Dr Delaney Perdomo  Surgery Date: 11-  Time of Surgery: 11:00 ?  Where patient plans to recover: At home with family  Fax number for surgical facility: [phone]    Type of Anesthesia Anticipated: to be determined    Assessment & Plan     The proposed surgical procedure is considered INTERMEDIATE risk.    Problem List Items Addressed This Visit     Malignant neoplasm of nipple of right breast in female, estrogen receptor positive (H)      Other Visit Diagnoses     Preop general physical exam    -  Primary            RECOMMENDATION:  APPROVAL GIVEN to proceed with proposed procedure, without further diagnostic evaluation.  No aspirin or NSAIDs prior to the procedure.          Subjective     HPI related to upcoming procedure: Patient is a 36-year-old female who was diagnosed with invasive ductal carcinoma of the right breast in April of this year and has since been undergoing neoadjuvant chemotherapy who is going to undergo a lumpectomy on 11/15.  Patient is feeling well and happy to be done with chemotherapy.  No shortness of breath.  No other significant past medical history.  No history of obstructive sleep apnea or underlying lung disease.    Preop Questions 11/11/2021   1. Have you ever had a heart attack or stroke? No   2. Have you ever had surgery on your heart or blood vessels, such as a stent placement, a coronary artery bypass, or surgery on an artery in your head, neck, heart, or legs? No   3. Do you have chest pain with  activity? No   4. Do you have a history of  heart failure? No   5. Do you currently have a cold, bronchitis or symptoms of other infection? No   6. Do you have a cough, shortness of breath, or wheezing? No   7. Do you or anyone in your family have previous history of blood clots? No   8. Do you or does anyone in your family have a serious bleeding problem such as prolonged bleeding following surgeries or cuts? No   9. Have you ever had problems with anemia or been told to take iron pills? No   10. Have you had any abnormal blood loss such as black, tarry or bloody stools, or abnormal vaginal bleeding? No   11. Have you ever had a blood transfusion? No   12. Are you willing to have a blood transfusion if it is medically needed before, during, or after your surgery? Yes   13. Have you or any of your relatives ever had problems with anesthesia? No   14. Do you have sleep apnea, excessive snoring or daytime drowsiness? No   15. Do you have any artifical heart valves or other implanted medical devices like a pacemaker, defibrillator, or continuous glucose monitor? No   16. Do you have artificial joints? No   17. Are you allergic to latex? No   18. Is there any chance that you may be pregnant? No         Patient Active Problem List    Diagnosis Date Noted     Functional diarrhea 07/18/2021     Priority: Medium     Chemotherapy induced nausea and vomiting 07/18/2021     Priority: Medium     Neutropenic sepsis (H) 05/23/2021     Priority: Medium     Neutropenic fever (H)      Priority: Medium     Hepatitis      Priority: Medium     Mucositis due to chemotherapy      Priority: Medium     Lesion of liver less than 1 cm in diameter      Priority: Medium     Chemotherapy-induced neutropenia (H)      Priority: Medium     Fever and chills 05/22/2021     Priority: Medium     Malignant neoplasm of nipple of right breast in female, estrogen receptor positive (H) 04/22/2021     Priority: Medium     Encounter for antineoplastic  chemotherapy 2021     Priority: Medium     Breast mass, right 2021     Priority: Medium     Dx 2021          Past Medical History:   Diagnosis Date     Breast cancer (H)      Breast mass, right 2021    Dx 2021      Chemotherapy induced nausea and vomiting 2021     Chemotherapy-induced neutropenia (H)      Colitis, acute      Encounter for antineoplastic chemotherapy 2021     Fever and chills 2021     Functional diarrhea 2021     Hepatitis      Lesion of liver less than 1 cm in diameter      Malignant neoplasm of nipple of right breast in female, estrogen receptor positive (H) 2021     Mucositis due to chemotherapy      Neutropenic fever (H)      Neutropenic sepsis (H) 2021     Past Surgical History:   Procedure Laterality Date      SECTION      x two     EXAM UNDER ANESTHESIA ANUS N/A 10/20/2021    Procedure: EXAM UNDER ANESTHESIA,;  Surgeon: Jelly Zelaya MD;  Location: [facility] OR     FISTULOTOMY RECTUM N/A 10/20/2021    Procedure:   SETON placement ;  Surgeon: Jelly Zelaya MD;  Location: [facility] OR     IR CHEST PORT PLACEMENT > 5 YRS OF AGE  2021     IR PORT PLACEMENT >5 YEARS  2021     US BIOPSY FINE NEEDLE ASPIRATION LYMPH NODE (BREAST) RIGHT Right 2021     US BREAST CORE BIOPSY RIGHT Right 2021     No current outpatient medications on file.       No Known Allergies     Social History     Tobacco Use     Smoking status: Never Smoker     Smokeless tobacco: Never Used   Substance Use Topics     Alcohol use: Not Currently       History   Drug Use Unknown         Objective     There were no vitals taken for this visit.    Physical Exam    GENERAL APPEARANCE: healthy, alert and no distress     EYES: EOMI, PERRL     HENT: ear canals and TM's normal and nose and mouth without ulcers or lesions     NECK: no adenopathy, no asymmetry, masses, or scars and thyroid normal to palpation     RESP: lungs clear to auscultation - no  rales, rhonchi or wheezes     CV: regular rates and rhythm, normal S1 S2, no S3 or S4 and no murmur, click or rub     ABDOMEN:  soft, nontender, no HSM or masses and bowel sounds normal     MS: extremities normal- no gross deformities noted, no evidence of inflammation in joints, FROM in all extremities.     SKIN: no suspicious lesions or rashes     NEURO: Normal strength and tone, sensory exam grossly normal, mentation intact and speech normal     PSYCH: mentation appears normal. and affect normal/bright     LYMPHATICS: No cervical adenopathy    Recent Labs   Lab Test 10/18/21  0925 09/27/21  0949 05/23/21  0510 05/23/21  0051   HGB 12.0 11.4*   < > 11.3*    211   < > 187   INR  --   --   --  1.21*    139   < > 135*   POTASSIUM 3.8 3.9   < > 3.1*   CR 0.68 0.64   < > 0.64    < > = values in this interval not displayed.        Diagnostics:  No labs were ordered during this visit.   No EKG required, no history of coronary heart disease, significant arrhythmia, peripheral arterial disease or other structural heart disease.    Revised Cardiac Risk Index (RCRI):  The patient has the following serious cardiovascular risks for perioperative complications:   - No serious cardiac risks = 0 points     RCRI Interpretation: 0 points: Class I (very low risk - 0.4% complication rate)           Signed Electronically by: Sander Gould MD  Copy of this evaluation report is provided to requesting physician.

## 2021-11-11 NOTE — PROGRESS NOTES
[facility]  [address] [place] [id number]  Phone: [phone]  Fax: [phone]  Primary Provider: Ye Lira  Pre-op Performing Provider:    LUCÍA MCKEON  VIDEO,       PREOPERATIVE EVALUATION:  Today's date: 11/11/2021    Joanne Colon is a 36 year old female who presents for a preoperative evaluation.    Surgical Information:  Surgery/Procedure: Lumpectomy right breast  Surgery Location: [facility]  Surgeon: Dr Delaney Perdomo  Surgery Date: 11-  Time of Surgery: 11:00 ?  Where patient plans to recover: At home with family  Fax number for surgical facility: [phone]    Type of Anesthesia Anticipated: to be determined    Assessment & Plan     The proposed surgical procedure is considered INTERMEDIATE risk.    Problem List Items Addressed This Visit     Malignant neoplasm of nipple of right breast in female, estrogen receptor positive (H)      Other Visit Diagnoses     Preop general physical exam    -  Primary            RECOMMENDATION:  APPROVAL GIVEN to proceed with proposed procedure, without further diagnostic evaluation.  No aspirin or NSAIDs prior to the procedure.          Subjective     HPI related to upcoming procedure: Patient is a 36-year-old female who was diagnosed with invasive ductal carcinoma of the right breast in April of this year and has since been undergoing neoadjuvant chemotherapy who is going to undergo a lumpectomy on 11/15.  Patient is feeling well and happy to be done with chemotherapy.  No shortness of breath.  No other significant past medical history.  No history of obstructive sleep apnea or underlying lung disease.    Preop Questions 11/11/2021   1. Have you ever had a heart attack or stroke? No   2. Have you ever had surgery on your heart or blood vessels, such as a stent placement, a coronary artery bypass, or surgery on an artery in your head, neck, heart, or legs? No   3. Do you have chest pain with  activity? No   4. Do you have a history of  heart failure? No   5. Do you currently have a cold, bronchitis or symptoms of other infection? No   6. Do you have a cough, shortness of breath, or wheezing? No   7. Do you or anyone in your family have previous history of blood clots? No   8. Do you or does anyone in your family have a serious bleeding problem such as prolonged bleeding following surgeries or cuts? No   9. Have you ever had problems with anemia or been told to take iron pills? No   10. Have you had any abnormal blood loss such as black, tarry or bloody stools, or abnormal vaginal bleeding? No   11. Have you ever had a blood transfusion? No   12. Are you willing to have a blood transfusion if it is medically needed before, during, or after your surgery? Yes   13. Have you or any of your relatives ever had problems with anesthesia? No   14. Do you have sleep apnea, excessive snoring or daytime drowsiness? No   15. Do you have any artifical heart valves or other implanted medical devices like a pacemaker, defibrillator, or continuous glucose monitor? No   16. Do you have artificial joints? No   17. Are you allergic to latex? No   18. Is there any chance that you may be pregnant? No         Patient Active Problem List    Diagnosis Date Noted     Functional diarrhea 07/18/2021     Priority: Medium     Chemotherapy induced nausea and vomiting 07/18/2021     Priority: Medium     Neutropenic sepsis (H) 05/23/2021     Priority: Medium     Neutropenic fever (H)      Priority: Medium     Hepatitis      Priority: Medium     Mucositis due to chemotherapy      Priority: Medium     Lesion of liver less than 1 cm in diameter      Priority: Medium     Chemotherapy-induced neutropenia (H)      Priority: Medium     Fever and chills 05/22/2021     Priority: Medium     Malignant neoplasm of nipple of right breast in female, estrogen receptor positive (H) 04/22/2021     Priority: Medium     Encounter for antineoplastic  chemotherapy 2021     Priority: Medium     Breast mass, right 2021     Priority: Medium     Dx 2021          Past Medical History:   Diagnosis Date     Breast cancer (H)      Breast mass, right 2021    Dx 2021      Chemotherapy induced nausea and vomiting 2021     Chemotherapy-induced neutropenia (H)      Colitis, acute      Encounter for antineoplastic chemotherapy 2021     Fever and chills 2021     Functional diarrhea 2021     Hepatitis      Lesion of liver less than 1 cm in diameter      Malignant neoplasm of nipple of right breast in female, estrogen receptor positive (H) 2021     Mucositis due to chemotherapy      Neutropenic fever (H)      Neutropenic sepsis (H) 2021     Past Surgical History:   Procedure Laterality Date      SECTION      x two     EXAM UNDER ANESTHESIA ANUS N/A 10/20/2021    Procedure: EXAM UNDER ANESTHESIA,;  Surgeon: Jelly Zelaya MD;  Location: [facility] OR     FISTULOTOMY RECTUM N/A 10/20/2021    Procedure:   SETON placement ;  Surgeon: Jelly Zelaya MD;  Location: [facility] OR     IR CHEST PORT PLACEMENT > 5 YRS OF AGE  2021     IR PORT PLACEMENT >5 YEARS  2021     US BIOPSY FINE NEEDLE ASPIRATION LYMPH NODE (BREAST) RIGHT Right 2021     US BREAST CORE BIOPSY RIGHT Right 2021     No current outpatient medications on file.       No Known Allergies     Social History     Tobacco Use     Smoking status: Never Smoker     Smokeless tobacco: Never Used   Substance Use Topics     Alcohol use: Not Currently       History   Drug Use Unknown         Objective     There were no vitals taken for this visit.    Physical Exam    GENERAL APPEARANCE: healthy, alert and no distress     EYES: EOMI, PERRL     HENT: ear canals and TM's normal and nose and mouth without ulcers or lesions     NECK: no adenopathy, no asymmetry, masses, or scars and thyroid normal to palpation     RESP: lungs clear to auscultation - no  rales, rhonchi or wheezes     CV: regular rates and rhythm, normal S1 S2, no S3 or S4 and no murmur, click or rub     ABDOMEN:  soft, nontender, no HSM or masses and bowel sounds normal     MS: extremities normal- no gross deformities noted, no evidence of inflammation in joints, FROM in all extremities.     SKIN: no suspicious lesions or rashes     NEURO: Normal strength and tone, sensory exam grossly normal, mentation intact and speech normal     PSYCH: mentation appears normal. and affect normal/bright     LYMPHATICS: No cervical adenopathy    Recent Labs   Lab Test 10/18/21  0925 09/27/21  0949 05/23/21  0510 05/23/21  0051   HGB 12.0 11.4*   < > 11.3*    211   < > 187   INR  --   --   --  1.21*    139   < > 135*   POTASSIUM 3.8 3.9   < > 3.1*   CR 0.68 0.64   < > 0.64    < > = values in this interval not displayed.        Diagnostics:  No labs were ordered during this visit.   No EKG required, no history of coronary heart disease, significant arrhythmia, peripheral arterial disease or other structural heart disease.    Revised Cardiac Risk Index (RCRI):  The patient has the following serious cardiovascular risks for perioperative complications:   - No serious cardiac risks = 0 points     RCRI Interpretation: 0 points: Class I (very low risk - 0.4% complication rate)           Signed Electronically by: Sander Gould MD  Copy of this evaluation report is provided to requesting physician.

## 2021-11-12 LAB — SARS-COV-2 RNA RESP QL NAA+PROBE: NEGATIVE

## 2021-11-15 ENCOUNTER — ANESTHESIA EVENT (OUTPATIENT)
Dept: SURGERY | Facility: HOSPITAL | Age: 36
End: 2021-11-15
Payer: MEDICAID

## 2021-11-15 ENCOUNTER — HOSPITAL ENCOUNTER (OUTPATIENT)
Dept: NUCLEAR MEDICINE | Facility: HOSPITAL | Age: 36
End: 2021-11-15
Attending: SPECIALIST | Admitting: SPECIALIST
Payer: MEDICAID

## 2021-11-15 ENCOUNTER — ANCILLARY PROCEDURE (OUTPATIENT)
Dept: MAMMOGRAPHY | Facility: CLINIC | Age: 36
End: 2021-11-15
Attending: SPECIALIST
Payer: MEDICAID

## 2021-11-15 ENCOUNTER — HOSPITAL ENCOUNTER (OUTPATIENT)
Facility: HOSPITAL | Age: 36
Discharge: HOME | End: 2021-11-15
Attending: SPECIALIST | Admitting: SPECIALIST
Payer: MEDICAID

## 2021-11-15 ENCOUNTER — ANESTHESIA (OUTPATIENT)
Dept: SURGERY | Facility: HOSPITAL | Age: 36
End: 2021-11-15
Payer: MEDICAID

## 2021-11-15 VITALS
HEART RATE: 108 BPM | TEMPERATURE: 97.9 F | OXYGEN SATURATION: 95 % | BODY MASS INDEX: 25.58 KG/M2 | WEIGHT: 131 LBS | SYSTOLIC BLOOD PRESSURE: 133 MMHG | DIASTOLIC BLOOD PRESSURE: 80 MMHG | RESPIRATION RATE: 18 BRPM

## 2021-11-15 DIAGNOSIS — C50.611 MALIGNANT NEOPLASM OF AXILLARY TAIL OF RIGHT BREAST IN FEMALE, ESTROGEN RECEPTOR POSITIVE (H): ICD-10-CM

## 2021-11-15 DIAGNOSIS — Z17.0 MALIGNANT NEOPLASM OF AXILLARY TAIL OF RIGHT BREAST IN FEMALE, ESTROGEN RECEPTOR POSITIVE (H): ICD-10-CM

## 2021-11-15 PROCEDURE — 250N000009 HC RX 250: Performed by: SPECIALIST

## 2021-11-15 PROCEDURE — 710N000012 HC RECOVERY PHASE 2, PER MINUTE: Performed by: SPECIALIST

## 2021-11-15 PROCEDURE — 999N000141 HC STATISTIC PRE-PROCEDURE NURSING ASSESSMENT: Performed by: SPECIALIST

## 2021-11-15 PROCEDURE — C1819 TISSUE LOCALIZATION-EXCISION: HCPCS

## 2021-11-15 PROCEDURE — 250N000011 HC RX IP 250 OP 636: Performed by: NURSE ANESTHETIST, CERTIFIED REGISTERED

## 2021-11-15 PROCEDURE — C1760 CLOSURE DEV, VASC: HCPCS | Performed by: SPECIALIST

## 2021-11-15 PROCEDURE — 360N000075 HC SURGERY LEVEL 2, PER MIN: Performed by: SPECIALIST

## 2021-11-15 PROCEDURE — 38525 BIOPSY/REMOVAL LYMPH NODES: CPT | Mod: RT | Performed by: SPECIALIST

## 2021-11-15 PROCEDURE — 250N000011 HC RX IP 250 OP 636: Performed by: SPECIALIST

## 2021-11-15 PROCEDURE — A9520 TC99 TILMANOCEPT DIAG 0.5MCI: HCPCS | Performed by: SPECIALIST

## 2021-11-15 PROCEDURE — 343N000001 HC RX 343: Performed by: SPECIALIST

## 2021-11-15 PROCEDURE — 370N000017 HC ANESTHESIA TECHNICAL FEE, PER MIN: Performed by: SPECIALIST

## 2021-11-15 PROCEDURE — 258N000003 HC RX IP 258 OP 636: Performed by: ANESTHESIOLOGY

## 2021-11-15 PROCEDURE — 88307 TISSUE EXAM BY PATHOLOGIST: CPT | Mod: TC | Performed by: SPECIALIST

## 2021-11-15 PROCEDURE — 999N000065 MA POST PROCEDURE RIGHT

## 2021-11-15 PROCEDURE — 19125 EXCISION BREAST LESION: CPT | Mod: RT | Performed by: SPECIALIST

## 2021-11-15 PROCEDURE — 272N000001 HC OR GENERAL SUPPLY STERILE: Performed by: SPECIALIST

## 2021-11-15 RX ORDER — SODIUM CHLORIDE, SODIUM LACTATE, POTASSIUM CHLORIDE, CALCIUM CHLORIDE 600; 310; 30; 20 MG/100ML; MG/100ML; MG/100ML; MG/100ML
INJECTION, SOLUTION INTRAVENOUS CONTINUOUS
Status: CANCELLED | OUTPATIENT
Start: 2021-11-15

## 2021-11-15 RX ORDER — PROPOFOL 10 MG/ML
INJECTION, EMULSION INTRAVENOUS CONTINUOUS PRN
Status: DISCONTINUED | OUTPATIENT
Start: 2021-11-15 | End: 2021-11-15

## 2021-11-15 RX ORDER — FENTANYL CITRATE 50 UG/ML
INJECTION, SOLUTION INTRAMUSCULAR; INTRAVENOUS PRN
Status: DISCONTINUED | OUTPATIENT
Start: 2021-11-15 | End: 2021-11-15

## 2021-11-15 RX ORDER — FENTANYL CITRATE 50 UG/ML
25 INJECTION, SOLUTION INTRAMUSCULAR; INTRAVENOUS
Status: CANCELLED | OUTPATIENT
Start: 2021-11-15

## 2021-11-15 RX ORDER — ONDANSETRON 2 MG/ML
INJECTION INTRAMUSCULAR; INTRAVENOUS PRN
Status: DISCONTINUED | OUTPATIENT
Start: 2021-11-15 | End: 2021-11-15

## 2021-11-15 RX ORDER — DEXAMETHASONE SODIUM PHOSPHATE 4 MG/ML
INJECTION, SOLUTION INTRA-ARTICULAR; INTRALESIONAL; INTRAMUSCULAR; INTRAVENOUS; SOFT TISSUE PRN
Status: DISCONTINUED | OUTPATIENT
Start: 2021-11-15 | End: 2021-11-15

## 2021-11-15 RX ORDER — ACETAMINOPHEN 325 MG/1
975 TABLET ORAL
Status: CANCELLED | OUTPATIENT
Start: 2021-11-15

## 2021-11-15 RX ORDER — LABETALOL HYDROCHLORIDE 5 MG/ML
5 INJECTION, SOLUTION INTRAVENOUS EVERY 5 MIN PRN
Status: CANCELLED | OUTPATIENT
Start: 2021-11-15

## 2021-11-15 RX ORDER — BUPIVACAINE HYDROCHLORIDE 2.5 MG/ML
INJECTION, SOLUTION INFILTRATION; PERINEURAL PRN
Status: DISCONTINUED | OUTPATIENT
Start: 2021-11-15 | End: 2021-11-15 | Stop reason: HOSPADM

## 2021-11-15 RX ORDER — HYDROCODONE BITARTRATE AND ACETAMINOPHEN 5; 325 MG/1; MG/1
1 TABLET ORAL
Status: CANCELLED | OUTPATIENT
Start: 2021-11-15

## 2021-11-15 RX ORDER — HYDROCODONE BITARTRATE AND ACETAMINOPHEN 5; 325 MG/1; MG/1
1 TABLET ORAL EVERY 6 HOURS PRN
Qty: 15 TABLET | Refills: 0 | Status: SHIPPED | OUTPATIENT
Start: 2021-11-15 | End: 2021-11-23

## 2021-11-15 RX ORDER — SODIUM CHLORIDE, SODIUM LACTATE, POTASSIUM CHLORIDE, CALCIUM CHLORIDE 600; 310; 30; 20 MG/100ML; MG/100ML; MG/100ML; MG/100ML
INJECTION, SOLUTION INTRAVENOUS CONTINUOUS
Status: DISCONTINUED | OUTPATIENT
Start: 2021-11-15 | End: 2021-11-15 | Stop reason: HOSPADM

## 2021-11-15 RX ORDER — HYDROMORPHONE HCL IN WATER/PF 6 MG/30 ML
0.2 PATIENT CONTROLLED ANALGESIA SYRINGE INTRAVENOUS EVERY 5 MIN PRN
Status: CANCELLED | OUTPATIENT
Start: 2021-11-15

## 2021-11-15 RX ORDER — LIDOCAINE 40 MG/G
CREAM TOPICAL
Status: DISCONTINUED | OUTPATIENT
Start: 2021-11-15 | End: 2021-11-15 | Stop reason: HOSPADM

## 2021-11-15 RX ORDER — HALOPERIDOL 5 MG/ML
1 INJECTION INTRAMUSCULAR
Status: CANCELLED | OUTPATIENT
Start: 2021-11-15

## 2021-11-15 RX ORDER — FENTANYL CITRATE 50 UG/ML
25-50 INJECTION, SOLUTION INTRAMUSCULAR; INTRAVENOUS EVERY 5 MIN PRN
Status: CANCELLED | OUTPATIENT
Start: 2021-11-15

## 2021-11-15 RX ORDER — ONDANSETRON 2 MG/ML
4 INJECTION INTRAMUSCULAR; INTRAVENOUS EVERY 30 MIN PRN
Status: CANCELLED | OUTPATIENT
Start: 2021-11-15

## 2021-11-15 RX ORDER — ONDANSETRON 4 MG/1
4 TABLET, ORALLY DISINTEGRATING ORAL EVERY 30 MIN PRN
Status: CANCELLED | OUTPATIENT
Start: 2021-11-15

## 2021-11-15 RX ORDER — IBUPROFEN 600 MG/1
600 TABLET, FILM COATED ORAL
Status: CANCELLED | OUTPATIENT
Start: 2021-11-15

## 2021-11-15 RX ORDER — OXYCODONE HYDROCHLORIDE 5 MG/1
5 TABLET ORAL EVERY 4 HOURS PRN
Status: CANCELLED | OUTPATIENT
Start: 2021-11-15

## 2021-11-15 RX ADMIN — ONDANSETRON 4 MG: 2 INJECTION INTRAMUSCULAR; INTRAVENOUS at 11:15

## 2021-11-15 RX ADMIN — FENTANYL CITRATE 50 MCG: 50 INJECTION, SOLUTION INTRAMUSCULAR; INTRAVENOUS at 11:10

## 2021-11-15 RX ADMIN — TILMANOCEPT 0.53 MILLICURIE: KIT at 10:16

## 2021-11-15 RX ADMIN — LIDOCAINE HYDROCHLORIDE 10 ML: 10 INJECTION, SOLUTION INFILTRATION; PERINEURAL at 09:06

## 2021-11-15 RX ADMIN — DEXAMETHASONE SODIUM PHOSPHATE 10 MG: 4 INJECTION, SOLUTION INTRA-ARTICULAR; INTRALESIONAL; INTRAMUSCULAR; INTRAVENOUS; SOFT TISSUE at 11:15

## 2021-11-15 RX ADMIN — SODIUM CHLORIDE, POTASSIUM CHLORIDE, SODIUM LACTATE AND CALCIUM CHLORIDE: 600; 310; 30; 20 INJECTION, SOLUTION INTRAVENOUS at 10:47

## 2021-11-15 RX ADMIN — MIDAZOLAM HYDROCHLORIDE 2 MG: 1 INJECTION, SOLUTION INTRAMUSCULAR; INTRAVENOUS at 11:03

## 2021-11-15 RX ADMIN — PROPOFOL 100 MCG/KG/MIN: 10 INJECTION, EMULSION INTRAVENOUS at 11:12

## 2021-11-15 RX ADMIN — FENTANYL CITRATE 50 MCG: 50 INJECTION, SOLUTION INTRAMUSCULAR; INTRAVENOUS at 11:12

## 2021-11-15 NOTE — DISCHARGE INSTRUCTIONS
Discharge Instructions: After Your Surgery  You ve just had surgery. During surgery, you were given medicine called anesthesia to keep you relaxed and free of pain. After surgery, you may have some pain or nausea. This is common. Here are some tips for feeling better and getting well after surgery.     Stay on schedule with your medicine.   Going home  Your healthcare provider will show you how to take care of yourself when you go home. He or she will also answer your questions. Have an adult family member or friend drive you home. For the first 24 hours after your surgery:    Don't drive or use heavy equipment.    Don't make important decisions or sign legal papers.    Don't drink alcohol.    Have someone stay with you, if needed. He or she can watch for problems and help keep you safe.  Be sure to go to all follow-up visits with your healthcare provider. And rest after your surgery for as long as your healthcare provider tells you to.  Coping with pain  If you have pain after surgery, pain medicine will help you feel better. Take it as told, before pain becomes severe. Also, ask your healthcare provider or pharmacist about other ways to control pain. This might be with heat, ice, or relaxation. And follow any other instructions your surgeon or nurse gives you.  Tips for taking pain medicine  To get the best relief possible, remember these points:    Pain medicines can upset your stomach. Taking them with a little food may help.    Most pain relievers taken by mouth need at least 20 to 30 minutes to start to work.    Don't wait till your pain becomes severe before you take your medicine. Try to time your medicine so that you can take it before starting an activity. This might be before you get dressed, go for a walk, or sit down for dinner.    Constipation is a common side effect of pain medicines. Call your healthcare provider before taking any medicines such as laxatives or stool softeners to help ease  constipation. Also ask if you should skip any foods. Drinking lots of fluids and eating foods such as fruits and vegetables that are high in fiber can also help. Remember, don't take laxatives unless your surgeon has prescribed them.    Drinking alcohol and taking pain medicine can cause dizziness and slow your breathing. It can even be deadly. Don't drink alcohol while taking pain medicine.    Pain medicine can make you react more slowly to things. Don't drive or run machinery while taking pain medicine.  Your healthcare provider may tell you to take acetaminophen to help ease your pain. Ask him or her how much you are supposed to take each day. Acetaminophen or other pain relievers may interact with your prescription medicines or other over-the-counter (OTC) medicines. Some prescription medicines have acetaminophen and other ingredients. Using both prescription and OTC acetaminophen for pain can cause you to overdose. Read the labels on your OTC medicines with care. This will help you to clearly know the list of ingredients, how much to take, and any warnings. It may also help you not take too much acetaminophen. If you have questions or don't understand the information, ask your pharmacist or healthcare provider to explain it to you before you take the OTC medicine.  Managing nausea  Some people have an upset stomach after surgery. This is often because of anesthesia, pain, or pain medicine, or the stress of surgery. These tips will help you handle nausea and eat healthy foods as you get better. If you were on a special food plan before surgery, ask your healthcare provider if you should follow it while you get better. These tips may help:    Don't push yourself to eat. Your body will tell you when to eat and how much.    Start off with clear liquids and soup. They are easier to digest.    Next try semi-solid foods, such as mashed potatoes, applesauce, and gelatin, as you feel ready.    Slowly move to solid  foods. Don t eat fatty, rich, or spicy foods at first.    Don't force yourself to have 3 large meals a day. Instead eat smaller amounts more often.    Take pain medicines with a small amount of solid food, such as crackers or toast, to prevent nausea.  When to call your healthcare provider  Call your healthcare provider if:    You still have intolerable pain an hour after taking medicine. The medicine may not be strong enough.    You feel too sleepy, dizzy, or groggy. The medicine may be too strong.    You have side effects such as nausea or vomiting, or skin changes such as rash, itching, or hives. Your healthcare provider may suggest other medicines to control side effects.  Rash, itching, or hives may mean you have an allergic reaction. Report this right away. If you have trouble breathing or facial swelling, call 911 right away.  If you have obstructive sleep apnea  You were given anesthesia medicine during surgery to keep you comfortable and free of pain. After surgery, you may have more apnea spells because of this medicine and other medicines you were given. The spells may last longer than usual.   At home:    Keep using the continuous positive airway pressure (CPAP) device when you sleep. Unless your healthcare provider tells you not to, use it when you sleep, day or night. CPAP is a common device used to treat obstructive sleep apnea.    Talk with your provider before taking any pain medicine, muscle relaxants, or sedatives. Your provider will tell you about the possible dangers of taking these medicines.  [employer] last reviewed this educational content on 3/1/2019    1483-8379 [employer]. All rights reserved. This information is not intended as a substitute for professional medical care. Always follow your healthcare professional's instructions.

## 2021-11-15 NOTE — ANESTHESIA CARE TRANSFER NOTE
Patient: Joanne Colon    Procedure: Procedure(s):  Right lumpectomy after Wire Localization,  [place] Lymph Node Biopsy       Diagnosis: Malignant neoplasm of axillary tail of right breast in female, estrogen receptor positive (H) [C50.611, Z17.0]  Diagnosis Additional Information: No value filed.    Anesthesia Type:   No value filed.     Note:    Oropharynx: spontaneously breathing and oropharynx clear of all foreign objects  Level of Consciousness: awake  Oxygen Supplementation: room air    Independent Airway: airway patency satisfactory and stable  Dentition: dentition unchanged  Vital Signs Stable: post-procedure vital signs reviewed and stable  Report to RN Given: handoff report given  Patient transferred to: Phase II    Handoff Report: Identifed the Patient, Identified the Reponsible Provider, Reviewed the pertinent medical history, Discussed the surgical course, Reviewed Intra-OP anesthesia mangement and issues during anesthesia, Set expectations for post-procedure period and Allowed opportunity for questions and acknowledgement of understanding      Vitals:  Vitals Value Taken Time   /73 11/15/21 1215   Temp 37  C (98.6  F) 11/15/21 1211   Pulse 99 11/15/21 1215   Resp 14      SpO2 96 % 11/15/21 1215   Vitals shown include unvalidated device data.    Electronically Signed By: NOHEMI Ponce CRNA  November 15, 2021  12:16 PM

## 2021-11-15 NOTE — ANESTHESIA PREPROCEDURE EVALUATION
Anesthesia Pre-Procedure Evaluation    Patient: Joanne Colon   MRN: [id number] : 1985        Preoperative Diagnosis: Malignant neoplasm of axillary tail of right breast in female, estrogen receptor positive (H) [C50.611, Z17.0]    Procedure : Procedure(s):  Right lumpectomy after Wire Localization,  [place] Lymph Node Biopsy          Past Medical History:   Diagnosis Date     Breast cancer (H)      Breast mass, right 2021    Dx 2021      Chemotherapy induced nausea and vomiting 2021     Chemotherapy-induced neutropenia (H)      Colitis, acute      Encounter for antineoplastic chemotherapy 2021     Fever and chills 2021     Functional diarrhea 2021     Hepatitis      Lesion of liver less than 1 cm in diameter      Malignant neoplasm of nipple of right breast in female, estrogen receptor positive (H) 2021     Mucositis due to chemotherapy      Neutropenic fever (H)      Neutropenic sepsis (H) 2021      Past Surgical History:   Procedure Laterality Date      SECTION      x two     EXAM UNDER ANESTHESIA ANUS N/A 10/20/2021    Procedure: EXAM UNDER ANESTHESIA,;  Surgeon: Jelly Zelaya MD;  Location: [facility] OR     FISTULOTOMY RECTUM N/A 10/20/2021    Procedure:   SETON placement ;  Surgeon: Jelly Zelaya MD;  Location: [facility] OR     IR CHEST PORT PLACEMENT > 5 YRS OF AGE  2021     IR PORT PLACEMENT >5 YEARS  2021     US BIOPSY FINE NEEDLE ASPIRATION LYMPH NODE (BREAST) RIGHT Right 2021     US BREAST CORE BIOPSY RIGHT Right 2021      No Known Allergies   Social History     Tobacco Use     Smoking status: Never Smoker     Smokeless tobacco: Never Used   Substance Use Topics     Alcohol use: Not Currently      Wt Readings from Last 1 Encounters:   21 60.3 kg (133 lb)        Anesthesia Evaluation   Pt has had prior anesthetic.     No history of anesthetic complications       ROS/MED HX  ENT/Pulmonary:  - neg pulmonary ROS      Neurologic:  - neg neurologic ROS     Cardiovascular:  - neg cardiovascular ROS     METS/Exercise Tolerance: >4 METS    Hematologic:  - neg hematologic  ROS     Musculoskeletal:  - neg musculoskeletal ROS     GI/Hepatic:     (+) hepatitis     Renal/Genitourinary:  - neg Renal ROS     Endo:  - neg endo ROS     Psychiatric/Substance Use:  - neg psychiatric ROS     Infectious Disease:  - neg infectious disease ROS     Malignancy:   (+) Malignancy, History of Breast.Breast CA Active status post Chemo.        Other:  - neg other ROS          Physical Exam    Airway        Mallampati: III   TM distance: > 3 FB   Neck ROM: full     Respiratory Devices and Support         Dental  no notable dental history         Cardiovascular   cardiovascular exam normal          Pulmonary   pulmonary exam normal            Other findings:     ECHO 4/23/21:    Narrative & Impression  1. Normal left ventricular size and systolic performance with a visually estimated ejection fraction of 65%.   2. No significant valvular heart disease is identified on this study.   3. Normal right ventricular size and systolic performance.     OUTSIDE LABS:  CBC:   Lab Results   Component Value Date    WBC 4.5 10/18/2021    WBC 6.6 09/27/2021    HGB 12.0 10/18/2021    HGB 11.4 (L) 09/27/2021    HCT 36.7 10/18/2021    HCT 35.1 09/27/2021     10/18/2021     09/27/2021     BMP:   Lab Results   Component Value Date     10/18/2021     09/27/2021    POTASSIUM 3.8 10/18/2021    POTASSIUM 3.9 09/27/2021    CHLORIDE 105 10/18/2021    CHLORIDE 105 09/27/2021    CO2 23 10/18/2021    CO2 22 09/27/2021    BUN 9 10/18/2021    BUN 12 09/27/2021    CR 0.68 10/18/2021    CR 0.64 09/27/2021    GLC 92 10/18/2021     09/27/2021     COAGS:   Lab Results   Component Value Date    PTT 35 05/23/2021    INR 1.21 (H) 05/23/2021     POC:   Lab Results   Component Value Date    HCG Negative 10/20/2021    HCGS Negative 05/23/2021     HEPATIC:   Lab  Results   Component Value Date    ALBUMIN 4.0 10/18/2021    PROTTOTAL 7.0 10/18/2021    ALT 30 10/18/2021    AST 27 10/18/2021    ALKPHOS 76 10/18/2021    BILITOTAL 0.5 10/18/2021     OTHER:   Lab Results   Component Value Date    LACT 0.7 05/23/2021    TANNER 9.6 10/18/2021    MAG 1.8 06/24/2021    CRP <0.1 04/01/2021       Anesthesia Plan    ASA Status:  3   NPO Status:  NPO Appropriate    Anesthesia Type: MAC.      Maintenance: TIVA.        Consents    Anesthesia Plan(s) and associated risks, benefits, and realistic alternatives discussed. Questions answered and patient/representative(s) expressed understanding.     - Discussed with:  Patient         Postoperative Care    Pain management: Multi-modal analgesia.   PONV prophylaxis: Ondansetron (or other 5HT-3), Dexamethasone or Solumedrol     Comments:                Cherry Valdovinos MD

## 2021-11-15 NOTE — ANESTHESIA POSTPROCEDURE EVALUATION
Patient: Joanne Colon    Procedure: Procedure(s):  Right lumpectomy after Wire Localization,  [place] Lymph Node Biopsy       Diagnosis:Malignant neoplasm of axillary tail of right breast in female, estrogen receptor positive (H) [C50.611, Z17.0]  Diagnosis Additional Information: No value filed.    Anesthesia Type:  MAC    Note:  Disposition: Outpatient   Postop Pain Control: Uneventful            Sign Out: Well controlled pain   PONV: No   Neuro/Psych: Uneventful            Sign Out: Acceptable/Baseline neuro status   Airway/Respiratory: Uneventful            Sign Out: Acceptable/Baseline resp. status   CV/Hemodynamics: Uneventful            Sign Out: Acceptable CV status; No obvious hypovolemia; No obvious fluid overload   Other NRE: NONE   DID A NON-ROUTINE EVENT OCCUR? No           Last vitals:  Vitals Value Taken Time   /81 11/15/21 1300   Temp 37  C (98.6  F) 11/15/21 1211   Pulse 102 11/15/21 1309   Resp     SpO2 96 % 11/15/21 1309   Vitals shown include unvalidated device data.    Electronically Signed By: Cherry Valdovinos MD  November 15, 2021  1:10 PM

## 2021-11-16 NOTE — PROGRESS NOTES
[facility] Hematology and Oncology Progress Note    Patient: Joanne Colon  MRN: [id number]  Date of Service: Nov 9, 2021         Reason for Visit    Chief Complaint   Patient presents with     Oncology Clinic Visit       Assessment and Plan    Cancer Staging  Malignant neoplasm of nipple of right breast in female, estrogen receptor positive (H)  Staging form: Breast, AJCC 8th Edition  - Clinical stage from 4/21/2021: Stage IB (cT2, cN1(f), cM0, G3, ER+, LA+, HER2+) - Signed by Chelly Sanchez MD on 8/17/2021      ECOG Performance    0 - Independent     Pain  Pain Score: No Pain (0)    #.  cT2 N1 M0 invasive ductal carcinoma, grade 3, triple positive.   Clinically stable.  No new clinical concern.  She will proceed with adjuvant trastuzumab and pertuzumab while awaiting right breast lumpectomy next week.     Follow-up with me in 3 weeks with labs and infusion appointment as well as to review the pathology result to finalize adjuvant treatment plan.    #, Completed hereditary breast and ovarian cancer gene panels and tested negative for 37 genes analyzed.  (9/2/2021)    Encounter Diagnoses:    Problem List Items Addressed This Visit     Encounter for antineoplastic chemotherapy      Other Visit Diagnoses     Malignant neoplasm of upper-inner quadrant of right breast in female, estrogen receptor positive (H)    -  Primary    Relevant Orders    CBC with Platelets & Differential    Comprehensive metabolic panel             CC: Ye Lira MD   ______________________________________________________________________________  Diagnosis  4/2021-presented to the emergency room with a mass in her right breast along with pain for about 3 months or so.  Denies nipple changes or discharge.  Underwent diagnostic mammogram and ultrasound on 4/13/2021 and it confirmed right breast mass in the 8 o'clock position, 5 cm from the nipple of 2.2 x 2.8 x 1.4 cm with abnormal appearing right axillary lymph node of 1.7 cm with  cortical thickness.  She underwent ultrasound-guided core needle biopsy on the same day and it showed invasive ductal carcinoma, grade 3, ER moderate to strong positive, MA strongly positive, HER-2 positive by IHC.              - She met with Dr. Perdomo from surgery and recommended to consider neoadjuvant chemotherapy to HER-2 positive lymph node positive disease.     LMP- 5/10/2021     Treatment to date  5/13/2021-8/26/2021 neoadjuvant TCHP x6.  Required hospitalization with neutropenic fever, neutropenic colitis after cycle #1.  Neulasta added starting cycle #2 chemotherapy.  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 Kierra Zelaya)  Continue trastuzumab and Pertuzumab while awaiting right breast lumpectomy.  11/15/2021-anticipated right breast lumpectomy and right sentinel lymph node biopsy.    History of Present Illness    Ms. Joanne Colon presented today accompanied by her sister.    She is scheduled for right breast lumpectomy and sentinel lymph node biopsy next week.  She does not have diarrhea.  Overall she tolerates well on current therapy with trastuzumab and Pertuzumab.    Review of systems  Apart from describing in HPI, the remainder of comprehensive ROS was negative.    Past History    Past Medical History:   Diagnosis Date     Breast cancer (H)      Breast mass, right 4/9/2021    Dx April 2021      Chemotherapy induced nausea and vomiting 7/18/2021     Chemotherapy-induced neutropenia (H)      Colitis, acute      Encounter for antineoplastic chemotherapy 4/22/2021     Fever and chills 5/22/2021     Functional diarrhea 7/18/2021     Hepatitis      Lesion of liver less than 1 cm in diameter      Malignant neoplasm of nipple of right breast in female, estrogen receptor positive (H) 4/22/2021     Mucositis due to chemotherapy      Neutropenic fever (H)      Neutropenic  sepsis (H) 2021       Past Surgical History:   Procedure Laterality Date      SECTION      x two     EXAM UNDER ANESTHESIA ANUS N/A 10/20/2021    Procedure: EXAM UNDER ANESTHESIA,;  Surgeon: Jelly Zelaya MD;  Location: [facility] OR     FISTULOTOMY RECTUM N/A 10/20/2021    Procedure:   SETON placement ;  Surgeon: Jelly Zelaya MD;  Location: [facility] OR     IR CHEST PORT PLACEMENT > 5 YRS OF AGE  2021     IR PORT PLACEMENT >5 YEARS  2021     US BIOPSY FINE NEEDLE ASPIRATION LYMPH NODE (BREAST) RIGHT Right 2021     US BREAST CORE BIOPSY RIGHT Right 2021         Physical Exam    /73   Pulse 71   Temp 98.3  F (36.8  C)   Resp 16   Wt 58.9 kg (129 lb 14.4 oz)   SpO2 99%   BMI 25.37 kg/m      General: alert, awake, not in acute distress  HEENT: Head: Normal, normocephalic, atraumatic.  Eye: Normal external eye, conjunctiva, lids cornea, LISSETH.  Nose: Normal external nose, mucus membranes and septum.  Pharynx: Normal buccal mucosa. Normal pharynx.  Neck / Thyroid: Supple, no masses, nodes, nodules or enlargement.  Lymphatics: No abnormally enlarged lymph nodes.  Chest: Normal chest wall and respirations. Clear to auscultation.  Heart: S1 S2 RRR, no murmur.   Abdomen: abdomen is soft without significant tenderness, masses, organomegaly or guarding  Extremities: normal strength, tone, and muscle mass  Skin: normal. no rash or abnormalities  CNS: non focal.    Lab Results    Recent Results (from the past 168 hour(s))   Extra Red Top Tube   Result Value Ref Range    Hold Specimen JIC    Extra Green Top (Lithium Heparin) Tube   Result Value Ref Range    Hold Specimen JIC    Extra Purple Top Tube   Result Value Ref Range    Hold Specimen JIC    Asymptomatic COVID-19 Virus (Coronavirus) by PCR Nose    Specimen: Nose; Swab   Result Value Ref Range    SARS CoV2 PCR Negative Negative, Testing sent to reference lab. Results will be returned via unsolicited result       Imaging    Image  Guided Breast Loc w [place] Node Inj Right    Result Date: 11/15/2021  INDICATION: Pre-operative localization of invasive ductal carcinoma at 9 o'clock, 5 cm from the nipple. PROCEDURE: Informed consent was obtained from the patient. The breast was cleansed with ChloraPrep. Lidocaine was used for local anesthesia. A -gauge needle was then advanced to the area of abnormality. A localization wire was then deployed.  was injected subdermally along the upper outer aspect of the areolar margin. Post-procedure mammograms demonstrate the localization wire in appropriate position. The previously placed marker was visualized. The patient tolerated this well. IMPRESSION: Sonographically guided right wire localization. A specimen was not sent for radiography. [place] Lymph Node Injection:  530 [address] lymph[address]ek Right upper outer major-areolar region of right breast Bernard Negro I spent greater than 50% of the 30 minutes on the date of service on counseling and coordination of care.    Signed by: Chelly Sanchez MD

## 2021-11-17 LAB
PATH REPORT.COMMENTS IMP SPEC: NORMAL
PATH REPORT.COMMENTS IMP SPEC: NORMAL
PATH REPORT.FINAL DX SPEC: NORMAL
PATH REPORT.GROSS SPEC: NORMAL
PATH REPORT.MICROSCOPIC SPEC OTHER STN: NORMAL
PATH REPORT.RELEVANT HX SPEC: NORMAL
PATHOLOGY SYNOPTIC REPORT: NORMAL
PHOTO IMAGE: NORMAL

## 2021-11-17 PROCEDURE — 88307 TISSUE EXAM BY PATHOLOGIST: CPT | Mod: 26 | Performed by: PATHOLOGY

## 2021-11-23 ENCOUNTER — OFFICE VISIT (OUTPATIENT)
Dept: SURGERY | Facility: CLINIC | Age: 36
End: 2021-11-23
Attending: FAMILY MEDICINE
Payer: MEDICAID

## 2021-11-23 DIAGNOSIS — C50.611 MALIGNANT NEOPLASM OF AXILLARY TAIL OF RIGHT BREAST IN FEMALE, ESTROGEN RECEPTOR POSITIVE (H): Primary | ICD-10-CM

## 2021-11-23 DIAGNOSIS — Z48.89 POSTOPERATIVE VISIT: ICD-10-CM

## 2021-11-23 DIAGNOSIS — Z17.0 MALIGNANT NEOPLASM OF AXILLARY TAIL OF RIGHT BREAST IN FEMALE, ESTROGEN RECEPTOR POSITIVE (H): Primary | ICD-10-CM

## 2021-11-23 PROCEDURE — G0463 HOSPITAL OUTPT CLINIC VISIT: HCPCS

## 2021-11-23 PROCEDURE — 99024 POSTOP FOLLOW-UP VISIT: CPT | Performed by: SPECIALIST

## 2021-11-23 NOTE — PROGRESS NOTES
In for follow-up of her right lumpectomy  with sentinel lymph node biopsy.  She is feeling well.  She is having very minimal pain.      Physical exam:  Appears well.  Does not appear in any discomfort.  Breasts: Incisions are healing nicely with no signs of infection.  No swelling.    Pathology: There was no residual tumor.  Lymph nodes were also negative including the initially biopsied positive node.     Impression: Postop visit. Doing well.  Reassured her that this is an indicator of a very good outcome.  They asked why she still needs radiation and explained that this is part of the treatment protocols.  She is at risk for recurrence without it.    Plan:   We'll refer her onto [facility] radiation oncology oncology.  Follow-up with me can somewhat be at her discretion.  I am not sure when she will be going back to [place].  She definitely needs to continue with yearly mammograms.

## 2021-11-23 NOTE — LETTER
11/23/2021         RE: Joanne Colon  [address] [place] [address]        Dear Colleague,    Thank you for referring your patient, Joanne Colon, to the [facility] [place]. Please see a copy of my visit note below.    In for follow-up of her right lumpectomy  with sentinel lymph node biopsy.  She is feeling well.  She is having very minimal pain.      Physical exam:  Appears well.  Does not appear in any discomfort.  Breasts: Incisions are healing nicely with no signs of infection.  No swelling.    Pathology: There was no residual tumor.  Lymph nodes were also negative including the initially biopsied positive node.     Impression: Postop visit. Doing well.  Reassured her that this is an indicator of a very good outcome.  They asked why she still needs radiation and explained that this is part of the treatment protocols.  She is at risk for recurrence without it.    Plan:   We'll refer her onto [facility] radiation oncology oncology.  Follow-up with me can somewhat be at her discretion.  I am not sure when she will be going back to her home country.  She definitely needs to continue with yearly mammograms.        Again, thank you for allowing me to participate in the care of your patient.        Sincerely,        Delaney Perdomo MD

## 2021-11-23 NOTE — NURSING NOTE
Joanne presents to [facility] for a post op appointment with Dr. Perdomo .  She is accompanied by her sister for appointment.  She states she is doing well, minimal pain.  She has limited ROM due to being afraid she will hurt her surgery site, reviewed ROM exercises with her, told her ok to perform her arm exercises 2-4 times a day.  Patient met with Dr. Perdomo .  See dictation for details of visit.  She will plan to be referred onto  Radiation Oncology and will plan to follow up with Dr. Perdomo as needed.  She will continue with bilateral mammograms.  Invited calls sooner if she has any questions.  RN time 15 mins.

## 2021-11-26 ENCOUNTER — TELEPHONE (OUTPATIENT)
Dept: RADIATION ONCOLOGY | Facility: CLINIC | Age: 36
End: 2021-11-26
Payer: MEDICAID

## 2021-11-26 NOTE — PROGRESS NOTES
RECORDS STATUS - BREAST    RECORDS REQUESTED FROM: EPIC   DATE REQUESTED: 11/29/2021   NOTES DETAILS STATUS   OFFICE NOTE from referring provider     OFFICE NOTE from medical oncologist Complete  11/9/2021-Malignant neoplasm of upper-inner quadrant of right breast in female, estrogen receptor positive (H)     More In EPIC   OFFICE NOTE from surgeon Complete 11/15/2021 Breast Biopsy    OFFICE NOTE from radiation oncologist     DISCHARGE SUMMARY from hospital Complete  11/15/2021 Malignant neoplasm of upper-inner quadrant of right breast in female, estrogen receptor positive (H)      DISCHARGE REPORT from the ER Complete 4/1/2021 Breast Mass   OPERATIVE REPORT Complete 11/15/2021 Breast Biopsy    MEDICATION LIST Complete [facility]   CLINICAL TRIAL TREATMENTS TO DATE     LABS     REQUEST BLOCKS FOR ALL BREAST CANCER PTS     PATHOLOGY REPORTS  (Tissue diagnosis, Stage, ER/SD percentage positive and intensity of staining, HER2 IHC, FISH, and all biopsies from breast and any distant metastasis)                 Complete 11/15/2021   A. BREAST, RIGHT, WIRE LOCALIZATION AND LUMPECTOMY:     B. SENTINEL LYMPH NODES, RIGHT BREAST, SENTINEL LYMPH NODE BIOPSY:   1. NO EVIDENCE OF METASTATIC CARCINOMA ON H&E STAINED LEVELS (0 OF 2)    10/20/2021   A(1). Skin and soft tissue, anal lesion, excision:  -Skin with ulceration, underlying acute and chronic inflammation and granulation tissue, consistent with fistula tract.  -Negative for dysplasia or malignancy      GENONOMIC TESTING     TYPE:   (Next Generation Sequencing, including Foundation One testing, and Oncotype score)     IMAGING (NEED IMAGES & REPORT)     CT SCANS Complete CT Abdomen Pelvis 5/23/2021   MRI Complete MRI Liver 6/19/2021   MAMMO Complete 11/15/2021 more in PACS   ULTRASOUND Complete US Breast Biopsy 4/16/2021 more in PACS   PET     Xray Chest Complete 5/23/2021   BONE SCAN     BRAIN MRI       Action    Action Taken 11/26/2021 7:29AM [facility]     I pulled HE imaging into  PACS

## 2021-11-29 ENCOUNTER — PRE VISIT (OUTPATIENT)
Dept: RADIATION ONCOLOGY | Facility: CLINIC | Age: 36
End: 2021-11-29

## 2021-11-29 ENCOUNTER — OFFICE VISIT (OUTPATIENT)
Dept: RADIATION ONCOLOGY | Facility: CLINIC | Age: 36
End: 2021-11-29
Attending: SPECIALIST
Payer: MEDICAID

## 2021-11-29 VITALS
SYSTOLIC BLOOD PRESSURE: 112 MMHG | OXYGEN SATURATION: 100 % | TEMPERATURE: 97.9 F | DIASTOLIC BLOOD PRESSURE: 66 MMHG | HEART RATE: 73 BPM | RESPIRATION RATE: 16 BRPM

## 2021-11-29 DIAGNOSIS — C50.611 MALIGNANT NEOPLASM OF AXILLARY TAIL OF RIGHT BREAST IN FEMALE, ESTROGEN RECEPTOR POSITIVE (H): ICD-10-CM

## 2021-11-29 DIAGNOSIS — Z17.0 MALIGNANT NEOPLASM OF AXILLARY TAIL OF RIGHT BREAST IN FEMALE, ESTROGEN RECEPTOR POSITIVE (H): ICD-10-CM

## 2021-11-29 PROCEDURE — G0463 HOSPITAL OUTPT CLINIC VISIT: HCPCS

## 2021-11-29 PROCEDURE — 99205 OFFICE O/P NEW HI 60 MIN: CPT | Performed by: RADIOLOGY

## 2021-11-29 NOTE — PROGRESS NOTES
[facility] Radiation Oncology Consult Note      Patient: Joanne Colon  MRN: [id number]  Date of Service: 11/29/2021    Assessment:       ICD-10-CM    1. Malignant neoplasm of axillary tail of right breast in female, estrogen receptor positive (H)  C50.611 Adult Oncology/Hematology Referral    Z17.0         Impression/Plan:   36 year old female S/P TCHP for triple positive right breast IDC. On US measured 2.2cm with single node measuring 1.7 cm. Pathology IDC grade III, axillary LN positive for disease. Post-chemo lumpectomy with no evidence for in situ or invasive disease, 0/2 SLN pos. Heriditary breast and ocarian cancer gene panels, negative for 37 genes analyzed.     1. This is a 36 year old Lithuanian speaking female who was originally diagnosed with IDC grade III in the right breast. There was a single lymph node seen on imaging which was also biopsied and returned positive. She was able to complete 6 cycles of TCHP. No evidence of in situ or invasive disease on post chemo lumpectomy. Recommending 4240 cGy over 16 fractions to right breast and lymph nodes.     2.  Three common misconceptions of radiation were addressed:              1) there is no transfer of heat, so no burns in traditional sense. Skin erythema from irritation.              2) there is no radioactivity at anytime during or after completion of each treatment.Treatment is just energy passing through target.               3) generally radiation does not cause immunosuppression.     3. Common side effects to expect are fatigue and skin reaction which typically occur 1-2 weeks after start of radiation therapy.  Symptoms typically worsen throughout treatment, peak 1-2 weeks after completion of radiation, and then typically resolve over the next several weeks. We briefly discussed skin cares but will expand on this further in clinic, she understands to avoid anti-perspirants and hot/cold on the right breast while on treatment.The patient  voiced understanding of the information discussed and wishes to proceed forward with radiation therapy. She will present to [facility] to undergo CT simulation to begin radiation therapy planning.        4. UPT prior to CT simulation per hospital protocol with pre-menopausal women. Patient currently not sexually active.     5. Covid test prior to start of radiation therapy.     6. ROM exercises to prevent RUE/shoulder issues, demonstrated in clinic and instructed to perform for next 6 months.     The patient declined formal Bengali interpretation services. She elected to have sister interpret. Their questions were answered to full satisfaction.      Intent of Therapy: Curative  Patient on concurrent Herceptin Yes  Adjuvant hormonal therapy: Yes Agent: TBD  Start: Following radiation  Chemotherapy: S/P TCHP x 6 cycle, concurrent pertuzumab + trastuzumab  Intended fractionation schedule:  Hypofractionation no boost    Breast cancer risk factors:   No obstetric history on file.  LMP Dates from Last 4 Encounters:   LMP: 05/15/2021   LMP: 05/10/2021       We recommend adjuvant radiation as part of her breast conserving therapy to decrease her chance of local recurrence to the single digits. ROM stretches and skin care were discussed with the patient. She understands that these maneuvers need to continue for 6 months following completion of radiation as skin and muscle fibrosis continue to form for weeks to months following completion of therapy.      Side effects that may occur during or within weeks after radiation therapy      Fatigue and general weakness    Darkening, irritation, itchiness, redness, dryness, erythema, peeling, scabbing, ulceration and contraction of the skin of the breast and chest    Swelling of the breast    Loss of armpit hair    Lung irritation    Decrease in appetite    Side effects that may occur months or years after radiation therapy      Development of another tumor or  cancer    Thickening, telangiectasias (development of spider like blood vessels in the skin) and ulceration of the skin of the breast and chest    Firming, fibrosis (scar tissue), fat necrosis, and distortion of the breast    Poor healing after a trauma or surgery in the irradiated area    Nerve damage resulting in loss of arm strength and sensation    Lung inflammation of fibrosis causing cough, fever and shortness of breath    Swelling of an arm and hand    Decreased range of motion of the right upper extremity which may result in shoulder/rotator cuff injury.      The risks, benefits and alternatives to radiation therapy were outlined with the patient. All questions were answered and a consent was signed.    Subjective:   This patient was referred to myself by Dr. Perdomo for consideration of radiation therapy.    HPI:  Joanne Colon is a 36 year old female S/P TCHP for triple positive right breast IDC.    The patient presented to the ED on 4/1/2021 for a painful mass in her right breast and an US showed a 2.2 cm irregularly shaped hypoechoic mass at the 9:00 position as well as a 21.7 cm axillary LN. A right axillary LN and right breast biopsy was performed on 4/13/2021,  pathology of the right breast returned positive for IDC grade III, ER/IA pos, HER-2 positive. Pathology of the axilla returned positive for metastatic carcinoma compatible with breast primary.    She was initiated on TCHP chemotherapy on 5/13/2021 and completed 6th cycle on 9/2/2021. A right breast lumpectomy was performed on 11/15/2021 by Dr. Perdomo, final pathology returned with no evidence for residual in situ or invasive carcinoma, 0/2 SLN pos. Genetic panel negative.      The patient presents today to discuss radiation therapy. She has no complaints and is doing well following surgery.     Past Medical History:   Diagnosis Date     Breast cancer (H)      Breast mass, right 4/9/2021    Dx April 2021      Chemotherapy induced nausea and  vomiting 2021     Chemotherapy-induced neutropenia (H)      Colitis, acute      Encounter for antineoplastic chemotherapy 2021     Fever and chills 2021     Functional diarrhea 2021     Hepatitis      Lesion of liver less than 1 cm in diameter      Malignant neoplasm of nipple of right breast in female, estrogen receptor positive (H) 2021     Mucositis due to chemotherapy      Neutropenic fever (H)      Neutropenic sepsis (H) 2021     Past Surgical History:   Procedure Laterality Date     BIOPSY NODE SENTINEL Right 11/15/2021    Procedure: [place] Lymph Node Biopsy;  Surgeon: Delaney Perdomo MD;  Location: [facility] OR      SECTION      x two     EXAM UNDER ANESTHESIA ANUS N/A 10/20/2021    Procedure: EXAM UNDER ANESTHESIA,;  Surgeon: Jelly Zelaya MD;  Location: [facility] OR     FISTULOTOMY RECTUM N/A 10/20/2021    Procedure:   SETON placement ;  Surgeon: Jelly Zelaya MD;  Location:  OR     IR CHEST PORT PLACEMENT > 5 YRS OF AGE  2021     IR PORT PLACEMENT >5 YEARS  2021     LUMPECTOMY BREAST Right 11/15/2021    Procedure: Right lumpectomy after Wire Localization,;  Surgeon: Delaney Perdomo MD;  Location: [facility] OR     US BIOPSY FINE NEEDLE ASPIRATION LYMPH NODE (BREAST) RIGHT Right 2021     US BREAST CORE BIOPSY RIGHT Right 2021     No current outpatient medications on file.     Current Facility-Administered Medications   Medication     lidocaine (XYLOCAINE) 2 % external gel     Patient has no known allergies.  Social History     Socioeconomic History     Marital status:      Spouse name: Not on file     Number of children: 2     Years of education: Not on file     Highest education level: Not on file   Occupational History     Not on file   Tobacco Use     Smoking status: Never Smoker     Smokeless tobacco: Never Used   Vaping Use     Vaping Use: Never used   Substance and Sexual Activity     Alcohol use: Not Currently      Drug use: Not Currently     Sexual activity: Not Currently   Other Topics Concern     Parent/sibling w/ CABG, MI or angioplasty before 65F 55M? Not Asked   Social History Narrative    Diet-    Exercise-    [place]     Social Determinants of Health     Financial Resource Strain: Not on file   Food Insecurity: Not on file   Transportation Needs: Not on file   Physical Activity: Not on file   Stress: Not on file   Social Connections: Not on file   Intimate Partner Violence: Not on file   Housing Stability: Not on file     . Never smoker. Denies alcohol consumption.     Family History   Problem Relation Age of Onset     Diabetes Type 2  Mother      Diabetes Type 2  Father      Breast Cancer No family hx of      Family history reviewed, non contributory. No history of breast cancer in family.     ROS:  Reviewed with Joanne Colon today.      General  Constitutional  Constitutional (WDL): All constitutional elements are within defined limits  EENT  Eye Disorders  Eye Disorder (WDL): All eye disorder elements are within defined limits  Ear Disorders  Ear Disorder (WDL): All ear disorder elements are within defined limits  Respiratory    Respiratory  Respiratory (WDL): All respiratory elements are within defined limits  Cardiovascular  Cardiovascular  Cardiovascular (WDL): All cardiovascular elements are within defined limits  Gastrointestinal  Gastrointestinal  Gastrointestinal (WDL): All gastrointestinal elements are within defined limits  Musculoskeletal  Musculoskeletal and Connective Tissue Disorders  Musculoskeletal & Connective (WDL): Exceptions to WDL  Myalgia: Mild pain (slightly in incision)  Integumentary              Integumentary  Integumentary (WDL): Exceptions to WDL (healing incision in right breast)  Neurological  Neurosensory  Neurosensory (WDL): All neurosensory elements are within defined limits  Genitourinary/Reproductive  Genitourinary  Genitourinary (WDL): All genitourinary elements are  within defined limits  Lymphatic   Lymph System Disorders  Lymph (WDL): All lymph elements are within defined limits  Patient Coping  Patient Coping: Accepting;Open/discussion  Pain   Pain Score: No Pain (0)   AUA Assessment                                                                         Accompanied by  Accompanied By: family (sister)        Objective:        Exam:    Vitals:    11/29/21 1100 11/29/21 1103   BP:  112/66   Pulse:  73   Resp:  16   Temp:  97.9  F (36.6  C)   TempSrc:  Oral   SpO2:  100%   PainSc: No Pain (0)      GENERAL: No acute distress. Cooperative in conversation. Mask on.   RESP: Normal respiratory effort.   Breasts: Right breast with expected post operative changes, no masses skin changes suggestive of recurrence or infection.   ABD: Soft, nontender.  NEURO: Non focal. Alert and oriented x3.   PSYCH: Within normal limits. No depression or anxiety.  SKIN: Warm dry intact.     Recent Labs: No results found for this or any previous visit (from the past 168 hour(s)).    Imaging: Imaging results 30 days: Image Guided Breast Loc w [place] Node Inj Right    Result Date: 11/15/2021  INDICATION: Pre-operative localization of invasive ductal carcinoma at 9 o'clock, 5 cm from the nipple. PROCEDURE: Informed consent was obtained from the patient. The breast was cleansed with ChloraPrep. Lidocaine was used for local anesthesia. A -gauge needle was then advanced to the area of abnormality. A localization wire was then deployed.  was injected subdermally along the upper outer aspect of the areolar margin. Post-procedure mammograms demonstrate the localization wire in appropriate position. The previously placed marker was visualized. The patient tolerated this well. IMPRESSION: Sonographically guided right wire localization. A specimen was not sent for radiography. [place] Lymph Node Injection:  530 uci lymphoseek Right upper outer major-areolar region of right breast Bernard Negro       Pathology:    A. BREAST, RIGHT, WIRE LOCALIZATION AND LUMPECTOMY:  1. RESIDUAL IN SITU AND INVASIVE CARCINOMA NOT IDENTIFIED  a. STAGE:  ypT0 (sn) pN0   2. 11 X 8 X 7 MM TUMOR BED WITH GRANULATION TISSUE AND HISTIOCYTIC INFILTRATION  3. BIOPSY CAVITY WITH FOREIGN BODY GRANULOMATOUS INFLAMMATION  4. TUMOR BED 1 MM FROM ANTERIOR MARGIN, 12 MM FROM POSTERIOR MARGIN, 7 MM FROM MEDIAL MARGIN, 3 MM FROM LATERAL MARGIN, 15 MM FROM SUPERIOR MARGIN AND 9 MM FROM INFERIOR MARGIN  5. PROLIFERATIVE FIBROCYSTIC CHANGES WITH FIBROSIS, DUCT ECTASIA AND MULTIFOCAL SCLEROSING ADENOSIS WITH ASSOCIATED MICROCALCIFICATIONS     B. SENTINEL LYMPH NODES, RIGHT BREAST, SENTINEL LYMPH NODE BIOPSY:   1. NO EVIDENCE OF METASTATIC CARCINOMA ON H&E STAINED LEVELS (0 OF 2)  2. BIOPSY SITE WITH FOREIGN BODY GRANULOMATOUS INFLAMMATION       60 minutes spent on the date of the encounter doing chart review, review of test results, interpretation of tests, patient visit, documentation, and discussion with family, interpretation, personal review of mammograms and pathology        I, Sanam Christensen MD personally performed the services described in this documentation, as scribed by Dallas Sheldon in my presence, and it is both accurate and complete.    Signed by: Sanam Christensen MD, MPH

## 2021-11-29 NOTE — PROGRESS NOTES
Patient here ambulatory accompanied by sister for radiation consult for her breast cancer.  Patient sister speaks and reads English and did interpreting today.  15 minutes spent in review of radiation process and potential side effects.  Written information given for review.  Seen by Dr. Christensen.  Plan return to clinic for follow up and/or CT simulation as directed by physician.    Radiation Therapy Patient Education    Person involved with teaching: Patient and Sister    Patient educational needs for self management of treatment-related side effects assessment completed.  EPIC Patient Ed tab contains Patient Learning Assessment    Education Materials Given  Radiation Therapy and You, Understanding Radiation Therapy, Radiation Therapy Team, Radiation Therapy Treatment, Radiation Therapy: Managing Short-Term Side Effects, Skin Care During Radiation Therapy, Radiation Therapy: Your Daily Life, Radiosurgery Pathway, Radiation Therapy to the Breast Guidelines, Welcome Letter, Insurance PA Information and Map [facility]    Educational Topics Discussed  Side effects expected and Skin care    Response To Teaching  More review necessary    GYN Only  Vaginal Dilator-given and educated: N/A    Referrals sent: None    Chemotherapy?  Yes: Perjeta and Herceptin with Dr. Sanchez.

## 2021-11-29 NOTE — LETTER
11/29/2021         RE: Joanne Colon  [address] [place] [address]        Dear Colleague,    Thank you for referring your patient, Joanne Colon, to the [facility] [place]. Please see a copy of my visit note below.          [facility] Radiation Oncology Consult Note      Patient: Joanne Colon  MRN: [id number]  Date of Service: 11/29/2021    Assessment:       ICD-10-CM    1. Malignant neoplasm of axillary tail of right breast in female, estrogen receptor positive (H)  C50.611 Adult Oncology/Hematology Referral    Z17.0         Impression/Plan:   36 year old female S/P TCHP for triple positive right breast IDC. On US measured 2.2cm with single node measuring 1.7 cm. Pathology IDC grade III, axillary LN positive for disease. Post-chemo lumpectomy with no evidence for in situ or invasive disease, 0/2 SLN pos. Heriditary breast and ocarian cancer gene panels, negative for 37 genes analyzed.     1. This is a 36 year old Serbian speaking female who was originally diagnosed with IDC grade III in the right breast. There was a single lymph node seen on imaging which was also biopsied and returned positive. She was able to complete 6 cycles of TCHP. No evidence of in situ or invasive disease on post chemo lumpectomy. Recommending 4240 cGy over 16 fractions to right breast and lymph nodes.     2.  Three common misconceptions of radiation were addressed:              1) there is no transfer of heat, so no burns in traditional sense. Skin erythema from irritation.              2) there is no radioactivity at anytime during or after completion of each treatment.Treatment is just energy passing through target.               3) generally radiation does not cause immunosuppression.     3. Common side effects to expect are fatigue and skin reaction which typically occur 1-2 weeks after start of radiation therapy.  Symptoms typically worsen throughout treatment, peak 1-2 weeks after  completion of radiation, and then typically resolve over the next several weeks. We briefly discussed skin cares but will expand on this further in clinic, she understands to avoid anti-perspirants and hot/cold on the right breast while on treatment.The patient voiced understanding of the information discussed and wishes to proceed forward with radiation therapy. She will present to [facility] to undergo CT simulation to begin radiation therapy planning.        4. UPT prior to CT simulation per hospital protocol with pre-menopausal women. Patient currently not sexually active.     5. Covid test prior to start of radiation therapy.     6. ROM exercises to prevent RUE/shoulder issues, demonstrated in clinic and instructed to perform for next 6 months.     The patient declined formal [place] interpretation services. She elected to have sister interpret. Their questions were answered to full satisfaction.      Intent of Therapy: Curative  Patient on concurrent Herceptin Yes  Adjuvant hormonal therapy: Yes Agent: TBD  Start: Following radiation  Chemotherapy: S/P TCHP x 6 cycle, concurrent pertuzumab + trastuzumab  Intended fractionation schedule:  Hypofractionation no boost    Breast cancer risk factors:   No obstetric history on file.  LMP Dates from Last 4 Encounters:   LMP: 05/15/2021   LMP: 05/10/2021       We recommend adjuvant radiation as part of her breast conserving therapy to decrease her chance of local recurrence to the single digits. ROM stretches and skin care were discussed with the patient. She understands that these maneuvers need to continue for 6 months following completion of radiation as skin and muscle fibrosis continue to form for weeks to months following completion of therapy.      Side effects that may occur during or within weeks after radiation therapy      Fatigue and general weakness    Darkening, irritation, itchiness, redness, dryness, erythema, peeling, scabbing, ulceration and  contraction of the skin of the breast and chest    Swelling of the breast    Loss of armpit hair    Lung irritation    Decrease in appetite    Side effects that may occur months or years after radiation therapy      Development of another tumor or cancer    Thickening, telangiectasias (development of spider like blood vessels in the skin) and ulceration of the skin of the breast and chest    Firming, fibrosis (scar tissue), fat necrosis, and distortion of the breast    Poor healing after a trauma or surgery in the irradiated area    Nerve damage resulting in loss of arm strength and sensation    Lung inflammation of fibrosis causing cough, fever and shortness of breath    Swelling of an arm and hand    Decreased range of motion of the right upper extremity which may result in shoulder/rotator cuff injury.      The risks, benefits and alternatives to radiation therapy were outlined with the patient. All questions were answered and a consent was signed.    Subjective:   This patient was referred to myself by Dr. Perdomo for consideration of radiation therapy.    HPI:  Joanne Colon is a 36 year old female S/P TCHP for triple positive right breast IDC.    The patient presented to the ED on 4/1/2021 for a painful mass in her right breast and an US showed a 2.2 cm irregularly shaped hypoechoic mass at the 9:00 position as well as a 21.7 cm axillary LN. A right axillary LN and right breast biopsy was performed on 4/13/2021,  pathology of the right breast returned positive for IDC grade III, ER/CA pos, HER-2 positive. Pathology of the axilla returned positive for metastatic carcinoma compatible with breast primary.    She was initiated on TCHP chemotherapy on 5/13/2021 and completed 6th cycle on 9/2/2021. A right breast lumpectomy was performed on 11/15/2021 by Dr. Perdomo, final pathology returned with no evidence for residual in situ or invasive carcinoma, 0/2 SLN pos. Genetic panel negative.      The patient presents today  to discuss radiation therapy. She has no complaints and is doing well following surgery.     Past Medical History:   Diagnosis Date     Breast cancer (H)      Breast mass, right 2021    Dx 2021      Chemotherapy induced nausea and vomiting 2021     Chemotherapy-induced neutropenia (H)      Colitis, acute      Encounter for antineoplastic chemotherapy 2021     Fever and chills 2021     Functional diarrhea 2021     Hepatitis      Lesion of liver less than 1 cm in diameter      Malignant neoplasm of nipple of right breast in female, estrogen receptor positive (H) 2021     Mucositis due to chemotherapy      Neutropenic fever (H)      Neutropenic sepsis (H) 2021     Past Surgical History:   Procedure Laterality Date     BIOPSY NODE SENTINEL Right 11/15/2021    Procedure: [place] Lymph Node Biopsy;  Surgeon: Delaney Perdomo MD;  Location: [facility] OR      SECTION      x two     EXAM UNDER ANESTHESIA ANUS N/A 10/20/2021    Procedure: EXAM UNDER ANESTHESIA,;  Surgeon: Jelly Zelaya MD;  Location: [facility] OR     FISTULOTOMY RECTUM N/A 10/20/2021    Procedure:   SETON placement ;  Surgeon: Jelly Zelaya MD;  Location: [facility] OR     IR CHEST PORT PLACEMENT > 5 YRS OF AGE  2021     IR PORT PLACEMENT >5 YEARS  2021     LUMPECTOMY BREAST Right 11/15/2021    Procedure: Right lumpectomy after Wire Localization,;  Surgeon: Delaney Perdomo MD;  Location: [facility] OR     US BIOPSY FINE NEEDLE ASPIRATION LYMPH NODE (BREAST) RIGHT Right 2021     US BREAST CORE BIOPSY RIGHT Right 2021     No current outpatient medications on file.     Current Facility-Administered Medications   Medication     lidocaine (XYLOCAINE) 2 % external gel     Patient has no known allergies.  Social History     Socioeconomic History     Marital status:      Spouse name: Not on file     Number of children: 2     Years of education: Not on file     Highest education level:  Not on file   Occupational History     Not on file   Tobacco Use     Smoking status: Never Smoker     Smokeless tobacco: Never Used   Vaping Use     Vaping Use: Never used   Substance and Sexual Activity     Alcohol use: Not Currently     Drug use: Not Currently     Sexual activity: Not Currently   Other Topics Concern     Parent/sibling w/ CABG, MI or angioplasty before 65F 55M? Not Asked   Social History Narrative    Diet-    Exercise-    [place]     Social Determinants of Health     Financial Resource Strain: Not on file   Food Insecurity: Not on file   Transportation Needs: Not on file   Physical Activity: Not on file   Stress: Not on file   Social Connections: Not on file   Intimate Partner Violence: Not on file   Housing Stability: Not on file     . Never smoker. Denies alcohol consumption.     Family History   Problem Relation Age of Onset     Diabetes Type 2  Mother      Diabetes Type 2  Father      Breast Cancer No family hx of      Family history reviewed, non contributory. No history of breast cancer in family.     ROS:  Reviewed with Joanne Colon today.      General  Constitutional  Constitutional (WDL): All constitutional elements are within defined limits  EENT  Eye Disorders  Eye Disorder (WDL): All eye disorder elements are within defined limits  Ear Disorders  Ear Disorder (WDL): All ear disorder elements are within defined limits  Respiratory    Respiratory  Respiratory (WDL): All respiratory elements are within defined limits  Cardiovascular  Cardiovascular  Cardiovascular (WDL): All cardiovascular elements are within defined limits  Gastrointestinal  Gastrointestinal  Gastrointestinal (WDL): All gastrointestinal elements are within defined limits  Musculoskeletal  Musculoskeletal and Connective Tissue Disorders  Musculoskeletal & Connective (WDL): Exceptions to WDL  Myalgia: Mild pain (slightly in incision)  Integumentary              Integumentary  Integumentary (WDL): Exceptions to  WDL (healing incision in right breast)  Neurological  Neurosensory  Neurosensory (WDL): All neurosensory elements are within defined limits  Genitourinary/Reproductive  Genitourinary  Genitourinary (WDL): All genitourinary elements are within defined limits  Lymphatic   Lymph System Disorders  Lymph (WDL): All lymph elements are within defined limits  Patient Coping  Patient Coping: Accepting;Open/discussion  Pain   Pain Score: No Pain (0)   AUA Assessment                                                                         Accompanied by  Accompanied By: family (sister)        Objective:        Exam:    Vitals:    11/29/21 1100 11/29/21 1103   BP:  112/66   Pulse:  73   Resp:  16   Temp:  97.9  F (36.6  C)   TempSrc:  Oral   SpO2:  100%   PainSc: No Pain (0)      GENERAL: No acute distress. Cooperative in conversation. Mask on.   RESP: Normal respiratory effort.   Breasts: Right breast with expected post operative changes, no masses skin changes suggestive of recurrence or infection.   ABD: Soft, nontender.  NEURO: Non focal. Alert and oriented x3.   PSYCH: Within normal limits. No depression or anxiety.  SKIN: Warm dry intact.     Recent Labs: No results found for this or any previous visit (from the past 168 hour(s)).    Imaging: Imaging results 30 days: Image Guided Breast Loc w [place] Node Inj Right    Result Date: 11/15/2021  INDICATION: Pre-operative localization of invasive ductal carcinoma at 9 o'clock, 5 cm from the nipple. PROCEDURE: Informed consent was obtained from the patient. The breast was cleansed with ChloraPrep. Lidocaine was used for local anesthesia. A -gauge needle was then advanced to the area of abnormality. A localization wire was then deployed.  was injected subdermally along the upper outer aspect of the areolar margin. Post-procedure mammograms demonstrate the localization wire in appropriate position. The previously placed marker was visualized. The patient tolerated this well.  IMPRESSION: Sonographically guided right wire localization. A specimen was not sent for radiography. [place] Lymph Node Injection:  530 uci lymphoseek Right upper outer major-areolar region of right breast Bernard Negro       Pathology:   A. BREAST, RIGHT, WIRE LOCALIZATION AND LUMPECTOMY:  1. RESIDUAL IN SITU AND INVASIVE CARCINOMA NOT IDENTIFIED  a. STAGE:  ypT0 (sn) pN0   2. 11 X 8 X 7 MM TUMOR BED WITH GRANULATION TISSUE AND HISTIOCYTIC INFILTRATION  3. BIOPSY CAVITY WITH FOREIGN BODY GRANULOMATOUS INFLAMMATION  4. TUMOR BED 1 MM FROM ANTERIOR MARGIN, 12 MM FROM POSTERIOR MARGIN, 7 MM FROM MEDIAL MARGIN, 3 MM FROM LATERAL MARGIN, 15 MM FROM SUPERIOR MARGIN AND 9 MM FROM INFERIOR MARGIN  5. PROLIFERATIVE FIBROCYSTIC CHANGES WITH FIBROSIS, DUCT ECTASIA AND MULTIFOCAL SCLEROSING ADENOSIS WITH ASSOCIATED MICROCALCIFICATIONS     B. SENTINEL LYMPH NODES, RIGHT BREAST, SENTINEL LYMPH NODE BIOPSY:   1. NO EVIDENCE OF METASTATIC CARCINOMA ON H&E STAINED LEVELS (0 OF 2)  2. BIOPSY SITE WITH FOREIGN BODY GRANULOMATOUS INFLAMMATION       60 minutes spent on the date of the encounter doing chart review, review of test results, interpretation of tests, patient visit, documentation, and discussion with family, interpretation, personal review of mammograms and pathology        I, Sanam Christensen MD personally performed the services described in this documentation, as scribed by Dallas Sheldon in my presence, and it is both accurate and complete.    Signed by: Sanam Christensen MD, MPH           Patient here ambulatory accompanied by sister for radiation consult for her breast cancer.  Patient sister speaks and reads English and did interpreting today.  15 minutes spent in review of radiation process and potential side effects.  Written information given for review.  Seen by Dr. Christensen.  Plan return to clinic for follow up and/or CT simulation as directed by physician.    Radiation Therapy Patient Education    Person  involved with teaching: Patient and Sister    Patient educational needs for self management of treatment-related side effects assessment completed.  [facility] Patient Ed tab contains Patient Learning Assessment    Education Materials Given  Radiation Therapy and You, Understanding Radiation Therapy, Radiation Therapy Team, Radiation Therapy Treatment, Radiation Therapy: Managing Short-Term Side Effects, Skin Care During Radiation Therapy, Radiation Therapy: Your Daily Life, Radiosurgery Pathway, Radiation Therapy to the Breast Guidelines, Welcome Letter, Insurance PA Information and Map [employer]    Educational Topics Discussed  Side effects expected and Skin care    Response To Teaching  More review necessary    GYN Only  Vaginal Dilator-given and educated: N/A    Referrals sent: None    Chemotherapy?  Yes: Perjeta and Herceptin with Dr. Sanchez.          Again, thank you for allowing me to participate in the care of your patient.        Sincerely,        Sanam Christensen MD

## 2021-11-30 ENCOUNTER — INFUSION THERAPY VISIT (OUTPATIENT)
Dept: INFUSION THERAPY | Facility: CLINIC | Age: 36
End: 2021-11-30
Attending: INTERNAL MEDICINE
Payer: MEDICAID

## 2021-11-30 ENCOUNTER — ONCOLOGY VISIT (OUTPATIENT)
Dept: ONCOLOGY | Facility: CLINIC | Age: 36
End: 2021-11-30
Attending: INTERNAL MEDICINE
Payer: MEDICAID

## 2021-11-30 VITALS
WEIGHT: 127.1 LBS | SYSTOLIC BLOOD PRESSURE: 102 MMHG | OXYGEN SATURATION: 99 % | BODY MASS INDEX: 24.95 KG/M2 | HEIGHT: 60 IN | RESPIRATION RATE: 16 BRPM | DIASTOLIC BLOOD PRESSURE: 67 MMHG | TEMPERATURE: 98.3 F | HEART RATE: 79 BPM

## 2021-11-30 DIAGNOSIS — Z79.899 ENCOUNTER FOR MONITORING CARDIOTOXIC DRUG THERAPY: Primary | ICD-10-CM

## 2021-11-30 DIAGNOSIS — Z51.81 ENCOUNTER FOR MONITORING CARDIOTOXIC DRUG THERAPY: Primary | ICD-10-CM

## 2021-11-30 DIAGNOSIS — C50.211 MALIGNANT NEOPLASM OF UPPER-INNER QUADRANT OF RIGHT BREAST IN FEMALE, ESTROGEN RECEPTOR POSITIVE (H): ICD-10-CM

## 2021-11-30 DIAGNOSIS — Z17.0 MALIGNANT NEOPLASM OF UPPER-INNER QUADRANT OF RIGHT BREAST IN FEMALE, ESTROGEN RECEPTOR POSITIVE (H): ICD-10-CM

## 2021-11-30 DIAGNOSIS — C50.011 MALIGNANT NEOPLASM OF NIPPLE OF RIGHT BREAST IN FEMALE, ESTROGEN RECEPTOR POSITIVE (H): Primary | ICD-10-CM

## 2021-11-30 DIAGNOSIS — Z17.0 MALIGNANT NEOPLASM OF NIPPLE OF RIGHT BREAST IN FEMALE, ESTROGEN RECEPTOR POSITIVE (H): Primary | ICD-10-CM

## 2021-11-30 DIAGNOSIS — Z51.11 ENCOUNTER FOR ANTINEOPLASTIC CHEMOTHERAPY: ICD-10-CM

## 2021-11-30 LAB
ALBUMIN SERPL-MCNC: 4 G/DL (ref 3.5–5)
ALP SERPL-CCNC: 72 U/L (ref 45–120)
ALT SERPL W P-5'-P-CCNC: 11 U/L (ref 0–45)
ANION GAP SERPL CALCULATED.3IONS-SCNC: 11 MMOL/L (ref 5–18)
AST SERPL W P-5'-P-CCNC: 15 U/L (ref 0–40)
BASOPHILS # BLD AUTO: 0.1 10E3/UL (ref 0–0.2)
BASOPHILS NFR BLD AUTO: 1 %
BILIRUB SERPL-MCNC: 0.3 MG/DL (ref 0–1)
BUN SERPL-MCNC: 13 MG/DL (ref 8–22)
CALCIUM SERPL-MCNC: 9.5 MG/DL (ref 8.5–10.5)
CHLORIDE BLD-SCNC: 105 MMOL/L (ref 98–107)
CO2 SERPL-SCNC: 21 MMOL/L (ref 22–31)
CREAT SERPL-MCNC: 0.68 MG/DL (ref 0.6–1.1)
EOSINOPHIL # BLD AUTO: 0.1 10E3/UL (ref 0–0.7)
EOSINOPHIL NFR BLD AUTO: 2 %
ERYTHROCYTE [DISTWIDTH] IN BLOOD BY AUTOMATED COUNT: 12.3 % (ref 10–15)
GFR SERPL CREATININE-BSD FRML MDRD: >90 ML/MIN/1.73M2
GLUCOSE BLD-MCNC: 98 MG/DL (ref 70–125)
HCT VFR BLD AUTO: 38.5 % (ref 35–47)
HGB BLD-MCNC: 12.5 G/DL (ref 11.7–15.7)
HOLD SPECIMEN: NORMAL
IMM GRANULOCYTES # BLD: 0 10E3/UL
IMM GRANULOCYTES NFR BLD: 0 %
LYMPHOCYTES # BLD AUTO: 2.3 10E3/UL (ref 0.8–5.3)
LYMPHOCYTES NFR BLD AUTO: 37 %
MCH RBC QN AUTO: 29.3 PG (ref 26.5–33)
MCHC RBC AUTO-ENTMCNC: 32.5 G/DL (ref 31.5–36.5)
MCV RBC AUTO: 90 FL (ref 78–100)
MONOCYTES # BLD AUTO: 0.4 10E3/UL (ref 0–1.3)
MONOCYTES NFR BLD AUTO: 6 %
NEUTROPHILS # BLD AUTO: 3.4 10E3/UL (ref 1.6–8.3)
NEUTROPHILS NFR BLD AUTO: 54 %
NRBC # BLD AUTO: 0 10E3/UL
NRBC BLD AUTO-RTO: 0 /100
PLATELET # BLD AUTO: 273 10E3/UL (ref 150–450)
POTASSIUM BLD-SCNC: 4 MMOL/L (ref 3.5–5)
PROT SERPL-MCNC: 7.5 G/DL (ref 6–8)
RBC # BLD AUTO: 4.27 10E6/UL (ref 3.8–5.2)
SODIUM SERPL-SCNC: 137 MMOL/L (ref 136–145)
WBC # BLD AUTO: 6.2 10E3/UL (ref 4–11)

## 2021-11-30 PROCEDURE — 85025 COMPLETE CBC W/AUTO DIFF WBC: CPT

## 2021-11-30 PROCEDURE — 96413 CHEMO IV INFUSION 1 HR: CPT

## 2021-11-30 PROCEDURE — 99215 OFFICE O/P EST HI 40 MIN: CPT | Performed by: INTERNAL MEDICINE

## 2021-11-30 PROCEDURE — 80053 COMPREHEN METABOLIC PANEL: CPT

## 2021-11-30 PROCEDURE — 96417 CHEMO IV INFUS EACH ADDL SEQ: CPT

## 2021-11-30 PROCEDURE — 250N000011 HC RX IP 250 OP 636: Performed by: INTERNAL MEDICINE

## 2021-11-30 PROCEDURE — 36591 DRAW BLOOD OFF VENOUS DEVICE: CPT

## 2021-11-30 PROCEDURE — G0463 HOSPITAL OUTPT CLINIC VISIT: HCPCS | Mod: 25

## 2021-11-30 PROCEDURE — 258N000003 HC RX IP 258 OP 636: Performed by: INTERNAL MEDICINE

## 2021-11-30 RX ORDER — LORAZEPAM 2 MG/ML
0.5 INJECTION INTRAMUSCULAR EVERY 4 HOURS PRN
Status: DISCONTINUED | OUTPATIENT
Start: 2021-11-30 | End: 2021-11-30 | Stop reason: HOSPADM

## 2021-11-30 RX ORDER — LORAZEPAM 2 MG/ML
0.5 INJECTION INTRAMUSCULAR EVERY 4 HOURS PRN
Status: CANCELLED
Start: 2021-11-30

## 2021-11-30 RX ORDER — MEPERIDINE HYDROCHLORIDE 50 MG/ML
25 INJECTION INTRAMUSCULAR; INTRAVENOUS; SUBCUTANEOUS EVERY 30 MIN PRN
Status: CANCELLED | OUTPATIENT
Start: 2021-11-30

## 2021-11-30 RX ORDER — METHYLPREDNISOLONE SODIUM SUCCINATE 125 MG/2ML
125 INJECTION, POWDER, LYOPHILIZED, FOR SOLUTION INTRAMUSCULAR; INTRAVENOUS
Status: CANCELLED
Start: 2021-11-30

## 2021-11-30 RX ORDER — NALOXONE HYDROCHLORIDE 0.4 MG/ML
0.2 INJECTION, SOLUTION INTRAMUSCULAR; INTRAVENOUS; SUBCUTANEOUS
Status: DISCONTINUED | OUTPATIENT
Start: 2021-11-30 | End: 2021-11-30 | Stop reason: HOSPADM

## 2021-11-30 RX ORDER — METHYLPREDNISOLONE SODIUM SUCCINATE 125 MG/2ML
125 INJECTION, POWDER, LYOPHILIZED, FOR SOLUTION INTRAMUSCULAR; INTRAVENOUS
Status: DISCONTINUED | OUTPATIENT
Start: 2021-11-30 | End: 2021-11-30 | Stop reason: HOSPADM

## 2021-11-30 RX ORDER — HEPARIN SODIUM,PORCINE 10 UNIT/ML
5 VIAL (ML) INTRAVENOUS
Status: CANCELLED | OUTPATIENT
Start: 2021-11-30

## 2021-11-30 RX ORDER — ALBUTEROL SULFATE 90 UG/1
1-2 AEROSOL, METERED RESPIRATORY (INHALATION)
Status: DISCONTINUED | OUTPATIENT
Start: 2021-11-30 | End: 2021-11-30 | Stop reason: HOSPADM

## 2021-11-30 RX ORDER — HEPARIN SODIUM (PORCINE) LOCK FLUSH IV SOLN 100 UNIT/ML 100 UNIT/ML
5 SOLUTION INTRAVENOUS
Status: CANCELLED | OUTPATIENT
Start: 2021-11-30

## 2021-11-30 RX ORDER — MEPERIDINE HYDROCHLORIDE 50 MG/ML
25 INJECTION INTRAMUSCULAR; INTRAVENOUS; SUBCUTANEOUS EVERY 30 MIN PRN
Status: DISCONTINUED | OUTPATIENT
Start: 2021-11-30 | End: 2021-11-30 | Stop reason: HOSPADM

## 2021-11-30 RX ORDER — DIPHENHYDRAMINE HCL 50 MG
50 CAPSULE ORAL
Status: CANCELLED | OUTPATIENT
Start: 2021-11-30

## 2021-11-30 RX ORDER — NALOXONE HYDROCHLORIDE 0.4 MG/ML
0.2 INJECTION, SOLUTION INTRAMUSCULAR; INTRAVENOUS; SUBCUTANEOUS
Status: CANCELLED | OUTPATIENT
Start: 2021-11-30

## 2021-11-30 RX ORDER — EPINEPHRINE 1 MG/ML
0.3 INJECTION, SOLUTION INTRAMUSCULAR; SUBCUTANEOUS EVERY 5 MIN PRN
Status: CANCELLED | OUTPATIENT
Start: 2021-11-30

## 2021-11-30 RX ORDER — ACETAMINOPHEN 325 MG/1
650 TABLET ORAL
Status: CANCELLED
Start: 2021-11-30

## 2021-11-30 RX ORDER — DIPHENHYDRAMINE HCL 50 MG
50 CAPSULE ORAL
Status: DISCONTINUED | OUTPATIENT
Start: 2021-11-30 | End: 2021-11-30 | Stop reason: HOSPADM

## 2021-11-30 RX ORDER — HEPARIN SODIUM (PORCINE) LOCK FLUSH IV SOLN 100 UNIT/ML 100 UNIT/ML
5 SOLUTION INTRAVENOUS
Status: DISCONTINUED | OUTPATIENT
Start: 2021-11-30 | End: 2021-11-30 | Stop reason: HOSPADM

## 2021-11-30 RX ORDER — DIPHENHYDRAMINE HYDROCHLORIDE 50 MG/ML
50 INJECTION INTRAMUSCULAR; INTRAVENOUS
Status: CANCELLED
Start: 2021-11-30

## 2021-11-30 RX ORDER — DIPHENHYDRAMINE HYDROCHLORIDE 50 MG/ML
50 INJECTION INTRAMUSCULAR; INTRAVENOUS
Status: DISCONTINUED | OUTPATIENT
Start: 2021-11-30 | End: 2021-11-30 | Stop reason: HOSPADM

## 2021-11-30 RX ORDER — ACETAMINOPHEN 325 MG/1
650 TABLET ORAL
Status: DISCONTINUED | OUTPATIENT
Start: 2021-11-30 | End: 2021-11-30 | Stop reason: HOSPADM

## 2021-11-30 RX ORDER — ALBUTEROL SULFATE 0.83 MG/ML
2.5 SOLUTION RESPIRATORY (INHALATION)
Status: DISCONTINUED | OUTPATIENT
Start: 2021-11-30 | End: 2021-11-30 | Stop reason: HOSPADM

## 2021-11-30 RX ORDER — EPINEPHRINE 1 MG/ML
0.3 INJECTION, SOLUTION INTRAMUSCULAR; SUBCUTANEOUS EVERY 5 MIN PRN
Status: DISCONTINUED | OUTPATIENT
Start: 2021-11-30 | End: 2021-11-30 | Stop reason: HOSPADM

## 2021-11-30 RX ORDER — ALBUTEROL SULFATE 90 UG/1
1-2 AEROSOL, METERED RESPIRATORY (INHALATION)
Status: CANCELLED
Start: 2021-11-30

## 2021-11-30 RX ORDER — ALBUTEROL SULFATE 0.83 MG/ML
2.5 SOLUTION RESPIRATORY (INHALATION)
Status: CANCELLED | OUTPATIENT
Start: 2021-11-30

## 2021-11-30 RX ADMIN — HEPARIN SODIUM (PORCINE) LOCK FLUSH IV SOLN 100 UNIT/ML 5 ML: 100 SOLUTION at 14:10

## 2021-11-30 RX ADMIN — TRASTUZUMAB 348 MG: 150 INJECTION, POWDER, LYOPHILIZED, FOR SOLUTION INTRAVENOUS at 13:26

## 2021-11-30 RX ADMIN — SODIUM CHLORIDE 250 ML: 9 INJECTION, SOLUTION INTRAVENOUS at 12:23

## 2021-11-30 RX ADMIN — PERTUZUMAB 420 MG: 30 INJECTION, SOLUTION, CONCENTRATE INTRAVENOUS at 12:43

## 2021-11-30 ASSESSMENT — MIFFLIN-ST. JEOR: SCORE: 1178.02

## 2021-11-30 ASSESSMENT — PAIN SCALES - GENERAL: PAINLEVEL: NO PAIN (0)

## 2021-11-30 NOTE — PROGRESS NOTES
Oncology Rooming Note    November 30, 2021 11:10 AM   Joanne Colon is a 36 year old female who presents for:    Chief Complaint   Patient presents with     Oncology Clinic Visit     Return Malignant neoplasm of axillary tail of right breast in female, estrogen receptor positive, labs / md / infusion today     Initial Vitals: /67 (BP Location: Left arm, Patient Position: Sitting, Cuff Size: Adult Regular)   Pulse 79   Temp 98.3  F (36.8  C) (Oral)   Resp 16   Ht 1.524 m (5')   Wt 57.7 kg (127 lb 1.6 oz)   SpO2 99%   BMI 24.82 kg/m   Estimated body mass index is 24.82 kg/m  as calculated from the following:    Height as of this encounter: 1.524 m (5').    Weight as of this encounter: 57.7 kg (127 lb 1.6 oz). Body surface area is 1.56 meters squared.  No Pain (0) Comment: Data Unavailable   No LMP recorded. (Menstrual status: Chemotherapy).  Allergies reviewed: Yes  Medications reviewed: Yes    Medications: Medication refills not needed today.  Pharmacy name entered into [place]: [id number] [address]  AT [facility]    Clinical concerns: Return Malignant neoplasm of axillary tail of right breast in female, estrogen receptor positive, labs / md / infusion today. Patient is here with her Sister Shadi. Reem will interpret today.       Gisele Sellers, CMA

## 2021-11-30 NOTE — LETTER
11/30/2021         RE: Joanne Colon  [address]  [facility] [address]        Dear Colleague,    Thank you for referring your patient, Joanne Colon, to the [facility] [place]. Please see a copy of my visit note below.    Oncology Rooming Note    November 30, 2021 11:10 AM   Joanne Colon is a 36 year old female who presents for:    Chief Complaint   Patient presents with     Oncology Clinic Visit     Return Malignant neoplasm of axillary tail of right breast in female, estrogen receptor positive, labs / md / infusion today     Initial Vitals: /67 (BP Location: Left arm, Patient Position: Sitting, Cuff Size: Adult Regular)   Pulse 79   Temp 98.3  F (36.8  C) (Oral)   Resp 16   Ht 1.524 m (5')   Wt 57.7 kg (127 lb 1.6 oz)   SpO2 99%   BMI 24.82 kg/m   Estimated body mass index is 24.82 kg/m  as calculated from the following:    Height as of this encounter: 1.524 m (5').    Weight as of this encounter: 57.7 kg (127 lb 1.6 oz). Body surface area is 1.56 meters squared.  No Pain (0) Comment: Data Unavailable   No LMP recorded. (Menstrual status: Chemotherapy).  Allergies reviewed: Yes  Medications reviewed: Yes    Medications: Medication refills not needed today.  Pharmacy [facility] entered into [facility]: [id number] [place], [place] - [address]  AT [facility]    Clinical concerns: Return Malignant neoplasm of axillary tail of right breast in female, estrogen receptor positive, labs / md / infusion today. Patient is here with her Sister Shadi. Shadi will interpret today.       Gisele Sellers [facility] Hematology and Oncology Progress Note    Patient: Joanne Colon  MRN: [id number]  Date of Service: Nov 30, 2021         Reason for Visit    Chief Complaint   Patient presents with     Oncology Clinic Visit     Return Malignant neoplasm of axillary tail of right breast in female, estrogen receptor positive, labs / md /  infusion today       Assessment and Plan    Cancer Staging  Malignant neoplasm of nipple of right breast in female, estrogen receptor positive (H)  Staging form: Breast, AJCC 8th Edition  - Clinical stage from 4/21/2021: Stage IB (cT2, cN1(f), cM0, G3, ER+, WA+, HER2+) - Signed by Chelly Sanchez MD on 8/17/2021      ECOG Performance    0 - Independent     Pain  Pain Score: No Pain (0)    #.  cT2 N1 M0 invasive ductal carcinoma, grade 3, triple positive. ypT0 N0.   Clinically well.  Labs were reviewed and her CBC, CMP is entirely normal.     Reviewed the pathology result.  She achieved complete response from neoadjuvant chemotherapy which means favorable progression free survival.  She will continue trastuzumab/Pertuzumab to complete 1 year of adjuvant therapy.  No modification of adjuvant therapy needed.   She is going to start adjuvant radiation therapy in a week or 2.   Today I discussed about adjuvant endocrine therapy.  I discussed about 2 possible options of tamoxifen or aromatase inhibitor + ovarian suppression.  Because of her high risk nature of breast cancer, I recommended aromatase inhibitor+ ovarian suppression.  I reviewed the side effects of both approaches and the rational behind it.  I discussed about ovarian suppression can be achieved by GnRH agonist (example Zoladex) or bilateral oophorectomies.  I discussed about early menopause and risk of cardiovascular disease, bone density loss etc.  She will think about those options.  I sent medication information through [employer].    Follow-up with me in 3 weeks with labs and infusion appointment as well as to review the pathology result to finalize adjuvant treatment plan.   Advised her to start calcium and vitamin D supplement over-the-counter 1 tablet twice a day with meals.     #.  Assessment of cardiac function for continued treatment of cardiotoxic medication    She is asymptomatic.  Echocardiogram requested.    #, Completed hereditary breast and  ovarian cancer gene panels and tested negative for 37 genes analyzed.  (9/2/2021)      Encounter Diagnoses:    Problem List Items Addressed This Visit     None      Visit Diagnoses     Encounter for monitoring cardiotoxic drug therapy    -  Primary    Relevant Orders    Echocardiogram Limited    Malignant neoplasm of upper-inner quadrant of right breast in female, estrogen receptor positive (H)        Relevant Orders    Echocardiogram Limited             CC: Ye Lira MD   ______________________________________________________________________________  Diagnosis  4/2021-presented to the emergency room with a mass in her right breast along with pain for about 3 months or so.  Denies nipple changes or discharge.  Underwent diagnostic mammogram and ultrasound on 4/13/2021 and it confirmed right breast mass in the 8 o'clock position, 5 cm from the nipple of 2.2 x 2.8 x 1.4 cm with abnormal appearing right axillary lymph node of 1.7 cm with cortical thickness.  She underwent ultrasound-guided core needle biopsy on the same day and it showed invasive ductal carcinoma, grade 3, ER moderate to strong positive, DE strongly positive, HER-2 positive by IHC.              - She met with Dr. Perdomo from surgery and recommended to consider neoadjuvant chemotherapy to HER-2 positive lymph node positive disease.   - 11/15/2021-achieved CR. ypT0 N0     LMP- 11/17/2021    Treatment to date  5/13/2021-8/26/2021 neoadjuvant TCHP x6.  Required hospitalization with neutropenic fever, neutropenic colitis after cycle #1.  Neulasta added starting cycle #2 chemotherapy.  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 Kierra Zelaya)  Continue trastuzumab and Pertuzumab while awaiting right breast lumpectomy.  11/15/2021- right breast lumpectomy and right sentinel lymph node biopsy (Dr. Wilhelm).    History of  Present Illness    Ms. Joanne Colon presented today accompanied by her sister.      She had surgery on 11/15/2021.  She is recovering well.  She met with Dr. Christensen from radiation oncology for starting adjuvant radiation.  She is scheduled for simulation tomorrow.  No intolerable side effects from current treatment.  She reported she has her menstrual cycle returned a month ago with a few spotting's and last menstrual cycle was on 2021 which seems pretty close to her normal cycle.    Review of systems  Apart from describing in HPI, the remainder of comprehensive ROS was negative.    Past History    Past Medical History:   Diagnosis Date     Breast cancer (H)      Breast mass, right 2021    Dx 2021      Chemotherapy induced nausea and vomiting 2021     Chemotherapy-induced neutropenia (H)      Colitis, acute      Encounter for antineoplastic chemotherapy 2021     Fever and chills 2021     Functional diarrhea 2021     Hepatitis      Lesion of liver less than 1 cm in diameter      Malignant neoplasm of nipple of right breast in female, estrogen receptor positive (H) 2021     Mucositis due to chemotherapy      Neutropenic fever (H)      Neutropenic sepsis (H) 2021       Past Surgical History:   Procedure Laterality Date     BIOPSY NODE SENTINEL Right 11/15/2021    Procedure: [place] Lymph Node Biopsy;  Surgeon: Delaney Perdomo MD;  [place] [facility] [place]      SECTION      x two     EXAM UNDER ANESTHESIA ANUS N/A 10/20/2021    Procedure: EXAM UNDER ANESTHESIA,;  Surgeon: Jelly Zelaya MD;  Location: [facility] OR     FISTULOTOMY RECTUM N/A 10/20/2021    Procedure:   SETON placement ;  Surgeon: Jelly Zelaya MD;  Location: [facility] OR     IR CHEST PORT PLACEMENT > 5 YRS OF AGE  2021     IR PORT PLACEMENT >5 YEARS  2021     LUMPECTOMY BREAST Right 11/15/2021    Procedure: Right lumpectomy after Wire Localization,;  Surgeon: Delaney Perdomo MD;   Location: [facility]     US BIOPSY FINE NEEDLE ASPIRATION LYMPH NODE (BREAST) RIGHT Right 4/13/2021     US BREAST CORE BIOPSY RIGHT Right 4/13/2021         Physical Exam    /67 (BP Location: Left arm, Patient Position: Sitting, Cuff Size: Adult Regular)   Pulse 79   Temp 98.3  F (36.8  C) (Oral)   Resp 16   Ht 1.524 m (5')   Wt 57.7 kg (127 lb 1.6 oz)   SpO2 99%   BMI 24.82 kg/m      General: alert, awake, not in acute distress  HEENT: Head: Normal, normocephalic, atraumatic.  Eye: Normal external eye, conjunctiva, lids cornea, LISSETH.  Nose: Normal external nose, mucus membranes and septum.  Pharynx: Normal buccal mucosa. Normal pharynx.  Neck / Thyroid: Supple, no masses, nodes, nodules or enlargement.  Lymphatics: No abnormally enlarged lymph nodes.  Abdomen: abdomen is soft without significant tenderness, masses, organomegaly or guarding  Extremities: normal strength, tone, and muscle mass  Skin: normal. no rash or abnormalities  CNS: non focal.    Lab Results    Recent Results (from the past 168 hour(s))   Comprehensive metabolic panel   Result Value Ref Range    Sodium 137 136 - 145 mmol/L    Potassium 4.0 3.5 - 5.0 mmol/L    Chloride 105 98 - 107 mmol/L    Carbon Dioxide (CO2) 21 (L) 22 - 31 mmol/L    Anion Gap 11 5 - 18 mmol/L    Urea Nitrogen 13 8 - 22 mg/dL    Creatinine 0.68 0.60 - 1.10 mg/dL    Calcium 9.5 8.5 - 10.5 mg/dL    Glucose 98 70 - 125 mg/dL    Alkaline Phosphatase 72 45 - 120 U/L    AST 15 0 - 40 U/L    ALT 11 0 - 45 U/L    Protein Total 7.5 6.0 - 8.0 g/dL    Albumin 4.0 3.5 - 5.0 g/dL    Bilirubin Total 0.3 0.0 - 1.0 mg/dL    GFR Estimate >90 >60 mL/min/1.73m2   CBC with platelets and differential   Result Value Ref Range    WBC Count 6.2 4.0 - 11.0 10e3/uL    RBC Count 4.27 3.80 - 5.20 10e6/uL    Hemoglobin 12.5 11.7 - 15.7 g/dL    Hematocrit 38.5 35.0 - 47.0 %    MCV 90 78 - 100 fL    MCH 29.3 26.5 - 33.0 pg    MCHC 32.5 31.5 - 36.5 g/dL    RDW 12.3 10.0 - 15.0 %     Platelet Count 273 150 - 450 10e3/uL    % Neutrophils 54 %    % Lymphocytes 37 %    % Monocytes 6 %    % Eosinophils 2 %    % Basophils 1 %    % Immature Granulocytes 0 %    NRBCs per 100 WBC 0 <1 /100    Absolute Neutrophils 3.4 1.6 - 8.3 10e3/uL    Absolute Lymphocytes 2.3 0.8 - 5.3 10e3/uL    Absolute Monocytes 0.4 0.0 - 1.3 10e3/uL    Absolute Eosinophils 0.1 0.0 - 0.7 10e3/uL    Absolute Basophils 0.1 0.0 - 0.2 10e3/uL    Absolute Immature Granulocytes 0.0 <=0.0 10e3/uL    Absolute NRBCs 0.0 10e3/uL   Extra Red Top Tube   Result Value Ref Range    Hold Specimen JIC        Imaging    Image Guided Breast Loc w [place] Node Inj Right    Result Date: 11/15/2021  INDICATION: Pre-operative localization of invasive ductal carcinoma at 9 o'clock, 5 cm from the nipple. PROCEDURE: Informed consent was obtained from the patient. The breast was cleansed with ChloraPrep. Lidocaine was used for local anesthesia. A -gauge needle was then advanced to the area of abnormality. A localization wire was then deployed.  was injected subdermally along the upper outer aspect of the areolar margin. Post-procedure mammograms demonstrate the localization wire in appropriate position. The previously placed marker was visualized. The patient tolerated this well. IMPRESSION: Sonographically guided right wire localization. A specimen was not sent for radiography. [place] Lymph Node Injection:  530 uci lymphoseek Right upper outer major-areolar region of right breast Bernard Negro I spent greater than 50% of the 45 minutes on the date of service on counseling and coordination of care.    Signed by: Chelly Sanchez MD      Again, thank you for allowing me to participate in the care of your patient.        Sincerely,        Chelly Sanchez MD

## 2021-11-30 NOTE — PROGRESS NOTES
Infusion Nursing Note:  Joanne Colon presents today for chemotherapy  Patient seen by provider today: Yes:    present during visit today: Yes    Note:.      Intravenous Access:  Implanted Port.    Treatment Conditions:  Results reviewed, labs MET treatment parameters, ok to proceed with treatment.      Post Infusion Assessment:  Patient tolerated infusion without incident.  Blood return noted pre and post infusion.  Access discontinued per protocol.       Discharge Plan:   Patient and/or family verbalized understanding of discharge instructions and all questions answered.      Maria Elena Jackson RN

## 2021-11-30 NOTE — PROGRESS NOTES
[facility] Hematology and Oncology Progress Note    Patient: Joanne Colon  MRN: [id number]  Date of Service: Nov 30, 2021         Reason for Visit    Chief Complaint   Patient presents with     Oncology Clinic Visit     Return Malignant neoplasm of axillary tail of right breast in female, estrogen receptor positive, labs / md / infusion today       Assessment and Plan    Cancer Staging  Malignant neoplasm of nipple of right breast in female, estrogen receptor positive (H)  Staging form: Breast, AJCC 8th Edition  - Clinical stage from 4/21/2021: Stage IB (cT2, cN1(f), cM0, G3, ER+, OK+, HER2+) - Signed by Chelly Sanchez MD on 8/17/2021      ECOG Performance    0 - Independent     Pain  Pain Score: No Pain (0)    #.  cT2 N1 M0 invasive ductal carcinoma, grade 3, triple positive. ypT0 N0.   Clinically well.  Labs were reviewed and her CBC, CMP is entirely normal.     Reviewed the pathology result.  She achieved complete response from neoadjuvant chemotherapy which means favorable progression free survival.  She will continue trastuzumab/Pertuzumab to complete 1 year of adjuvant therapy.  No modification of adjuvant therapy needed.   She is going to start adjuvant radiation therapy in a week or 2.   Today I discussed about adjuvant endocrine therapy.  I discussed about 2 possible options of tamoxifen or aromatase inhibitor + ovarian suppression.  Because of her high risk nature of breast cancer, I recommended aromatase inhibitor+ ovarian suppression.  I reviewed the side effects of both approaches and the rational behind it.  I discussed about ovarian suppression can be achieved by GnRH agonist (example Zoladex) or bilateral oophorectomies.  I discussed about early menopause and risk of cardiovascular disease, bone density loss etc.  She will think about those options.  I sent medication information through [employer].    Follow-up with me in 3 weeks with labs and infusion appointment as well as to review the  pathology result to finalize adjuvant treatment plan.   Advised her to start calcium and vitamin D supplement over-the-counter 1 tablet twice a day with meals.     #.  Assessment of cardiac function for continued treatment of cardiotoxic medication    She is asymptomatic.  Echocardiogram requested.    #, Completed hereditary breast and ovarian cancer gene panels and tested negative for 37 genes analyzed.  (9/2/2021)      Encounter Diagnoses:    Problem List Items Addressed This Visit     None      Visit Diagnoses     Encounter for monitoring cardiotoxic drug therapy    -  Primary    Relevant Orders    Echocardiogram Limited    Malignant neoplasm of upper-inner quadrant of right breast in female, estrogen receptor positive (H)        Relevant Orders    Echocardiogram Limited             CC: Ye Lira MD   ______________________________________________________________________________  Diagnosis  4/2021-presented to the emergency room with a mass in her right breast along with pain for about 3 months or so.  Denies nipple changes or discharge.  Underwent diagnostic mammogram and ultrasound on 4/13/2021 and it confirmed right breast mass in the 8 o'clock position, 5 cm from the nipple of 2.2 x 2.8 x 1.4 cm with abnormal appearing right axillary lymph node of 1.7 cm with cortical thickness.  She underwent ultrasound-guided core needle biopsy on the same day and it showed invasive ductal carcinoma, grade 3, ER moderate to strong positive, OK strongly positive, HER-2 positive by IHC.              - She met with Dr. Perdomo from surgery and recommended to consider neoadjuvant chemotherapy to HER-2 positive lymph node positive disease.   - 11/15/2021-achieved CR. ypT0 N0     LMP- 11/17/2021    Treatment to date  5/13/2021-8/26/2021 neoadjuvant TCHP x6.  Required hospitalization with neutropenic fever, neutropenic colitis after cycle #1.  Neulasta added starting cycle #2 chemotherapy.  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 Kierra Zelaya)  Continue trastuzumab and Pertuzumab while awaiting right breast lumpectomy.  11/15/2021- right breast lumpectomy and right sentinel lymph node biopsy (Dr. Wilhelm).    History of Present Illness    Ms. Joanne Colon presented today accompanied by her sister.      She had surgery on 11/15/2021.  She is recovering well.  She met with Dr. Christensen from radiation oncology for starting adjuvant radiation.  She is scheduled for simulation tomorrow.  No intolerable side effects from current treatment.  She reported she has her menstrual cycle returned a month ago with a few spotting's and last menstrual cycle was on 2021 which seems pretty close to her normal cycle.    Review of systems  Apart from describing in HPI, the remainder of comprehensive ROS was negative.    Past History    Past Medical History:   Diagnosis Date     Breast cancer (H)      Breast mass, right 2021    Dx 2021      Chemotherapy induced nausea and vomiting 2021     Chemotherapy-induced neutropenia (H)      Colitis, acute      Encounter for antineoplastic chemotherapy 2021     Fever and chills 2021     Functional diarrhea 2021     Hepatitis      Lesion of liver less than 1 cm in diameter      Malignant neoplasm of nipple of right breast in female, estrogen receptor positive (H) 2021     Mucositis due to chemotherapy      Neutropenic fever (H)      Neutropenic sepsis (H) 2021       Past Surgical History:   Procedure Laterality Date     BIOPSY NODE SENTINEL Right 11/15/2021    Procedure: [place] Lymph Node Biopsy;  Surgeon: Delaney Perdomo MD;  Location: [facility]      SECTION      x two     EXAM UNDER ANESTHESIA ANUS N/A 10/20/2021    Procedure: EXAM UNDER ANESTHESIA,;  Surgeon: Jelly Zelaya MD;  Location: [facility] OR     FISTULOTOMY  RECTUM N/A 10/20/2021    Procedure:   SETON placement ;  Surgeon: Jelly Zelaya MD;  Location: [facility] OR     IR CHEST PORT PLACEMENT > 5 YRS OF AGE  4/28/2021     IR PORT PLACEMENT >5 YEARS  4/28/2021     LUMPECTOMY BREAST Right 11/15/2021    Procedure: Right lumpectomy after Wire Localization,;  Surgeon: Delaney Perdomo MD;  Location: [facility] OR     US BIOPSY FINE NEEDLE ASPIRATION LYMPH NODE (BREAST) RIGHT Right 4/13/2021     US BREAST CORE BIOPSY RIGHT Right 4/13/2021         Physical Exam    /67 (BP Location: Left arm, Patient Position: Sitting, Cuff Size: Adult Regular)   Pulse 79   Temp 98.3  F (36.8  C) (Oral)   Resp 16   Ht 1.524 m (5')   Wt 57.7 kg (127 lb 1.6 oz)   SpO2 99%   BMI 24.82 kg/m      General: alert, awake, not in acute distress  HEENT: Head: Normal, normocephalic, atraumatic.  Eye: Normal external eye, conjunctiva, lids cornea, LISSETH.  Nose: Normal external nose, mucus membranes and septum.  Pharynx: Normal buccal mucosa. Normal pharynx.  Neck / Thyroid: Supple, no masses, nodes, nodules or enlargement.  Lymphatics: No abnormally enlarged lymph nodes.  Abdomen: abdomen is soft without significant tenderness, masses, organomegaly or guarding  Extremities: normal strength, tone, and muscle mass  Skin: normal. no rash or abnormalities  CNS: non focal.    Lab Results    Recent Results (from the past 168 hour(s))   Comprehensive metabolic panel   Result Value Ref Range    Sodium 137 136 - 145 mmol/L    Potassium 4.0 3.5 - 5.0 mmol/L    Chloride 105 98 - 107 mmol/L    Carbon Dioxide (CO2) 21 (L) 22 - 31 mmol/L    Anion Gap 11 5 - 18 mmol/L    Urea Nitrogen 13 8 - 22 mg/dL    Creatinine 0.68 0.60 - 1.10 mg/dL    Calcium 9.5 8.5 - 10.5 mg/dL    Glucose 98 70 - 125 mg/dL    Alkaline Phosphatase 72 45 - 120 U/L    AST 15 0 - 40 U/L    ALT 11 0 - 45 U/L    Protein Total 7.5 6.0 - 8.0 g/dL    Albumin 4.0 3.5 - 5.0 g/dL    Bilirubin Total 0.3 0.0 - 1.0 mg/dL    GFR Estimate >90 >60  mL/min/1.73m2   CBC with platelets and differential   Result Value Ref Range    WBC Count 6.2 4.0 - 11.0 10e3/uL    RBC Count 4.27 3.80 - 5.20 10e6/uL    Hemoglobin 12.5 11.7 - 15.7 g/dL    Hematocrit 38.5 35.0 - 47.0 %    MCV 90 78 - 100 fL    MCH 29.3 26.5 - 33.0 pg    MCHC 32.5 31.5 - 36.5 g/dL    RDW 12.3 10.0 - 15.0 %    Platelet Count 273 150 - 450 10e3/uL    % Neutrophils 54 %    % Lymphocytes 37 %    % Monocytes 6 %    % Eosinophils 2 %    % Basophils 1 %    % Immature Granulocytes 0 %    NRBCs per 100 WBC 0 <1 /100    Absolute Neutrophils 3.4 1.6 - 8.3 10e3/uL    Absolute Lymphocytes 2.3 0.8 - 5.3 10e3/uL    Absolute Monocytes 0.4 0.0 - 1.3 10e3/uL    Absolute Eosinophils 0.1 0.0 - 0.7 10e3/uL    Absolute Basophils 0.1 0.0 - 0.2 10e3/uL    Absolute Immature Granulocytes 0.0 <=0.0 10e3/uL    Absolute NRBCs 0.0 10e3/uL   Extra Red Top Tube   Result Value Ref Range    Hold Specimen JIC        Imaging    Image Guided Breast Loc w [place] Node Inj Right    Result Date: 11/15/2021  INDICATION: Pre-operative localization of invasive ductal carcinoma at 9 o'clock, 5 cm from the nipple. PROCEDURE: Informed consent was obtained from the patient. The breast was cleansed with ChloraPrep. Lidocaine was used for local anesthesia. A -gauge needle was then advanced to the area of abnormality. A localization wire was then deployed.  was injected subdermally along the upper outer aspect of the areolar margin. Post-procedure mammograms demonstrate the localization wire in appropriate position. The previously placed marker was visualized. The patient tolerated this well. IMPRESSION: Sonographically guided right wire localization. A specimen was not sent for radiography. [place] Lymph Node Injection:  530 uci lymphoseek Right upper outer major-areolar region of right breast Bernard Negro I spent greater than 50% of the 45 minutes on the date of service on counseling and coordination of care.    Signed by: Chelly Sanchez  MD

## 2021-11-30 NOTE — PATIENT INSTRUCTIONS
Patient Education     Exemestane Oral Tablet 25 mg  Uses  This medicine is used for the following purposes:    cancer prevention    cancer  Instructions  Take the medicine with food.  Store at room temperature in a dry place. Do not keep in the bathroom.  Keep the medicine away from heat and light.  It is important that you keep taking each dose of this medicine on time even if you are feeling well.  If you forget to take a dose on time, take it as soon as you remember. If it is almost time for the next dose, do not take the missed dose. Return to your normal dosing schedule. Do not take 2 doses of this medicine at one time.  Please tell your doctor and pharmacist about all the medicines you take. Include both prescription and over-the-counter medicines. Also tell them about any vitamins, herbal medicines, or anything else you take for your health.  Do not suddenly stop taking this medicine. Check with your doctor before stopping.  It is very important that you continue using this medicine for the full number of days that it is prescribed. Please do not stop the medicine even if you start to feel better after the first few days.  Use effective birth control to avoid pregnancy.  It is very important that you follow your doctor's instructions for all blood tests.  It is very important that you keep all appointments for medical exams and tests while on this medicine.  Do not take the medicine more than once during 24 hours.  Cautions  This medicine should not be used in patients taking estrogen. The combination of the two medicines can increase the risk of serious side effects, including heart disease and blood clots. Speak with your doctor before taking any medicine containing estrogen.  Tell your doctor and pharmacist if you ever had an allergic reaction to a medicine. Symptoms of an allergic reaction can include trouble breathing, skin rash, itching, swelling, or severe dizziness.  Some patients taking this medicine  have experienced serious side effects. Please speak with your doctor to understand the risks and benefits associated with this medicine.  This medicine is associated with an increased risk for serious blood clots. Speak with your doctor about the benefits and risks from using this medicine.  Do not use the medication any more than instructed.  Your ability to stay alert or to react quickly may be impaired by this medicine. Do not drive or operate machinery until you know how this medicine will affect you.  Please check with your doctor before drinking alcohol while on this medicine.  Avoid smoking while on this medicine. Smoking may increase your risk for bone fractures.  Ask your doctor to show you how to perform a self breast examination. You should check your breasts once a month and report any changes to your doctor.  Tell the doctor or pharmacist if you are pregnant, planning to be pregnant, or breastfeeding.  Do not breastfeed while on this medicine.  Do not use this medicine if you are pregnant. If you become pregnant while on this medicine, contact your doctor immediately.  Ask your pharmacist if this medicine can interact with any of your other medicines. Be sure to tell them about all the medicines you take.  Please tell all your doctors and dentists that you are on this medicine before they provide care.  Do not start or stop any other medicines without first speaking to your doctor or pharmacist.  Do not share this medicine with anyone who has not been prescribed this medicine.  Always refill this medicine before it runs out.  Side Effects  The following is a list of some common side effects from this medicine. Please speak with your doctor about what you should do if you experience these or other side effects.    agitated feeling or trouble sleeping    diarrhea    dizziness    lack of energy and tiredness    feeling of heat or flushing    hair loss    pain in the joints    muscle  pain    nausea    stomach upset or abdominal pain    sweating  Call your doctor or get medical help right away if you notice any of these more serious side effects:    loss of balance    increased risk for bone fractures    bone pain    chest pain    coughing up blood or vomit that looks like coffee grounds    depression or feeling sad    swelling of the legs, feet, and hands    fainting    severe or persistent headache    jaw pain    sudden leg pain, swelling, warmth or redness    feeling of numbness or tingling in your hands and feet    shortness of breath    difficulty speaking    symptoms of stroke (such as one-sided weakness, slurred speech, confusion)    unusual or unexplained tiredness or weakness    dark urine    cramping of the uterus or bleeding from the vagina    vaginal bleeding or spotting between periods    blurring or changes of vision    severe or persistent vomiting    weakness on one side of the body    yellowing of the eyes    yellowing of the skin  A few people may have an allergic reactions to this medicine. Symptoms can include difficulty breathing, skin rash, itching, swelling, or severe dizziness. If you notice any of these symptoms, seek medical help quickly.  Extra  Please speak with your doctor, nurse, or pharmacist if you have any questions about this medicine.  [email or web address]  IMPORTANT NOTE: This document tells you briefly how to take your medicine, but it does not tell you all there is to know about it.Your doctor or pharmacist may give you other documents about your medicine. Please talk to them if you have any questions.Always follow their advice. There is a more complete description of this medicine available in English.Scan this code on your smartphone or tablet or use the web address below. You can also ask your pharmacist for a printout. If you have any questions, please ask your pharmacist.     2021 "[employer].           ZolZALORA Drug  Implant 10.8 mg  Uses  This medicine is used for the following purposes:    hormone imbalance    cancer  Instructions  Please tell your doctor and pharmacist about all the medicines you take. Include both prescription and over-the-counter medicines. Also tell them about any vitamins, herbal medicines, or anything else you take for your health.  This medicine may affect your blood sugar levels. If you have diabetes, talk to your doctor before changing the dose of your diabetes medicine.  It is very important that you keep all appointments for medical exams and tests while on this medicine.  Cautions  Some patients taking this medicine have experienced serious side effects. Please speak with your doctor to understand the risks and benefits associated with this medicine.  This medicine is associated with an increased risk of serious heart problems, heart attack, and stroke. Please speak with your doctor about the risks and benefits of using this medicine. Contact your doctor immediately if you experience chest pain or difficulty breathing.  Tell your doctor and pharmacist if you ever had an allergic reaction to a medicine. Symptoms of an allergic reaction can include trouble breathing, skin rash, itching, swelling, or severe dizziness.  Tell your doctor if you have severe or persistent sweating, diarrhea or vomiting. These can increase your risk of a serious side effect.  Tell the doctor or pharmacist if you are pregnant, planning to be pregnant, or breastfeeding.  Do not breastfeed while on this medicine.  This medicine can cause birth defects. Speak with your doctor about birth control methods that should be used while on this medicine.  Women should use birth control that do not use hormones (such as condoms) while using this medicine and for 12 weeks after stopping to prevent pregnancy.  Ask your pharmacist if this medicine can interact with any of your other medicines. Be sure to tell them about all the medicines  you take.  Please tell all your doctors and dentists that you are on this medicine before they provide care.  Do not start or stop any other medicines without first speaking to your doctor or pharmacist.  Side Effects  The following is a list of some common side effects from this medicine. Please speak with your doctor about what you should do if you experience these or other side effects.    breast pain or swelling    feeling of heat or flushing    headaches    menstruation changes (missed or fewer periods)  Call your doctor or get medical help right away if you notice any of these more serious side effects:    back pain    increased risk for bone fractures    bone pain    chest pain    confusion    dizziness    fainting    severe or persistent headache    fast or irregular heart beats    jaw pain    mood changes    loss of movement anywhere on the body    feeling of numbness or tingling in your hands and feet    shortness of breath    symptoms of stroke (such as one-sided weakness, slurred speech, confusion)    thirst    difficulty or discomfort urinating    increased urinary frequency    blood in urine    blurring or changes of vision    severe or persistent vomiting    pain, heat, swelling or redness at the incision site  A few people may have an allergic reactions to this medicine. Symptoms can include difficulty breathing, skin rash, itching, swelling, or severe dizziness. If you notice any of these symptoms, seek medical help quickly.  Extra  Please speak with your doctor, nurse, or pharmacist if you have any questions about this medicine.  [email or web address]  IMPORTANT NOTE: This document tells you briefly how to take your medicine, but it does not tell you all there is to know about it.Your doctor or pharmacist may give you other documents about your medicine. Please talk to them if you have any questions.Always follow their advice. There is a more complete description of this  medicine available in English.Scan this code on your smartphone or tablet or use the web address below. You can also ask your pharmacist for a printout. If you have any questions, please ask your pharmacist.     2021 [employer].

## 2021-12-01 ENCOUNTER — ALLIED HEALTH/NURSE VISIT (OUTPATIENT)
Dept: RADIATION ONCOLOGY | Facility: HOSPITAL | Age: 36
End: 2021-12-01
Attending: INTERNAL MEDICINE
Payer: MEDICAID

## 2021-12-01 DIAGNOSIS — C50.011 MALIGNANT NEOPLASM OF NIPPLE OF RIGHT BREAST IN FEMALE, ESTROGEN RECEPTOR POSITIVE (H): Primary | ICD-10-CM

## 2021-12-01 DIAGNOSIS — Z17.0 MALIGNANT NEOPLASM OF NIPPLE OF RIGHT BREAST IN FEMALE, ESTROGEN RECEPTOR POSITIVE (H): Primary | ICD-10-CM

## 2021-12-01 PROCEDURE — 77470 SPECIAL RADIATION TREATMENT: CPT | Mod: 26 | Performed by: RADIOLOGY

## 2021-12-01 PROCEDURE — 77470 SPECIAL RADIATION TREATMENT: CPT | Performed by: RADIOLOGY

## 2021-12-01 PROCEDURE — 77263 THER RADIOLOGY TX PLNG CPLX: CPT | Performed by: RADIOLOGY

## 2021-12-01 PROCEDURE — 77290 THER RAD SIMULAJ FIELD CPLX: CPT | Mod: 26 | Performed by: RADIOLOGY

## 2021-12-01 PROCEDURE — 77334 RADIATION TREATMENT AID(S): CPT | Performed by: RADIOLOGY

## 2021-12-01 PROCEDURE — 77334 RADIATION TREATMENT AID(S): CPT | Mod: 26 | Performed by: RADIOLOGY

## 2021-12-01 PROCEDURE — 77290 THER RAD SIMULAJ FIELD CPLX: CPT | Performed by: RADIOLOGY

## 2021-12-06 ENCOUNTER — APPOINTMENT (OUTPATIENT)
Dept: RADIATION ONCOLOGY | Facility: HOSPITAL | Age: 36
End: 2021-12-06
Attending: INTERNAL MEDICINE
Payer: MEDICAID

## 2021-12-06 PROCEDURE — 77334 RADIATION TREATMENT AID(S): CPT | Performed by: RADIOLOGY

## 2021-12-06 PROCEDURE — 77300 RADIATION THERAPY DOSE PLAN: CPT | Mod: 26 | Performed by: RADIOLOGY

## 2021-12-06 PROCEDURE — 77300 RADIATION THERAPY DOSE PLAN: CPT | Performed by: RADIOLOGY

## 2021-12-06 PROCEDURE — 77295 3-D RADIOTHERAPY PLAN: CPT | Mod: 26 | Performed by: RADIOLOGY

## 2021-12-06 PROCEDURE — 77334 RADIATION TREATMENT AID(S): CPT | Mod: 26 | Performed by: RADIOLOGY

## 2021-12-06 PROCEDURE — 77295 3-D RADIOTHERAPY PLAN: CPT | Performed by: RADIOLOGY

## 2021-12-07 ENCOUNTER — LAB (OUTPATIENT)
Dept: LAB | Facility: CLINIC | Age: 36
End: 2021-12-07
Payer: MEDICAID

## 2021-12-07 DIAGNOSIS — C50.011 MALIGNANT NEOPLASM OF NIPPLE OF RIGHT BREAST IN FEMALE, ESTROGEN RECEPTOR POSITIVE (H): ICD-10-CM

## 2021-12-07 DIAGNOSIS — Z17.0 MALIGNANT NEOPLASM OF NIPPLE OF RIGHT BREAST IN FEMALE, ESTROGEN RECEPTOR POSITIVE (H): ICD-10-CM

## 2021-12-07 LAB — SARS-COV-2 RNA RESP QL NAA+PROBE: NEGATIVE

## 2021-12-07 PROCEDURE — U0005 INFEC AGEN DETEC AMPLI PROBE: HCPCS

## 2021-12-07 PROCEDURE — U0003 INFECTIOUS AGENT DETECTION BY NUCLEIC ACID (DNA OR RNA); SEVERE ACUTE RESPIRATORY SYNDROME CORONAVIRUS 2 (SARS-COV-2) (CORONAVIRUS DISEASE [COVID-19]), AMPLIFIED PROBE TECHNIQUE, MAKING USE OF HIGH THROUGHPUT TECHNOLOGIES AS DESCRIBED BY CMS-2020-01-R: HCPCS

## 2021-12-09 ENCOUNTER — APPOINTMENT (OUTPATIENT)
Dept: RADIATION ONCOLOGY | Facility: CLINIC | Age: 36
End: 2021-12-09
Attending: RADIOLOGY
Payer: MEDICAID

## 2021-12-09 VITALS
RESPIRATION RATE: 16 BRPM | DIASTOLIC BLOOD PRESSURE: 67 MMHG | OXYGEN SATURATION: 100 % | SYSTOLIC BLOOD PRESSURE: 108 MMHG | HEART RATE: 76 BPM

## 2021-12-09 DIAGNOSIS — C50.611 MALIGNANT NEOPLASM OF AXILLARY TAIL OF RIGHT BREAST IN FEMALE, ESTROGEN RECEPTOR POSITIVE (H): Primary | ICD-10-CM

## 2021-12-09 DIAGNOSIS — Z17.0 MALIGNANT NEOPLASM OF AXILLARY TAIL OF RIGHT BREAST IN FEMALE, ESTROGEN RECEPTOR POSITIVE (H): Primary | ICD-10-CM

## 2021-12-09 PROCEDURE — 77387 GUIDANCE FOR RADJ TX DLVR: CPT | Performed by: RADIOLOGY

## 2021-12-09 PROCEDURE — 77280 THER RAD SIMULAJ FIELD SMPL: CPT | Performed by: RADIOLOGY

## 2021-12-09 PROCEDURE — 77412 RADIATION TX DELIVERY LVL 3: CPT | Performed by: RADIOLOGY

## 2021-12-09 PROCEDURE — 77280 THER RAD SIMULAJ FIELD SMPL: CPT | Mod: 26 | Performed by: RADIOLOGY

## 2021-12-09 NOTE — LETTER
12/9/2021         RE: Joanne Colon  [address] [place] [address]        Dear Colleague,    Thank you for referring your patient, Joanne Colon, to the [facility] RADIATION ONCOLOGY [place]. Please see a copy of my visit note below.         RADIATION ONCOLOGY WEEKLY TREATMENT VISIT NOTE    Assessment:       ICD-10-CM    1. Malignant neoplasm of axillary tail of right breast in female, estrogen receptor positive (H)  C50.611     Z17.0         Impression/Plan:   36 year old female for triple positive right breast IDC. On US measured 2.2cm with single node measuring 1.7 cm. Pathology IDC grade III, axillary LN positive for disease. Post-chemo lumpectomy with no evidence for in situ or invasive disease, 0/2 SLN pos. Heriditary breast and ocarian cancer gene panels, negative for 37 genes analyzed.     1. Continue radiation therapy as     Radiation: Site: Right Breast  Stereotactic Radiosurgery: No  Today's Dose: 265  Total Dose for Breast: 5240  Today's Fraction/Total Fraction Breast: 1/20      Subjective:     HPI: Joanne Colon is a 36 year old female with Malignant neoplasm of axillary tail of right breast in female, estrogen receptor positive (H)      The following portions of the patient's history were reviewed and updated as appropriate: allergies, current medications, past family history, past medical history, past social history, past surgical history and problem list.    Assessment   Body Site:  Breast                           Site: Right Breast  Stereotactic Radiosurgery: No  Today's Dose: 265  Total Dose for Breast: 5240  Today's Fraction/Total Fraction Breast: 1/20                                     Sexuality Alteration                    Emotional Alteration       Comfort Alteration       Nutrition Alteration      Skin Alteration      AUA Assessment                                           Accompanied by       Objective:     Exam:     There were no vitals filed for this  visit.    Wt Readings from Last 8 Encounters:   11/30/21 57.7 kg (127 lb 1.6 oz)   11/15/21 59.4 kg (131 lb)   11/11/21 60.3 kg (133 lb)   11/09/21 58.9 kg (129 lb 14.4 oz)   10/20/21 57.6 kg (127 lb)   10/19/21 58.6 kg (129 lb 1.6 oz)   10/18/21 57.7 kg (127 lb 4.8 oz)   09/27/21 57.6 kg (127 lb)       General: Alert and oriented. Sitting comfortably.   Patient non acute appearing, asymptomatic. Mask on. No cough, no respiratory distress.   Rania has no erythema.    Treatment Summary to Date    Aria chart and setup information reviewed    I, Sanam Christensen MD personally performed the services described in this documentation, as scribed by Dallas Sheldon in my presence, and it is both accurate and complete.    Signed by: Sanam Christensen MD, MPH       Pt arrives ambulatory to [facility] Radiation Therapy with sister Shadi for initial radiation therapy treament; pt's sister translating. Pt given Radiaplex and education on application. Pt encouraged to ask questions when they arise.       Again, thank you for allowing me to participate in the care of your patient.        Sincerely,        Sanam Christensen MD

## 2021-12-09 NOTE — PROGRESS NOTES
Pt arrives ambulatory to [facility] Radiation Therapy with sister Shadi for initial radiation therapy treament; pt's sister translating. Pt given Radiaplex and education on application. Pt encouraged to ask questions when they arise.

## 2021-12-09 NOTE — PROGRESS NOTES
RADIATION ONCOLOGY WEEKLY TREATMENT VISIT NOTE    Assessment:       ICD-10-CM    1. Malignant neoplasm of axillary tail of right breast in female, estrogen receptor positive (H)  C50.611     Z17.0         Impression/Plan:   36 year old female for triple positive right breast IDC. On US measured 2.2cm with single node measuring 1.7 cm. Pathology IDC grade III, axillary LN positive for disease. Post-chemo lumpectomy with no evidence for in situ or invasive disease, 0/2 SLN pos. Heriditary breast and ocarian cancer gene panels, negative for 37 genes analyzed.     1. Continue radiation therapy as     Radiation: Site: Right Breast  Stereotactic Radiosurgery: No  Today's Dose: 265  Total Dose for Breast: 5240  Today's Fraction/Total Fraction Breast: 1/20      Subjective:     HPI: Joanne Colon is a 36 year old female with Malignant neoplasm of axillary tail of right breast in female, estrogen receptor positive (H)      The following portions of the patient's history were reviewed and updated as appropriate: allergies, current medications, past family history, past medical history, past social history, past surgical history and problem list.    Assessment   Body Site:  Breast                           Site: Right Breast  Stereotactic Radiosurgery: No  Today's Dose: 265  Total Dose for Breast: 5240  Today's Fraction/Total Fraction Breast: 1/20                                     Sexuality Alteration                    Emotional Alteration       Comfort Alteration       Nutrition Alteration      Skin Alteration      AUA Assessment                                           Accompanied by       Objective:     Exam:     There were no vitals filed for this visit.    Wt Readings from Last 8 Encounters:   11/30/21 57.7 kg (127 lb 1.6 oz)   11/15/21 59.4 kg (131 lb)   11/11/21 60.3 kg (133 lb)   11/09/21 58.9 kg (129 lb 14.4 oz)   10/20/21 57.6 kg (127 lb)   10/19/21 58.6 kg (129 lb 1.6 oz)   10/18/21 57.7 kg (127  lb 4.8 oz)   09/27/21 57.6 kg (127 lb)       General: Alert and oriented. Sitting comfortably.   Patient non acute appearing, asymptomatic. Mask on. No cough, no respiratory distress.   Rania has no erythema.    Treatment Summary to Date    Aria chart and setup information reviewed    ISanam MD personally performed the services described in this documentation, as scribed by Dallas Sheldon in my presence, and it is both accurate and complete.    Signed by: Sanam Christensen MD, MPH

## 2021-12-10 ENCOUNTER — APPOINTMENT (OUTPATIENT)
Dept: RADIATION ONCOLOGY | Facility: CLINIC | Age: 36
End: 2021-12-10
Attending: RADIOLOGY
Payer: MEDICAID

## 2021-12-10 PROCEDURE — 77387 GUIDANCE FOR RADJ TX DLVR: CPT | Performed by: STUDENT IN AN ORGANIZED HEALTH CARE EDUCATION/TRAINING PROGRAM

## 2021-12-10 PROCEDURE — 77402 RADIATION TX DELIVERY LVL 1: CPT | Performed by: STUDENT IN AN ORGANIZED HEALTH CARE EDUCATION/TRAINING PROGRAM

## 2021-12-12 ENCOUNTER — HEALTH MAINTENANCE LETTER (OUTPATIENT)
Age: 36
End: 2021-12-12

## 2021-12-13 ENCOUNTER — APPOINTMENT (OUTPATIENT)
Dept: RADIATION ONCOLOGY | Facility: CLINIC | Age: 36
End: 2021-12-13
Attending: RADIOLOGY
Payer: MEDICAID

## 2021-12-13 PROCEDURE — 77402 RADIATION TX DELIVERY LVL 1: CPT | Performed by: RADIOLOGY

## 2021-12-13 PROCEDURE — 77387 GUIDANCE FOR RADJ TX DLVR: CPT | Performed by: RADIOLOGY

## 2021-12-14 ENCOUNTER — APPOINTMENT (OUTPATIENT)
Dept: RADIATION ONCOLOGY | Facility: CLINIC | Age: 36
End: 2021-12-14
Attending: RADIOLOGY
Payer: MEDICAID

## 2021-12-14 PROCEDURE — 77412 RADIATION TX DELIVERY LVL 3: CPT | Performed by: RADIOLOGY

## 2021-12-14 PROCEDURE — 77387 GUIDANCE FOR RADJ TX DLVR: CPT | Performed by: STUDENT IN AN ORGANIZED HEALTH CARE EDUCATION/TRAINING PROGRAM

## 2021-12-14 PROCEDURE — 77387 GUIDANCE FOR RADJ TX DLVR: CPT | Performed by: RADIOLOGY

## 2021-12-15 ENCOUNTER — APPOINTMENT (OUTPATIENT)
Dept: RADIATION ONCOLOGY | Facility: CLINIC | Age: 36
End: 2021-12-15
Attending: RADIOLOGY
Payer: MEDICAID

## 2021-12-15 PROCEDURE — 77412 RADIATION TX DELIVERY LVL 3: CPT | Performed by: STUDENT IN AN ORGANIZED HEALTH CARE EDUCATION/TRAINING PROGRAM

## 2021-12-15 PROCEDURE — 77427 RADIATION TX MANAGEMENT X5: CPT | Performed by: RADIOLOGY

## 2021-12-15 PROCEDURE — 77336 RADIATION PHYSICS CONSULT: CPT | Performed by: RADIOLOGY

## 2021-12-15 PROCEDURE — 77387 GUIDANCE FOR RADJ TX DLVR: CPT | Performed by: STUDENT IN AN ORGANIZED HEALTH CARE EDUCATION/TRAINING PROGRAM

## 2021-12-16 ENCOUNTER — APPOINTMENT (OUTPATIENT)
Dept: RADIATION ONCOLOGY | Facility: CLINIC | Age: 36
End: 2021-12-16
Attending: RADIOLOGY
Payer: MEDICAID

## 2021-12-16 DIAGNOSIS — C50.611 MALIGNANT NEOPLASM OF AXILLARY TAIL OF RIGHT BREAST IN FEMALE, ESTROGEN RECEPTOR POSITIVE (H): Primary | ICD-10-CM

## 2021-12-16 DIAGNOSIS — Z17.0 MALIGNANT NEOPLASM OF AXILLARY TAIL OF RIGHT BREAST IN FEMALE, ESTROGEN RECEPTOR POSITIVE (H): Primary | ICD-10-CM

## 2021-12-16 PROCEDURE — 77387 GUIDANCE FOR RADJ TX DLVR: CPT | Performed by: RADIOLOGY

## 2021-12-16 PROCEDURE — 77412 RADIATION TX DELIVERY LVL 3: CPT | Performed by: RADIOLOGY

## 2021-12-16 NOTE — PROGRESS NOTES
RADIATION ONCOLOGY WEEKLY TREATMENT VISIT NOTE    Assessment:       ICD-10-CM    1. Malignant neoplasm of axillary tail of right breast in female, estrogen receptor positive (H)  C50.611     Z17.0         Impression/Plan:   36 year old French speaking female S/P TCHP for triple positive right breast IDC. On US measured 2.2cm with single node measuring 1.7 cm. Pathology IDC grade III, axillary LN positive for disease. Post-chemo lumpectomy with no evidence for in situ or invasive disease, 0/2 SLN pos.   1. Continue radiation therapy as prescribed.    2. Tolerating radiation therapy well.       Patient elected sister for French translation.     Radiation: Site: Right Breast  Stereotactic Radiosurgery: No  Today's Dose: 1590  Total Dose for Breast: 5240  Today's Fraction/Total Fraction Breast:     Subjective:     HPI: Joanne Colon is a 36 year old female with Malignant neoplasm of axillary tail of right breast in female, estrogen receptor positive (H)      The following portions of the patient's history were reviewed and updated as appropriate: allergies, current medications, past family history, past medical history, past social history, past surgical history and problem list.    Assessment   Body Site:  Breast                           Site: Right Breast  Stereotactic Radiosurgery: No  Today's Dose: 1590  Total Dose for Breast: 5240  Today's Fraction/Total Fraction Breast:                                      Sexuality Alteration                    Emotional Alteration       Comfort Alteration   KPS: 100 % Normal, no complaints  Fatigue (ONS scale): 0: No Fatigue  Pain Location: denies   Nutrition Alteration   Anorexia: 0: None  Nausea: 0: None  Vomitin: None  Skin Alteration   Skin Sensation: 0: No problem  Skin Reaction: 0: None  AUA Assessment                                           Accompanied by       Objective:     Exam:     There were no vitals filed for this visit.    Wt  Readings from Last 8 Encounters:   11/30/21 57.7 kg (127 lb 1.6 oz)   11/15/21 59.4 kg (131 lb)   11/11/21 60.3 kg (133 lb)   11/09/21 58.9 kg (129 lb 14.4 oz)   10/20/21 57.6 kg (127 lb)   10/19/21 58.6 kg (129 lb 1.6 oz)   10/18/21 57.7 kg (127 lb 4.8 oz)   09/27/21 57.6 kg (127 lb)       General: Alert and oriented. Sitting comfortably.   Patient non acute appearing, asymptomatic. Mask on. No cough, no respiratory distress.   Rania has no erythema.    Treatment Summary to Date    Aria chart and setup information reviewed    I, Sanam Christensen MD personally performed the services described in this documentation, as scribed by Dallas Sheldon in my presence, and it is both accurate and complete.    Signed by: Sanam Christensen MD, MPH

## 2021-12-16 NOTE — LETTER
12/16/2021         RE: Joanne Colon  [address] [place] [address]        Dear Colleague,    Thank you for referring your patient, Joanne Colon, to the [facility] RADIATION ONCOLOGY [place]. Please see a copy of my visit note below.         RADIATION ONCOLOGY WEEKLY TREATMENT VISIT NOTE    Assessment:       ICD-10-CM    1. Malignant neoplasm of axillary tail of right breast in female, estrogen receptor positive (H)  C50.611     Z17.0         Impression/Plan:   36 year old Yi speaking female S/P TCHP for triple positive right breast IDC. On US measured 2.2cm with single node measuring 1.7 cm. Pathology IDC grade III, axillary LN positive for disease. Post-chemo lumpectomy with no evidence for in situ or invasive disease, 0/2 SLN pos.   1. Continue radiation therapy as prescribed.    2. Tolerating radiation therapy well.       Patient elected sister for Yi translation.     Radiation: Site: Right Breast  Stereotactic Radiosurgery: No  Today's Dose: 1590  Total Dose for Breast: 5240  Today's Fraction/Total Fraction Breast: 6/20    Subjective:     HPI: Joanne Colon is a 36 year old female with Malignant neoplasm of axillary tail of right breast in female, estrogen receptor positive (H)      The following portions of the patient's history were reviewed and updated as appropriate: allergies, current medications, past family history, past medical history, past social history, past surgical history and problem list.    Assessment   Body Site:  Breast                           Site: Right Breast  Stereotactic Radiosurgery: No  Today's Dose: 1590  Total Dose for Breast: 5240  Today's Fraction/Total Fraction Breast: 6/20                                     Sexuality Alteration                    Emotional Alteration       Comfort Alteration   KPS: 100 % Normal, no complaints  Fatigue (ONS scale): 0: No Fatigue  Pain Location: denies   Nutrition Alteration   Anorexia: 0: None  Nausea:  0: None  Vomitin: None  Skin Alteration   Skin Sensation: 0: No problem  Skin Reaction: 0: None  AUA Assessment                                           Accompanied by       Objective:     Exam:     There were no vitals filed for this visit.    Wt Readings from Last 8 Encounters:   21 57.7 kg (127 lb 1.6 oz)   11/15/21 59.4 kg (131 lb)   21 60.3 kg (133 lb)   21 58.9 kg (129 lb 14.4 oz)   10/20/21 57.6 kg (127 lb)   10/19/21 58.6 kg (129 lb 1.6 oz)   10/18/21 57.7 kg (127 lb 4.8 oz)   21 57.6 kg (127 lb)       General: Alert and oriented. Sitting comfortably.   Patient non acute appearing, asymptomatic. Mask on. No cough, no respiratory distress.   Rania has no erythema.    Treatment Summary to Date    Aria chart and setup information reviewed    I, Sanam Christensen MD personally performed the services described in this documentation, as scribed by Dallas Sheldon in my presence, and it is both accurate and complete.    Signed by: Sanam Christensen MD, MPH         Again, thank you for allowing me to participate in the care of your patient.        Sincerely,        Sanam Christensen MD

## 2021-12-17 ENCOUNTER — HOSPITAL ENCOUNTER (OUTPATIENT)
Dept: CARDIOLOGY | Facility: CLINIC | Age: 36
Discharge: HOME | End: 2021-12-17
Attending: INTERNAL MEDICINE | Admitting: INTERNAL MEDICINE
Payer: MEDICAID

## 2021-12-17 ENCOUNTER — APPOINTMENT (OUTPATIENT)
Dept: RADIATION ONCOLOGY | Facility: CLINIC | Age: 36
End: 2021-12-17
Attending: RADIOLOGY
Payer: MEDICAID

## 2021-12-17 DIAGNOSIS — Z79.899 ENCOUNTER FOR MONITORING CARDIOTOXIC DRUG THERAPY: ICD-10-CM

## 2021-12-17 DIAGNOSIS — Z51.81 ENCOUNTER FOR MONITORING CARDIOTOXIC DRUG THERAPY: ICD-10-CM

## 2021-12-17 DIAGNOSIS — Z17.0 MALIGNANT NEOPLASM OF UPPER-INNER QUADRANT OF RIGHT BREAST IN FEMALE, ESTROGEN RECEPTOR POSITIVE (H): ICD-10-CM

## 2021-12-17 DIAGNOSIS — C50.211 MALIGNANT NEOPLASM OF UPPER-INNER QUADRANT OF RIGHT BREAST IN FEMALE, ESTROGEN RECEPTOR POSITIVE (H): ICD-10-CM

## 2021-12-17 LAB — LVEF ECHO: NORMAL

## 2021-12-17 PROCEDURE — 93325 DOPPLER ECHO COLOR FLOW MAPG: CPT | Mod: 26 | Performed by: INTERNAL MEDICINE

## 2021-12-17 PROCEDURE — 93321 DOPPLER ECHO F-UP/LMTD STD: CPT

## 2021-12-17 PROCEDURE — 77412 RADIATION TX DELIVERY LVL 3: CPT | Performed by: STUDENT IN AN ORGANIZED HEALTH CARE EDUCATION/TRAINING PROGRAM

## 2021-12-17 PROCEDURE — 93308 TTE F-UP OR LMTD: CPT | Mod: 26 | Performed by: INTERNAL MEDICINE

## 2021-12-17 PROCEDURE — 77387 GUIDANCE FOR RADJ TX DLVR: CPT | Performed by: STUDENT IN AN ORGANIZED HEALTH CARE EDUCATION/TRAINING PROGRAM

## 2021-12-17 PROCEDURE — 93321 DOPPLER ECHO F-UP/LMTD STD: CPT | Mod: 26 | Performed by: INTERNAL MEDICINE

## 2021-12-20 ENCOUNTER — APPOINTMENT (OUTPATIENT)
Dept: RADIATION ONCOLOGY | Facility: CLINIC | Age: 36
End: 2021-12-20
Attending: INTERNAL MEDICINE
Payer: MEDICAID

## 2021-12-20 PROCEDURE — 77412 RADIATION TX DELIVERY LVL 3: CPT | Performed by: STUDENT IN AN ORGANIZED HEALTH CARE EDUCATION/TRAINING PROGRAM

## 2021-12-20 PROCEDURE — 77387 GUIDANCE FOR RADJ TX DLVR: CPT | Performed by: STUDENT IN AN ORGANIZED HEALTH CARE EDUCATION/TRAINING PROGRAM

## 2021-12-21 ENCOUNTER — APPOINTMENT (OUTPATIENT)
Dept: RADIATION ONCOLOGY | Facility: CLINIC | Age: 36
End: 2021-12-21
Attending: RADIOLOGY
Payer: MEDICAID

## 2021-12-21 PROCEDURE — 77412 RADIATION TX DELIVERY LVL 3: CPT | Performed by: RADIOLOGY

## 2021-12-21 PROCEDURE — 77387 GUIDANCE FOR RADJ TX DLVR: CPT | Performed by: RADIOLOGY

## 2021-12-22 ENCOUNTER — APPOINTMENT (OUTPATIENT)
Dept: RADIATION ONCOLOGY | Facility: CLINIC | Age: 36
End: 2021-12-22
Attending: RADIOLOGY
Payer: MEDICAID

## 2021-12-22 ENCOUNTER — ONCOLOGY VISIT (OUTPATIENT)
Dept: ONCOLOGY | Facility: CLINIC | Age: 36
End: 2021-12-22
Attending: RADIOLOGY
Payer: MEDICAID

## 2021-12-22 ENCOUNTER — INFUSION THERAPY VISIT (OUTPATIENT)
Dept: INFUSION THERAPY | Facility: CLINIC | Age: 36
End: 2021-12-22
Attending: RADIOLOGY
Payer: MEDICAID

## 2021-12-22 VITALS
HEART RATE: 88 BPM | DIASTOLIC BLOOD PRESSURE: 70 MMHG | TEMPERATURE: 98.4 F | BODY MASS INDEX: 24.94 KG/M2 | OXYGEN SATURATION: 96 % | SYSTOLIC BLOOD PRESSURE: 102 MMHG | WEIGHT: 127 LBS | HEIGHT: 60 IN | RESPIRATION RATE: 16 BRPM

## 2021-12-22 DIAGNOSIS — C50.011 MALIGNANT NEOPLASM OF NIPPLE OF RIGHT BREAST IN FEMALE, ESTROGEN RECEPTOR POSITIVE (H): Primary | ICD-10-CM

## 2021-12-22 DIAGNOSIS — Z51.11 ENCOUNTER FOR ANTINEOPLASTIC CHEMOTHERAPY: ICD-10-CM

## 2021-12-22 DIAGNOSIS — Z17.0 MALIGNANT NEOPLASM OF NIPPLE OF RIGHT BREAST IN FEMALE, ESTROGEN RECEPTOR POSITIVE (H): Primary | ICD-10-CM

## 2021-12-22 PROBLEM — K59.1 FUNCTIONAL DIARRHEA: Status: RESOLVED | Noted: 2021-07-18 | Resolved: 2021-12-22

## 2021-12-22 PROCEDURE — 77387 GUIDANCE FOR RADJ TX DLVR: CPT | Performed by: RADIOLOGY

## 2021-12-22 PROCEDURE — 99215 OFFICE O/P EST HI 40 MIN: CPT | Performed by: INTERNAL MEDICINE

## 2021-12-22 PROCEDURE — 77336 RADIATION PHYSICS CONSULT: CPT | Performed by: RADIOLOGY

## 2021-12-22 PROCEDURE — 77412 RADIATION TX DELIVERY LVL 3: CPT | Performed by: RADIOLOGY

## 2021-12-22 PROCEDURE — 77427 RADIATION TX MANAGEMENT X5: CPT | Performed by: RADIOLOGY

## 2021-12-22 PROCEDURE — 96413 CHEMO IV INFUSION 1 HR: CPT

## 2021-12-22 PROCEDURE — 250N000011 HC RX IP 250 OP 636: Performed by: INTERNAL MEDICINE

## 2021-12-22 PROCEDURE — 96417 CHEMO IV INFUS EACH ADDL SEQ: CPT

## 2021-12-22 PROCEDURE — 258N000003 HC RX IP 258 OP 636: Performed by: INTERNAL MEDICINE

## 2021-12-22 PROCEDURE — G0463 HOSPITAL OUTPT CLINIC VISIT: HCPCS

## 2021-12-22 RX ORDER — ALBUTEROL SULFATE 0.83 MG/ML
2.5 SOLUTION RESPIRATORY (INHALATION)
Status: DISCONTINUED | OUTPATIENT
Start: 2021-12-22 | End: 2021-12-22 | Stop reason: HOSPADM

## 2021-12-22 RX ORDER — ALBUTEROL SULFATE 90 UG/1
1-2 AEROSOL, METERED RESPIRATORY (INHALATION)
Status: DISCONTINUED | OUTPATIENT
Start: 2021-12-22 | End: 2021-12-22 | Stop reason: HOSPADM

## 2021-12-22 RX ORDER — EPINEPHRINE 1 MG/ML
0.3 INJECTION, SOLUTION, CONCENTRATE INTRAVENOUS EVERY 5 MIN PRN
Status: DISCONTINUED | OUTPATIENT
Start: 2021-12-22 | End: 2021-12-22 | Stop reason: HOSPADM

## 2021-12-22 RX ORDER — LORAZEPAM 2 MG/ML
0.5 INJECTION INTRAMUSCULAR EVERY 4 HOURS PRN
Status: CANCELLED
Start: 2022-01-10

## 2021-12-22 RX ORDER — HEPARIN SODIUM (PORCINE) LOCK FLUSH IV SOLN 100 UNIT/ML 100 UNIT/ML
5 SOLUTION INTRAVENOUS
Status: CANCELLED | OUTPATIENT
Start: 2021-12-22

## 2021-12-22 RX ORDER — ACETAMINOPHEN 325 MG/1
650 TABLET ORAL
Status: CANCELLED
Start: 2022-01-10

## 2021-12-22 RX ORDER — ALBUTEROL SULFATE 90 UG/1
1-2 AEROSOL, METERED RESPIRATORY (INHALATION)
Status: CANCELLED
Start: 2021-12-22

## 2021-12-22 RX ORDER — METHYLPREDNISOLONE SODIUM SUCCINATE 125 MG/2ML
125 INJECTION, POWDER, LYOPHILIZED, FOR SOLUTION INTRAMUSCULAR; INTRAVENOUS
Status: DISCONTINUED | OUTPATIENT
Start: 2021-12-22 | End: 2021-12-22 | Stop reason: HOSPADM

## 2021-12-22 RX ORDER — HEPARIN SODIUM (PORCINE) LOCK FLUSH IV SOLN 100 UNIT/ML 100 UNIT/ML
5 SOLUTION INTRAVENOUS
Status: CANCELLED | OUTPATIENT
Start: 2022-01-10

## 2021-12-22 RX ORDER — DIPHENHYDRAMINE HCL 50 MG
50 CAPSULE ORAL
Status: CANCELLED | OUTPATIENT
Start: 2021-12-22

## 2021-12-22 RX ORDER — DIPHENHYDRAMINE HYDROCHLORIDE 50 MG/ML
50 INJECTION INTRAMUSCULAR; INTRAVENOUS
Status: CANCELLED
Start: 2021-12-22

## 2021-12-22 RX ORDER — METHYLPREDNISOLONE SODIUM SUCCINATE 125 MG/2ML
125 INJECTION, POWDER, LYOPHILIZED, FOR SOLUTION INTRAMUSCULAR; INTRAVENOUS
Status: CANCELLED
Start: 2022-01-10

## 2021-12-22 RX ORDER — ALBUTEROL SULFATE 0.83 MG/ML
2.5 SOLUTION RESPIRATORY (INHALATION)
Status: CANCELLED | OUTPATIENT
Start: 2022-01-10

## 2021-12-22 RX ORDER — NALOXONE HYDROCHLORIDE 0.4 MG/ML
0.2 INJECTION, SOLUTION INTRAMUSCULAR; INTRAVENOUS; SUBCUTANEOUS
Status: DISCONTINUED | OUTPATIENT
Start: 2021-12-22 | End: 2021-12-22 | Stop reason: HOSPADM

## 2021-12-22 RX ORDER — LORAZEPAM 2 MG/ML
0.5 INJECTION INTRAMUSCULAR EVERY 4 HOURS PRN
Status: CANCELLED
Start: 2021-12-22

## 2021-12-22 RX ORDER — MEPERIDINE HYDROCHLORIDE 50 MG/ML
25 INJECTION INTRAMUSCULAR; INTRAVENOUS; SUBCUTANEOUS EVERY 30 MIN PRN
Status: CANCELLED | OUTPATIENT
Start: 2022-01-10

## 2021-12-22 RX ORDER — HEPARIN SODIUM,PORCINE 10 UNIT/ML
5 VIAL (ML) INTRAVENOUS
Status: CANCELLED | OUTPATIENT
Start: 2021-12-22

## 2021-12-22 RX ORDER — ALBUTEROL SULFATE 90 UG/1
1-2 AEROSOL, METERED RESPIRATORY (INHALATION)
Status: CANCELLED
Start: 2022-01-10

## 2021-12-22 RX ORDER — METHYLPREDNISOLONE SODIUM SUCCINATE 125 MG/2ML
125 INJECTION, POWDER, LYOPHILIZED, FOR SOLUTION INTRAMUSCULAR; INTRAVENOUS
Status: CANCELLED
Start: 2021-12-22

## 2021-12-22 RX ORDER — MEPERIDINE HYDROCHLORIDE 50 MG/ML
25 INJECTION INTRAMUSCULAR; INTRAVENOUS; SUBCUTANEOUS EVERY 30 MIN PRN
Status: DISCONTINUED | OUTPATIENT
Start: 2021-12-22 | End: 2021-12-22 | Stop reason: HOSPADM

## 2021-12-22 RX ORDER — EPINEPHRINE 1 MG/ML
0.3 INJECTION, SOLUTION, CONCENTRATE INTRAVENOUS EVERY 5 MIN PRN
Status: CANCELLED | OUTPATIENT
Start: 2022-01-10

## 2021-12-22 RX ORDER — DIPHENHYDRAMINE HYDROCHLORIDE 50 MG/ML
50 INJECTION INTRAMUSCULAR; INTRAVENOUS
Status: DISCONTINUED | OUTPATIENT
Start: 2021-12-22 | End: 2021-12-22 | Stop reason: HOSPADM

## 2021-12-22 RX ORDER — HEPARIN SODIUM (PORCINE) LOCK FLUSH IV SOLN 100 UNIT/ML 100 UNIT/ML
5 SOLUTION INTRAVENOUS
Status: DISCONTINUED | OUTPATIENT
Start: 2021-12-22 | End: 2021-12-22 | Stop reason: HOSPADM

## 2021-12-22 RX ORDER — DIPHENHYDRAMINE HCL 50 MG
50 CAPSULE ORAL
Status: CANCELLED | OUTPATIENT
Start: 2022-01-10

## 2021-12-22 RX ORDER — ACETAMINOPHEN 325 MG/1
650 TABLET ORAL
Status: CANCELLED
Start: 2021-12-22

## 2021-12-22 RX ORDER — EPINEPHRINE 1 MG/ML
0.3 INJECTION, SOLUTION, CONCENTRATE INTRAVENOUS EVERY 5 MIN PRN
Status: CANCELLED | OUTPATIENT
Start: 2021-12-22

## 2021-12-22 RX ORDER — HEPARIN SODIUM,PORCINE 10 UNIT/ML
5 VIAL (ML) INTRAVENOUS
Status: CANCELLED | OUTPATIENT
Start: 2022-01-10

## 2021-12-22 RX ORDER — ALBUTEROL SULFATE 0.83 MG/ML
2.5 SOLUTION RESPIRATORY (INHALATION)
Status: CANCELLED | OUTPATIENT
Start: 2021-12-22

## 2021-12-22 RX ORDER — NALOXONE HYDROCHLORIDE 0.4 MG/ML
0.2 INJECTION, SOLUTION INTRAMUSCULAR; INTRAVENOUS; SUBCUTANEOUS
Status: CANCELLED | OUTPATIENT
Start: 2022-01-10

## 2021-12-22 RX ORDER — NALOXONE HYDROCHLORIDE 0.4 MG/ML
0.2 INJECTION, SOLUTION INTRAMUSCULAR; INTRAVENOUS; SUBCUTANEOUS
Status: CANCELLED | OUTPATIENT
Start: 2021-12-22

## 2021-12-22 RX ORDER — DIPHENHYDRAMINE HYDROCHLORIDE 50 MG/ML
50 INJECTION INTRAMUSCULAR; INTRAVENOUS
Status: CANCELLED
Start: 2022-01-10

## 2021-12-22 RX ORDER — MEPERIDINE HYDROCHLORIDE 50 MG/ML
25 INJECTION INTRAMUSCULAR; INTRAVENOUS; SUBCUTANEOUS EVERY 30 MIN PRN
Status: CANCELLED | OUTPATIENT
Start: 2021-12-22

## 2021-12-22 RX ADMIN — PERTUZUMAB 420 MG: 30 INJECTION, SOLUTION, CONCENTRATE INTRAVENOUS at 11:06

## 2021-12-22 RX ADMIN — SODIUM CHLORIDE 250 ML: 9 INJECTION, SOLUTION INTRAVENOUS at 11:02

## 2021-12-22 RX ADMIN — TRASTUZUMAB 348 MG: 150 INJECTION, POWDER, LYOPHILIZED, FOR SOLUTION INTRAVENOUS at 11:42

## 2021-12-22 RX ADMIN — HEPARIN SODIUM (PORCINE) LOCK FLUSH IV SOLN 100 UNIT/ML 5 ML: 100 SOLUTION at 12:16

## 2021-12-22 ASSESSMENT — PAIN SCALES - GENERAL: PAINLEVEL: NO PAIN (0)

## 2021-12-22 ASSESSMENT — MIFFLIN-ST. JEOR: SCORE: 1177.57

## 2021-12-22 NOTE — LETTER
12/22/2021         RE: Joanne Colon  [address]  [facility] [address]        Dear Colleague,    Thank you for referring your patient, Joanne Colon, to the [facility] CANCER [facility]. Please see a copy of my visit note below.    Oncology Rooming Note    December 22, 2021 9:47 AM   Joanne Colon is a 36 year old female who presents for:    Chief Complaint   Patient presents with     Breast Cancer     Initial Vitals: /70 (Patient Position: Sitting)   Pulse 88   Temp 98.4  F (36.9  C) (Oral)   Resp 16   SpO2 96%  Estimated body mass index is 24.82 kg/m  as calculated from the following:    Height as of 11/30/21: 1.524 m (5').    Weight as of 11/30/21: 57.7 kg (127 lb 1.6 oz). There is no height or weight on file to calculate BSA.  No Pain (0) Comment: Data Unavailable   No LMP recorded. (Menstrual status: Chemotherapy).  Allergies reviewed: Yes  Medications reviewed: Yes    Medications: Medication refills not needed today.  Pharmacy name entered into [employer]: [id number] - [place], [place] - [address]  AT [facility]    Clinical concerns  No concerns today.       Nettie Cruz LPN                [facility] Hematology and Oncology Progress Note    Patient: Joanne Colon  MRN: [id number]  Date of Service: Dec 22, 2021         Reason for Visit    Chief Complaint   Patient presents with     Breast Cancer       Assessment and Plan    Cancer Staging  Malignant neoplasm of nipple of right breast in female, estrogen receptor positive (H)  Staging form: Breast, AJCC 8th Edition  - Clinical stage from 4/21/2021: Stage IB (cT2, cN1(f), cM0, G3, ER+, NH+, HER2+) - Signed by Chelly Sanchez MD on 8/17/2021      ECOG Performance    0 - Independent     Pain  Pain Score: No Pain (0)    #.  cT2 N1 M0 invasive ductal carcinoma, grade 3, triple positive. ypT0 N0.   Clinically well.  Exam is unremarkable.  Labs from 3 weeks ago showed normal complete metabolic profile.   No labs today.  We will continue maintenance trastuzumab and Pertuzumab as planned.     She will return to clinic on 1/10/2021 early morning to receive cycle #12 of trastuzumab and Pertuzumab [place] ( [place] time of 1240 pm).  After that, she has 5 doses of Pertuzumab and trastuzumab remaining to complete.    I also discussed about adjuvant endocrine therapy.  She decided to have bilateral oophorectomies which she decided to complete in [place].  She requested me to write down the aromatase inhibitor medication.  I gave her information about exemestane to start after bilateral oophorectomies.     She will follow-up with me upon her return to [place].     #.  Mild papular rash over anterior chest and some over the chin likely related to HER-2 directed therapy.   Discussed about using mild soap for washing.  Okay to use metronidazole gel if needed.  She will continue to watch for now.    #.  Bone health   I also reminded her to take calcium and vitamin D 1 tablet twice a day for bone health.     #.  Assessment of cardiac function for continued treatment of cardiotoxic medication    Review echocardiogram and it showed normal EF of 55-60%.    #, Completed hereditary breast and ovarian cancer gene panels and tested negative for 37 genes analyzed.  (9/2/2021)      Encounter Diagnoses:    Problem List Items Addressed This Visit     Malignant neoplasm of nipple of right breast in female, estrogen receptor positive (H) - Primary    Encounter for antineoplastic chemotherapy             CC: Ye Lira MD   ______________________________________________________________________________  Diagnosis  4/2021-presented to the emergency room with a mass in her right breast along with pain for about 3 months or so.  Denies nipple changes or discharge.  Underwent diagnostic mammogram and ultrasound on 4/13/2021 and it confirmed right breast mass in the 8 o'clock position, 5 cm from the  nipple of 2.2 x 2.8 x 1.4 cm with abnormal appearing right axillary lymph node of 1.7 cm with cortical thickness.  She underwent ultrasound-guided core needle biopsy on the same day and it showed invasive ductal carcinoma, grade 3, ER moderate to strong positive, MO strongly positive, HER-2 positive by IHC.              - She met with Dr. Perdomo from surgery and recommended to consider neoadjuvant chemotherapy to HER-2 positive lymph node positive disease.   - 11/15/2021-achieved CR. ypT0 N0     LMP- 11/17/2021    Treatment to date  5/13/2021-8/26/2021 neoadjuvant TCHP x6.  Required hospitalization with neutropenic fever, neutropenic colitis after cycle #1.  Neulasta added starting cycle #2 chemotherapy.  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 Kierra Zelaya)  Continue trastuzumab and Pertuzumab while awaiting right breast lumpectomy.  11/15/2021- right breast lumpectomy and right sentinel lymph node biopsy (Dr. Wilhelm).  1/5/2022- anticipate to complete adjuvant radiation of the right breast.    History of Present Illness    Ms. Joanne Colon presented today accompanied by her sister.      She is going to complete adjuvant radiation on 1/5/2022.  She is going back home in [place] on 1/10/2021.  She thinks she might stay there for a couple months or maybe longer.  She thinks she will likely complete or at least receive adjuvant therapy in [place].      She reported some rash over anterior chest.  No other side effects.    Review of systems  Apart from describing in HPI, the remainder of comprehensive ROS was negative.    Past History    Past Medical History:   Diagnosis Date     Breast cancer (H)      Breast mass, right 4/9/2021    Dx April 2021      Chemotherapy induced nausea and vomiting 7/18/2021     Chemotherapy-induced neutropenia (H)      Colitis, acute      Encounter for  antineoplastic chemotherapy 2021     Fever and chills 2021     Functional diarrhea 2021     Hepatitis      Lesion of liver less than 1 cm in diameter      Malignant neoplasm of nipple of right breast in female, estrogen receptor positive (H) 2021     Mucositis due to chemotherapy      Neutropenic fever (H)      Neutropenic sepsis (H) 2021       Past Surgical History:   Procedure Laterality Date     BIOPSY NODE SENTINEL Right 11/15/2021    Procedure: [place] Lymph Node Biopsy;  Surgeon: Delaney Perdomo MD;  Location: [facility] OR      SECTION      x two     EXAM UNDER ANESTHESIA ANUS N/A 10/20/2021    Procedure: EXAM UNDER ANESTHESIA,;  Surgeon: Jelly Zelaya MD;  Location: [facility] OR     FISTULOTOMY RECTUM N/A 10/20/2021    Procedure:   SETON placement ;  Surgeon: Jelly Zelaya MD;  Location: [facility] OR     IR CHEST PORT PLACEMENT > 5 YRS OF AGE  2021     IR PORT PLACEMENT >5 YEARS  2021     LUMPECTOMY BREAST Right 11/15/2021    Procedure: Right lumpectomy after Wire Localization,;  Surgeon: Delaney Perdomo MD;  Location: [facility] OR     US BIOPSY FINE NEEDLE ASPIRATION LYMPH NODE (BREAST) RIGHT Right 2021     US BREAST CORE BIOPSY RIGHT Right 2021         Physical Exam    /70 (Patient Position: Sitting)   Pulse 88   Temp 98.4  F (36.9  C) (Oral)   Resp 16   Ht 1.524 m (5')   Wt 57.6 kg (127 lb)   SpO2 96%   BMI 24.80 kg/m      General: alert, awake, not in acute distress  HEENT: Head: Normal, normocephalic, atraumatic.  Eye: Normal external eye, conjunctiva, lids cornea, LISSETH.  Nose: Normal external nose, mucus membranes and septum.  Pharynx: Normal buccal mucosa. Normal pharynx.  Neck / Thyroid: Supple, no masses, nodes, nodules or enlargement.  Lymphatics: No abnormally enlarged lymph nodes.  Chest: Normal chest wall and respirations. Clear to auscultation.  Heart: S1 S2 RRR, no murmur.   Abdomen: abdomen is soft without significant  tenderness, masses, organomegaly or guarding  Extremities: normal strength, tone, and muscle mass  Skin: Very fine acne-like rash in upper anterior chest.  No signs of infection.   Normal. no rash or abnormalities  CNS: non focal.    Lab Results    Recent Results (from the past 168 hour(s))   Echocardiogram Limited   Result Value Ref Range    LVEF  55-60%        Imaging    Echocardiogram Limited    Result Date: 2021  [id number]^LIA^IWONA[place] [address]  Name: DONNELL ROSE MRN: [id number] : 1985 Study Date: 2021 10:11 AM Age: 36 yrs Gender: Female Patient Location: [facility] Reason For Study: Encounter for monitoring cardiotoxic drug therapy, Encounter for Ordering Physician: IWONA FITZGERALD Referring Physician: IWONA FITZGERALD Performed By: ACE  BSA: 1.5 m2 Height: 60 in Weight: 127 lb HR: 84 BP: 92/53 mmHg ______________________________________________________________________________ Procedure Limited Echo Adult. ______________________________________________________________________________ Interpretation Summary  Normal right ventricle size and systolic function. Left ventricular size, wall motion and function are normal. The ejection fraction is 55-60%. No hemodynamically significant valvular abnormalities on 2D or color flow imaging. ______________________________________________________________________________ Left Ventricle Left ventricular size, wall motion and function are normal. The ejection fraction is 55-60%. There is normal left ventricular wall thickness. Left ventricular diastolic function is normal. No regional wall motion abnormalities noted.  Right Ventricle Normal right ventricle size and systolic function. TAPSE is normal, which is consistent with normal right ventricular systolic function.  Atria Normal left atrial size. Right atrial size is normal. There is no color Doppler evidence of an atrial shunt.   Mitral Valve Mitral valve leaflets appear normal. There is no evidence of mitral stenosis or clinically significant mitral regurgitation.  Tricuspid Valve Right ventricle systolic pressure estimate normal.  Aortic Valve Aortic valve leaflets appear normal. There is no evidence of aortic stenosis or clinically significant aortic regurgitation.  Pulmonic Valve The pulmonic valve is not well seen, but is grossly normal. This degree of valvular regurgitation is within normal limits.  Vessels The aorta root is normal. Normal size ascending aorta. IVC diameter <2.1 cm collapsing >50% with sniff suggests a normal RA pressure of 3 mmHg.  Pericardium There is no pericardial effusion.  ______________________________________________________________________________ MMode/2D Measurements & Calculations IVSd: 0.60 cm LVIDd: 4.1 cm LVIDs: 2.8 cm LVPWd: 0.60 cm FS: 31.7 % LV mass(C)d: 67.1 grams LV mass(C)dI: 43.6 grams/m2  LVOT diam: 2.0 cm LVOT area: 3.1 cm2 RWT: 0.29  Time Measurements MM HR: 87.0 BPM  Doppler Measurements & Calculations LV V1 max P.8 mmHg LV V1 max: 83.8 cm/sec LV V1 VTI: 16.5 cm SV(LVOT): 51.9 ml SI(LVOT): 33.8 ml/m2  ______________________________________________________________________________ Report approved by: Lesly Velásquez 2021 10:55 AM       I spent greater than 50% of the 45 minutes on the date of service on counseling and coordination of care.    Signed by: Chelly Sanchez MD      Again, thank you for allowing me to participate in the care of your patient.        Sincerely,        Chelly Sanchez MD

## 2021-12-22 NOTE — PROGRESS NOTES
[facility] Hematology and Oncology Progress Note    Patient: Joanne Colon  MRN: [id number]  Date of Service: Dec 22, 2021         Reason for Visit    Chief Complaint   Patient presents with     Breast Cancer       Assessment and Plan    Cancer Staging  Malignant neoplasm of nipple of right breast in female, estrogen receptor positive (H)  Staging form: Breast, AJCC 8th Edition  - Clinical stage from 4/21/2021: Stage IB (cT2, cN1(f), cM0, G3, ER+, NM+, HER2+) - Signed by Chelly Sanchez MD on 8/17/2021      ECOG Performance    0 - Independent     Pain  Pain Score: No Pain (0)    #.  cT2 N1 M0 invasive ductal carcinoma, grade 3, triple positive. ypT0 N0.   Clinically well.  Exam is unremarkable.  Labs from 3 weeks ago showed normal complete metabolic profile.  No labs today.  We will continue maintenance trastuzumab and Pertuzumab as planned.     She will return to clinic on 1/10/2021 early morning to receive cycle #12 of trastuzumab and Pertuzumab [place] ( [place] time of 1240 pm).  After that, she has 5 doses of Pertuzumab and trastuzumab remaining to complete.    I also discussed about adjuvant endocrine therapy.  She decided to have bilateral oophorectomies which she decided to complete in [place].  She requested me to write down the aromatase inhibitor medication.  I gave her information about exemestane to start after bilateral oophorectomies.     She will follow-up with me upon her return to [place].     #.  Mild papular rash over anterior chest and some over the chin likely related to HER-2 directed therapy.   Discussed about using mild soap for washing.  Okay to use metronidazole gel if needed.  She will continue to watch for now.    #.  Bone health   I also reminded her to take calcium and vitamin D 1 tablet twice a day for bone health.     #.  Assessment of cardiac function for continued treatment of cardiotoxic medication    Review echocardiogram and it showed  normal EF of 55-60%.    #, Completed hereditary breast and ovarian cancer gene panels and tested negative for 37 genes analyzed.  (9/2/2021)      Encounter Diagnoses:    Problem List Items Addressed This Visit     Malignant neoplasm of nipple of right breast in female, estrogen receptor positive (H) - Primary    Encounter for antineoplastic chemotherapy             CC: Ye Lira MD   ______________________________________________________________________________  Diagnosis  4/2021-presented to the emergency room with a mass in her right breast along with pain for about 3 months or so.  Denies nipple changes or discharge.  Underwent diagnostic mammogram and ultrasound on 4/13/2021 and it confirmed right breast mass in the 8 o'clock position, 5 cm from the nipple of 2.2 x 2.8 x 1.4 cm with abnormal appearing right axillary lymph node of 1.7 cm with cortical thickness.  She underwent ultrasound-guided core needle biopsy on the same day and it showed invasive ductal carcinoma, grade 3, ER moderate to strong positive, NC strongly positive, HER-2 positive by IHC.              - She met with Dr. Perdomo from surgery and recommended to consider neoadjuvant chemotherapy to HER-2 positive lymph node positive disease.   - 11/15/2021-achieved CR. ypT0 N0     LMP- 11/17/2021    Treatment to date  5/13/2021-8/26/2021 neoadjuvant TCHP x6.  Required hospitalization with neutropenic fever, neutropenic colitis after cycle #1.  Neulasta added starting cycle #2 chemotherapy.  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 Kierra Zelaya)  Continue trastuzumab and Pertuzumab while awaiting right breast lumpectomy.  11/15/2021- right breast lumpectomy and right sentinel lymph node biopsy (Dr. Wilhelm).  1/5/2022- anticipate to complete adjuvant radiation of the right breast.    History of Present  Illness    Ms. Joanne Colon presented today accompanied by her sister.      She is going to complete adjuvant radiation on 2022.  She is going back home in [place] on 1/10/2021.  She thinks she might stay there for a couple months or maybe longer.  She thinks she will likely complete or at least receive adjuvant therapy in [place].      She reported some rash over anterior chest.  No other side effects.    Review of systems  Apart from describing in HPI, the remainder of comprehensive ROS was negative.    Past History    Past Medical History:   Diagnosis Date     Breast cancer (H)      Breast mass, right 2021    Dx 2021      Chemotherapy induced nausea and vomiting 2021     Chemotherapy-induced neutropenia (H)      Colitis, acute      Encounter for antineoplastic chemotherapy 2021     Fever and chills 2021     Functional diarrhea 2021     Hepatitis      Lesion of liver less than 1 cm in diameter      Malignant neoplasm of nipple of right breast in female, estrogen receptor positive (H) 2021     Mucositis due to chemotherapy      Neutropenic fever (H)      Neutropenic sepsis (H) 2021       Past Surgical History:   Procedure Laterality Date     BIOPSY NODE SENTINEL Right 11/15/2021    Procedure: [place] Lymph Node Biopsy;  Surgeon: Delaney Perdomo MD;  Location: [facility] OR      SECTION      x two     EXAM UNDER ANESTHESIA ANUS N/A 10/20/2021    Procedure: EXAM UNDER ANESTHESIA,;  Surgeon: Jelly Zelaya MD;  Location: [facility] OR     FISTULOTOMY RECTUM N/A 10/20/2021    Procedure:   SETON placement ;  Surgeon: Jelly Zelaya MD;  Location: [facility] OR     IR CHEST PORT PLACEMENT > 5 YRS OF AGE  2021     IR PORT PLACEMENT >5 YEARS  2021     LUMPECTOMY BREAST Right 11/15/2021    Procedure: Right lumpectomy after Wire Localization,;  Surgeon: Delaney Perdomo MD;  Location: [facility]     US BIOPSY FINE NEEDLE ASPIRATION LYMPH NODE (BREAST)  RIGHT Right 2021     US BREAST CORE BIOPSY RIGHT Right 2021         Physical Exam    /70 (Patient Position: Sitting)   Pulse 88   Temp 98.4  F (36.9  C) (Oral)   Resp 16   Ht 1.524 m (5')   Wt 57.6 kg (127 lb)   SpO2 96%   BMI 24.80 kg/m      General: alert, awake, not in acute distress  HEENT: Head: Normal, normocephalic, atraumatic.  Eye: Normal external eye, conjunctiva, lids cornea, LISSETH.  Nose: Normal external nose, mucus membranes and septum.  Pharynx: Normal buccal mucosa. Normal pharynx.  Neck / Thyroid: Supple, no masses, nodes, nodules or enlargement.  Lymphatics: No abnormally enlarged lymph nodes.  Chest: Normal chest wall and respirations. Clear to auscultation.  Heart: S1 S2 RRR, no murmur.   Abdomen: abdomen is soft without significant tenderness, masses, organomegaly or guarding  Extremities: normal strength, tone, and muscle mass  Skin: Very fine acne-like rash in upper anterior chest.  No signs of infection.   Normal. no rash or abnormalities  CNS: non focal.    Lab Results    Recent Results (from the past 168 hour(s))   Echocardiogram Limited   Result Value Ref Range    LVEF  55-60%        Imaging    Echocardiogram Limited    Result Date: 2021  [id number]^LIA^IWONA[place] [address]  Name: DONNELL ROSE MRN: [id number] : 1985 Study Date: 2021 10:11 AM Age: 36 yrs Gender: Female Patient Location: [facility] Reason For Study: Encounter for monitoring cardiotoxic drug therapy, Encounter for Ordering Physician: IWONA FITZGERALD Referring Physician: IWONA FITZGERALD Performed By: ACE  BSA: 1.5 m2 Height: 60 in Weight: 127 lb HR: 84 BP: 92/53 mmHg ______________________________________________________________________________ Procedure Limited Echo Adult. ______________________________________________________________________________ Interpretation Summary  Normal right ventricle size and systolic  function. Left ventricular size, wall motion and function are normal. The ejection fraction is 55-60%. No hemodynamically significant valvular abnormalities on 2D or color flow imaging. ______________________________________________________________________________ Left Ventricle Left ventricular size, wall motion and function are normal. The ejection fraction is 55-60%. There is normal left ventricular wall thickness. Left ventricular diastolic function is normal. No regional wall motion abnormalities noted.  Right Ventricle Normal right ventricle size and systolic function. TAPSE is normal, which is consistent with normal right ventricular systolic function.  Atria Normal left atrial size. Right atrial size is normal. There is no color Doppler evidence of an atrial shunt.  Mitral Valve Mitral valve leaflets appear normal. There is no evidence of mitral stenosis or clinically significant mitral regurgitation.  Tricuspid Valve Right ventricle systolic pressure estimate normal.  Aortic Valve Aortic valve leaflets appear normal. There is no evidence of aortic stenosis or clinically significant aortic regurgitation.  Pulmonic Valve The pulmonic valve is not well seen, but is grossly normal. This degree of valvular regurgitation is within normal limits.  Vessels The aorta root is normal. Normal size ascending aorta. IVC diameter <2.1 cm collapsing >50% with sniff suggests a normal RA pressure of 3 mmHg.  Pericardium There is no pericardial effusion.  ______________________________________________________________________________ MMode/2D Measurements & Calculations IVSd: 0.60 cm LVIDd: 4.1 cm LVIDs: 2.8 cm LVPWd: 0.60 cm FS: 31.7 % LV mass(C)d: 67.1 grams LV mass(C)dI: 43.6 grams/m2  LVOT diam: 2.0 cm LVOT area: 3.1 cm2 RWT: 0.29  Time Measurements MM HR: 87.0 BPM  Doppler Measurements & Calculations LV V1 max P.8 mmHg LV V1 max: 83.8 cm/sec LV V1 VTI: 16.5 cm SV(LVOT): 51.9 ml SI(LVOT): 33.8 ml/m2   ______________________________________________________________________________ Report approved by: Lesly Velásquez 12/17/2021 10:55 AM       I spent greater than 50% of the 45 minutes on the date of service on counseling and coordination of care.    Signed by: Chelly Sanchez MD

## 2021-12-22 NOTE — PROGRESS NOTES
Infusion Nursing Note:  Joanne Colon presents today for chemo infusion.    Patient seen by provider today: Yes: Dr Sanchez   present during visit today: Yes, Language: Welsh.     Note: N/A.       Intravenous Access:  Implanted Port, brisk return.    Treatment Conditions:  Not Applicable.      Post Infusion Assessment:  Patient tolerated infusion without incident.  Blood return noted pre and post infusion.  Site patent and intact, free from redness, edema or discomfort.  No evidence of extravasations.  Access discontinued per protocol.       Discharge Plan:   Patient discharged in stable condition accompanied by: self.  Departure Mode: Ambulatory.      Amna ALMODOVAR RN

## 2021-12-22 NOTE — PROGRESS NOTES
Oncology Rooming Note    December 22, 2021 9:47 AM   Joanne Colon is a 36 year old female who presents for:    Chief Complaint   Patient presents with     Breast Cancer     Initial Vitals: /70 (Patient Position: Sitting)   Pulse 88   Temp 98.4  F (36.9  C) (Oral)   Resp 16   SpO2 96%  Estimated body mass index is 24.82 kg/m  as calculated from the following:    Height as of 11/30/21: 1.524 m (5').    Weight as of 11/30/21: 57.7 kg (127 lb 1.6 oz). There is no height or weight on file to calculate BSA.  No Pain (0) Comment: Data Unavailable   No LMP recorded. (Menstrual status: Chemotherapy).  Allergies reviewed: Yes  Medications reviewed: Yes    Medications: Medication refills not needed today.  Pharmacy name entered into [facility]: [id number] [place], [place] - [address] AT [facility]    Clinical concerns  No concerns today.       Nettie Cruz LPN

## 2021-12-22 NOTE — PATIENT INSTRUCTIONS
Today, 12/22/2021, you received cycle #11 of trastuzumab and pertuzumab and you will receive cycle #12 on 1/10/2022. Plan is to complete 17 cycles meaning you have 5 treatment of trastuzumab and pertuzumab left after 1/10/2022. They are every 3 weeks treatment.    Regarding adjuvant endocrine therapy, you decided bilateral oopherectomies (removing both ovaries) which you will complete in [place]. After the oophrectomies, you need to start exemestane 25 mg one daily by mouth for 10 years. You will need to take calcium and vitamin D for bone health.

## 2021-12-23 ENCOUNTER — OFFICE VISIT (OUTPATIENT)
Dept: RADIATION ONCOLOGY | Facility: CLINIC | Age: 36
End: 2021-12-23
Attending: RADIOLOGY
Payer: MEDICAID

## 2021-12-23 VITALS — BODY MASS INDEX: 25.21 KG/M2 | WEIGHT: 129.1 LBS

## 2021-12-23 DIAGNOSIS — Z17.0 MALIGNANT NEOPLASM OF NIPPLE OF RIGHT BREAST IN FEMALE, ESTROGEN RECEPTOR POSITIVE (H): Primary | ICD-10-CM

## 2021-12-23 DIAGNOSIS — C50.011 MALIGNANT NEOPLASM OF NIPPLE OF RIGHT BREAST IN FEMALE, ESTROGEN RECEPTOR POSITIVE (H): Primary | ICD-10-CM

## 2021-12-23 PROCEDURE — 77387 GUIDANCE FOR RADJ TX DLVR: CPT | Performed by: STUDENT IN AN ORGANIZED HEALTH CARE EDUCATION/TRAINING PROGRAM

## 2021-12-23 PROCEDURE — 77412 RADIATION TX DELIVERY LVL 3: CPT | Performed by: STUDENT IN AN ORGANIZED HEALTH CARE EDUCATION/TRAINING PROGRAM

## 2021-12-23 NOTE — PROGRESS NOTES
RADIATION ONCOLOGY WEEKLY TREATMENT VISIT NOTE      Assessment / Impression     Malignant neoplasm of nipple of right breast in female, estrogen receptor positive (H) [C50.011, Z17.0]    Cancer Staging  Malignant neoplasm of nipple of right breast in female, estrogen receptor positive (H)  Staging form: Breast, AJCC 8th Edition  - Clinical stage from 4/21/2021: Stage IB (cT2, cN1(f), cM0, G3, ER+, LA+, HER2+) - Signed by Chelly Sanchez MD on 8/17/2021    Tolerating radiation therapy well.  All questions and concerns addressed.    Grade 2 RT dermatitis with just some cracking of the nipple    Plan:     Continue radiation treatment as prescribed.  Radiation:   Site: Right breast  Stereotactic Radiosurgery: No  Today's Dose: 2915  Total Dose for Breast: 5240  Today's Fraction/Total Fraction Breast: 11/20    Continue current skin care, has Aquaphor, also given Mepilex dressings to try    She will notify us if she has any leg pain, swelling or weakness.    Subjective:      HPI: Joanne Colon is a 36 year old female with  Malignant neoplasm of nipple of right breast in female, estrogen receptor positive (H) [C50.011, Z17.0]    She is tolerating radiation therapy well.  She does have increased skin irritation with cracking around the nipple.  She has Aquaphor at home to use.  No swelling upper extremity.  Has some generalized fatigue and legs feel tired.  No leg swelling or pain.  Questions answered.    The following portions of the patient's history were reviewed and updated as appropriate: allergies, current medications, past family history, past medical history, past social history, past surgical history and problem list.    Assessment                  Body Site:  Breast                           Site: Right breast  Stereotactic Radiosurgery: No  Today's Dose: 2915  Total Dose for Breast: 5240  Today's Fraction/Total Fraction Breast: 11/20  Drainage: 0: Absent                                      Sexuality Alteration                    Emotional Alteration       Comfort Alteration       Nutrition Alteration      Skin Alteration      AUA Assessment                                           Accompanied by       Objective:     Exam:     There were no vitals filed for this visit.    Wt Readings from Last 8 Encounters:   12/22/21 57.6 kg (127 lb)   11/30/21 57.7 kg (127 lb 1.6 oz)   11/15/21 59.4 kg (131 lb)   11/11/21 60.3 kg (133 lb)   11/09/21 58.9 kg (129 lb 14.4 oz)   10/20/21 57.6 kg (127 lb)   10/19/21 58.6 kg (129 lb 1.6 oz)   10/18/21 57.7 kg (127 lb 4.8 oz)       General: Alert and oriented, in no acute distress  Rania has mild Erythema.      (Patient seen via video and not in person due to high risk Covid exposure)      Treatment Summary to Date    Aria chart and setup information reviewed    Sanam Muñoz MD

## 2021-12-23 NOTE — LETTER
12/23/2021         RE: Joanne Colon  [address] [place] [address]        Dear Colleague,    Thank you for referring your patient, Joanne Colon, to the [facility] RADIATION ONCOLOGY [place]. Please see a copy of my visit note below.      RADIATION ONCOLOGY WEEKLY TREATMENT VISIT NOTE      Assessment / Impression     Malignant neoplasm of nipple of right breast in female, estrogen receptor positive (H) [C50.011, Z17.0]    Cancer Staging  Malignant neoplasm of nipple of right breast in female, estrogen receptor positive (H)  Staging form: Breast, AJCC 8th Edition  - Clinical stage from 4/21/2021: Stage IB (cT2, cN1(f), cM0, G3, ER+, FL+, HER2+) - Signed by Chelly Sanchez MD on 8/17/2021    Tolerating radiation therapy well.  All questions and concerns addressed.    Grade 2 RT dermatitis with just some cracking of the nipple    Plan:     Continue radiation treatment as prescribed.  Radiation:   Site: Right breast  Stereotactic Radiosurgery: No  Today's Dose: 2915  Total Dose for Breast: 5240  Today's Fraction/Total Fraction Breast: 11/20    Continue current skin care, has Aquaphor, also given Mepilex dressings to try    She will notify us if she has any leg pain, swelling or weakness.    Subjective:      HPI: Joanne Colon is a 36 year old female with  Malignant neoplasm of nipple of right breast in female, estrogen receptor positive (H) [C50.011, Z17.0]    She is tolerating radiation therapy well.  She does have increased skin irritation with cracking around the nipple.  She has Aquaphor at home to use.  No swelling upper extremity.  Has some generalized fatigue and legs feel tired.  No leg swelling or pain.  Questions answered.    The following portions of the patient's history were reviewed and updated as appropriate: allergies, current medications, past family history, past medical history, past social history, past surgical history and problem list.    Assessment                  Body  Site:  Breast                           Site: Right breast  Stereotactic Radiosurgery: No  Today's Dose: 2915  Total Dose for Breast: 5240  Today's Fraction/Total Fraction Breast: 11/20  Drainage: 0: Absent                                     Sexuality Alteration                    Emotional Alteration       Comfort Alteration       Nutrition Alteration      Skin Alteration      AUA Assessment                                           Accompanied by       Objective:     Exam:     There were no vitals filed for this visit.    Wt Readings from Last 8 Encounters:   12/22/21 57.6 kg (127 lb)   11/30/21 57.7 kg (127 lb 1.6 oz)   11/15/21 59.4 kg (131 lb)   11/11/21 60.3 kg (133 lb)   11/09/21 58.9 kg (129 lb 14.4 oz)   10/20/21 57.6 kg (127 lb)   10/19/21 58.6 kg (129 lb 1.6 oz)   10/18/21 57.7 kg (127 lb 4.8 oz)       General: Alert and oriented, in no acute distress  Rania has mild Erythema.      (Patient seen via video and not in person due to high risk Covid exposure)      Treatment Summary to Date    Aria chart and setup information reviewed    Sanam Muñoz MD      Again, thank you for allowing me to participate in the care of your patient.        Sincerely,        Sanam Muñoz MD

## 2021-12-24 ENCOUNTER — APPOINTMENT (OUTPATIENT)
Dept: RADIATION ONCOLOGY | Facility: CLINIC | Age: 36
End: 2021-12-24
Attending: RADIOLOGY
Payer: MEDICAID

## 2021-12-24 PROCEDURE — 77387 GUIDANCE FOR RADJ TX DLVR: CPT | Performed by: STUDENT IN AN ORGANIZED HEALTH CARE EDUCATION/TRAINING PROGRAM

## 2021-12-24 PROCEDURE — 77412 RADIATION TX DELIVERY LVL 3: CPT | Performed by: STUDENT IN AN ORGANIZED HEALTH CARE EDUCATION/TRAINING PROGRAM

## 2021-12-27 ENCOUNTER — APPOINTMENT (OUTPATIENT)
Dept: RADIATION ONCOLOGY | Facility: CLINIC | Age: 36
End: 2021-12-27
Attending: RADIOLOGY
Payer: MEDICAID

## 2021-12-27 PROCEDURE — 77290 THER RAD SIMULAJ FIELD CPLX: CPT | Mod: 26 | Performed by: RADIOLOGY

## 2021-12-27 PROCEDURE — 77290 THER RAD SIMULAJ FIELD CPLX: CPT | Performed by: RADIOLOGY

## 2021-12-27 PROCEDURE — 77412 RADIATION TX DELIVERY LVL 3: CPT | Performed by: RADIOLOGY

## 2021-12-27 PROCEDURE — 77387 GUIDANCE FOR RADJ TX DLVR: CPT | Performed by: RADIOLOGY

## 2021-12-28 ENCOUNTER — APPOINTMENT (OUTPATIENT)
Dept: RADIATION ONCOLOGY | Facility: CLINIC | Age: 36
End: 2021-12-28
Attending: RADIOLOGY
Payer: MEDICAID

## 2021-12-28 PROCEDURE — 77300 RADIATION THERAPY DOSE PLAN: CPT | Mod: 26 | Performed by: RADIOLOGY

## 2021-12-28 PROCEDURE — 77387 GUIDANCE FOR RADJ TX DLVR: CPT | Performed by: RADIOLOGY

## 2021-12-28 PROCEDURE — 77334 RADIATION TREATMENT AID(S): CPT | Mod: 26 | Performed by: RADIOLOGY

## 2021-12-28 PROCEDURE — 77295 3-D RADIOTHERAPY PLAN: CPT | Mod: 26 | Performed by: RADIOLOGY

## 2021-12-28 PROCEDURE — 77334 RADIATION TREATMENT AID(S): CPT | Performed by: RADIOLOGY

## 2021-12-28 PROCEDURE — 77300 RADIATION THERAPY DOSE PLAN: CPT | Performed by: RADIOLOGY

## 2021-12-28 PROCEDURE — 77295 3-D RADIOTHERAPY PLAN: CPT | Performed by: RADIOLOGY

## 2021-12-28 PROCEDURE — 77412 RADIATION TX DELIVERY LVL 3: CPT | Performed by: RADIOLOGY

## 2021-12-29 ENCOUNTER — APPOINTMENT (OUTPATIENT)
Dept: RADIATION ONCOLOGY | Facility: CLINIC | Age: 36
End: 2021-12-29
Attending: RADIOLOGY
Payer: MEDICAID

## 2021-12-29 PROCEDURE — 77336 RADIATION PHYSICS CONSULT: CPT | Performed by: RADIOLOGY

## 2021-12-29 PROCEDURE — 77387 GUIDANCE FOR RADJ TX DLVR: CPT | Performed by: RADIOLOGY

## 2021-12-29 PROCEDURE — 77412 RADIATION TX DELIVERY LVL 3: CPT | Performed by: RADIOLOGY

## 2021-12-29 PROCEDURE — 77427 RADIATION TX MANAGEMENT X5: CPT | Performed by: RADIOLOGY

## 2021-12-30 ENCOUNTER — OFFICE VISIT (OUTPATIENT)
Dept: RADIATION ONCOLOGY | Facility: CLINIC | Age: 36
End: 2021-12-30
Attending: RADIOLOGY
Payer: MEDICAID

## 2021-12-30 VITALS
DIASTOLIC BLOOD PRESSURE: 64 MMHG | SYSTOLIC BLOOD PRESSURE: 102 MMHG | TEMPERATURE: 98.4 F | WEIGHT: 127.3 LBS | RESPIRATION RATE: 16 BRPM | BODY MASS INDEX: 24.86 KG/M2 | HEART RATE: 77 BPM | OXYGEN SATURATION: 100 %

## 2021-12-30 DIAGNOSIS — Z17.0 MALIGNANT NEOPLASM OF NIPPLE OF RIGHT BREAST IN FEMALE, ESTROGEN RECEPTOR POSITIVE (H): Primary | ICD-10-CM

## 2021-12-30 DIAGNOSIS — C50.011 MALIGNANT NEOPLASM OF NIPPLE OF RIGHT BREAST IN FEMALE, ESTROGEN RECEPTOR POSITIVE (H): Primary | ICD-10-CM

## 2021-12-30 PROCEDURE — 77412 RADIATION TX DELIVERY LVL 3: CPT | Performed by: RADIOLOGY

## 2021-12-30 PROCEDURE — 77387 GUIDANCE FOR RADJ TX DLVR: CPT | Performed by: RADIOLOGY

## 2021-12-30 NOTE — LETTER
12/30/2021         RE: Joanne Colon  [address] [place] [address]        Dear Colleague,    Thank you for referring your patient, Joanne Colon, to the [facility] RADIATION ONCOLOGY [place]. Please see a copy of my visit note below.         RADIATION ONCOLOGY WEEKLY TREATMENT VISIT NOTE    Assessment:   No diagnosis found.     Impression/Plan:   36 year old Urdu speaking female S/P TCHP for triple positive right breast IDC. On US measured 2.2cm with single node measuring 1.7 cm. Pathology IDC grade III, axillary LN positive for disease. Post-chemo lumpectomy with no evidence for in situ or invasive disease, 0/2 SLN pos.     1. Continue radiation therapy as prescribed.     2. Tolerating radiation therapy well. Use Radiaplex x3 + Aquaphor on nursing pads to help with skin reaction.     3. ROM exercises for next 4-5 months.     4. Follow up prn     5. Long term follow up with medical oncology.     Patient preferred sister as Urdu .     Radiation: Site: Right Breast  Stereotactic Radiosurgery: No  Today's Dose: 4240  Total Dose for Breast: 5240  Today's Fraction/Total Fraction Breast: 16/20    Subjective:     HPI: Jaonne Colon is a 36 year old female with Data Unavailable      The following portions of the patient's history were reviewed and updated as appropriate: allergies, current medications, past family history, past medical history, past social history, past surgical history and problem list.    Assessment   Body Site:  Breast                           Site: Right Breast  Stereotactic Radiosurgery: No  Today's Dose: 4240  Total Dose for Breast: 5240  Today's Fraction/Total Fraction Breast: 16/20  Drainage: 0: Absent                                     Sexuality Alteration                    Emotional Alteration       Comfort Alteration       Nutrition Alteration      Skin Alteration      AUA Assessment                                           Accompanied by        Objective:     Exam:     There were no vitals filed for this visit.    Wt Readings from Last 8 Encounters:   12/23/21 58.6 kg (129 lb 1.6 oz)   12/22/21 57.6 kg (127 lb)   11/30/21 57.7 kg (127 lb 1.6 oz)   11/15/21 59.4 kg (131 lb)   11/11/21 60.3 kg (133 lb)   11/09/21 58.9 kg (129 lb 14.4 oz)   10/20/21 57.6 kg (127 lb)   10/19/21 58.6 kg (129 lb 1.6 oz)       General: Alert and oriented. Sitting comfortably.   Patient non acute appearing, asymptomatic. Mask on. No cough, no respiratory distress.   Rania has grade II erythema.    Treatment Summary to Date    Aria chart and setup information reviewed    I, Sanam Christensen MD personally performed the services described in this documentation, as scribed by Dallas Sheldon in my presence, and it is both accurate and complete.    Signed by: Sanam Christensen MD, MPH         Again, thank you for allowing me to participate in the care of your patient.        Sincerely,        Sanam Christensen MD

## 2021-12-30 NOTE — PROGRESS NOTES
RADIATION ONCOLOGY WEEKLY TREATMENT VISIT NOTE    Assessment:   No diagnosis found.     Impression/Plan:   36 year old Serbian speaking female S/P TCHP for triple positive right breast IDC. On US measured 2.2cm with single node measuring 1.7 cm. Pathology IDC grade III, axillary LN positive for disease. Post-chemo lumpectomy with no evidence for in situ or invasive disease, 0/2 SLN pos.     1. Continue radiation therapy as prescribed.     2. Tolerating radiation therapy well. Use Radiaplex x3 + Aquaphor on nursing pads to help with skin reaction.     3. ROM exercises for next 4-5 months.     4. Follow up prn     5. Long term follow up with medical oncology.     Patient preferred sister as Serbian .     Radiation: Site: Right Breast  Stereotactic Radiosurgery: No  Today's Dose: 4240  Total Dose for Breast: 5240  Today's Fraction/Total Fraction Breast: 16/20    Subjective:     HPI: Joanne Colon is a 36 year old female with Data Unavailable      The following portions of the patient's history were reviewed and updated as appropriate: allergies, current medications, past family history, past medical history, past social history, past surgical history and problem list.    Assessment   Body Site:  Breast                           Site: Right Breast  Stereotactic Radiosurgery: No  Today's Dose: 4240  Total Dose for Breast: 5240  Today's Fraction/Total Fraction Breast: 16/20  Drainage: 0: Absent                                     Sexuality Alteration                    Emotional Alteration       Comfort Alteration       Nutrition Alteration      Skin Alteration      AUA Assessment                                           Accompanied by       Objective:     Exam:     There were no vitals filed for this visit.    Wt Readings from Last 8 Encounters:   12/23/21 58.6 kg (129 lb 1.6 oz)   12/22/21 57.6 kg (127 lb)   11/30/21 57.7 kg (127 lb 1.6 oz)   11/15/21 59.4 kg (131 lb)   11/11/21 60.3 kg (133  lb)   11/09/21 58.9 kg (129 lb 14.4 oz)   10/20/21 57.6 kg (127 lb)   10/19/21 58.6 kg (129 lb 1.6 oz)       General: Alert and oriented. Sitting comfortably.   Patient non acute appearing, asymptomatic. Mask on. No cough, no respiratory distress.   Rania has grade II erythema.    Treatment Summary to Date    Aria chart and setup information reviewed    I, Sanam Christensen MD personally performed the services described in this documentation, as scribed by Dallas Sheldon in my presence, and it is both accurate and complete.    Signed by: Sanam Christensen MD, MPH

## 2021-12-31 ENCOUNTER — APPOINTMENT (OUTPATIENT)
Dept: RADIATION ONCOLOGY | Facility: CLINIC | Age: 36
End: 2021-12-31
Attending: RADIOLOGY
Payer: MEDICAID

## 2021-12-31 PROCEDURE — 77387 GUIDANCE FOR RADJ TX DLVR: CPT | Performed by: RADIOLOGY

## 2021-12-31 PROCEDURE — 77412 RADIATION TX DELIVERY LVL 3: CPT | Performed by: RADIOLOGY

## 2022-01-03 ENCOUNTER — APPOINTMENT (OUTPATIENT)
Dept: RADIATION ONCOLOGY | Facility: CLINIC | Age: 37
End: 2022-01-03
Attending: RADIOLOGY
Payer: MEDICAID

## 2022-01-03 PROCEDURE — 77387 GUIDANCE FOR RADJ TX DLVR: CPT | Performed by: RADIOLOGY

## 2022-01-03 PROCEDURE — 77412 RADIATION TX DELIVERY LVL 3: CPT | Performed by: RADIOLOGY

## 2022-01-04 ENCOUNTER — VIRTUAL VISIT (OUTPATIENT)
Dept: FAMILY MEDICINE | Facility: CLINIC | Age: 37
End: 2022-01-04
Payer: MEDICAID

## 2022-01-04 ENCOUNTER — APPOINTMENT (OUTPATIENT)
Dept: RADIATION ONCOLOGY | Facility: CLINIC | Age: 37
End: 2022-01-04
Attending: RADIOLOGY
Payer: MEDICAID

## 2022-01-04 DIAGNOSIS — Z20.822 COVID-19 RULED OUT: Primary | ICD-10-CM

## 2022-01-04 PROCEDURE — 99212 OFFICE O/P EST SF 10 MIN: CPT | Mod: 95 | Performed by: FAMILY MEDICINE

## 2022-01-04 PROCEDURE — 77387 GUIDANCE FOR RADJ TX DLVR: CPT | Performed by: RADIOLOGY

## 2022-01-04 PROCEDURE — 77412 RADIATION TX DELIVERY LVL 3: CPT | Performed by: RADIOLOGY

## 2022-01-04 NOTE — PROGRESS NOTES
Joanne is a 36 year old who is being evaluated via a billable video visit.      How would you like to obtain your AVS? MyChart  If the video visit is dropped, the invitation should be resent by: Text to cell phone: n/a patient present.  Will anyone else be joining your video visit? No     Video Start Time: 10:44 AM    Assessment & Plan     COVID-19 ruled out  Patient is currently asymptomatic she has been vaccinated she needs negative Covid PCR test prior to travel, test ordered as below.  - Asymptomatic COVID-19 Virus (Coronavirus) by PCR; Future     No follow-ups on file.    Ye Lira MD  [facility]    Richie Rubio is a 36 year old who presents for the following health issues  The patient is going to be traveling to [place] on January 10 leaving at about 2 PM in the afternoon, she needs a PCR Covid test prior to this, she does not know the exact hours I presume it has to be within 72 hours possibly even 48 but no more than 72 hours prior to travel.  She would prefer to have it done on Saturday, January 8 all specify this though I told the patient I cannot guarantee that but will put that order in.    She has no symptoms whatsoever she is feeling perfectly well and healthy she did receive the Covid vaccines back in the spring but has not received a booster shot.    She is flying to [place] but then she is going to be on the [place] for perhaps a month or so.       Review of Systems   Constitutional, HEENT, cardiovascular, pulmonary, gi and gu systems are negative, except as otherwise noted.      Objective           Vitals:  No vitals were obtained today due to virtual visit.    Physical Exam   GENERAL: Healthy, alert and no distress  EYES: Eyes grossly normal to inspection.  No discharge or erythema, or obvious scleral/conjunctival abnormalities.  RESP: No audible wheeze, cough, or visible cyanosis.  No visible retractions or increased work of breathing.    SKIN:  Visible skin clear. No significant rash, abnormal pigmentation or lesions.  NEURO: Cranial nerves grossly intact.  Mentation and speech appropriate for age.  PSYCH: Mentation appears normal, affect normal/bright, judgement and insight intact, normal speech and appearance well-groomed.                  Video-Visit Details    Type of service:  Video Visit    Video End Time:10:49 AM    Originating Location (pt. Location): Home    Distant Location (provider location):  [facility]     Platform used for Video Visit: [employer]

## 2022-01-05 ENCOUNTER — ALLIED HEALTH/NURSE VISIT (OUTPATIENT)
Dept: RADIATION ONCOLOGY | Facility: CLINIC | Age: 37
End: 2022-01-05
Attending: INTERNAL MEDICINE
Payer: MEDICAID

## 2022-01-05 ENCOUNTER — APPOINTMENT (OUTPATIENT)
Dept: RADIATION ONCOLOGY | Facility: CLINIC | Age: 37
End: 2022-01-05
Attending: RADIOLOGY
Payer: MEDICAID

## 2022-01-05 PROCEDURE — 77336 RADIATION PHYSICS CONSULT: CPT | Performed by: RADIOLOGY

## 2022-01-05 PROCEDURE — 77387 GUIDANCE FOR RADJ TX DLVR: CPT | Performed by: RADIOLOGY

## 2022-01-05 PROCEDURE — 77412 RADIATION TX DELIVERY LVL 3: CPT | Performed by: RADIOLOGY

## 2022-01-05 PROCEDURE — 77427 RADIATION TX MANAGEMENT X5: CPT | Performed by: RADIOLOGY

## 2022-01-05 NOTE — PROGRESS NOTES
Patient here ambulatory accompanied by sister for final radiation therapy treatment for her breast cancer.  Bright erythema present.  Reinforced skin cares.  Written discharge instructions reviewed and given to patient and sister.  Follow up with Dr. Christensen as needed.  Patient will be leaving country and was given phone number for any questions or concerns.

## 2022-01-08 ENCOUNTER — LAB (OUTPATIENT)
Dept: FAMILY MEDICINE | Facility: CLINIC | Age: 37
End: 2022-01-08
Attending: FAMILY MEDICINE
Payer: MEDICAID

## 2022-01-08 DIAGNOSIS — Z20.822 COVID-19 RULED OUT: ICD-10-CM

## 2022-01-08 PROCEDURE — U0005 INFEC AGEN DETEC AMPLI PROBE: HCPCS

## 2022-01-08 PROCEDURE — U0003 INFECTIOUS AGENT DETECTION BY NUCLEIC ACID (DNA OR RNA); SEVERE ACUTE RESPIRATORY SYNDROME CORONAVIRUS 2 (SARS-COV-2) (CORONAVIRUS DISEASE [COVID-19]), AMPLIFIED PROBE TECHNIQUE, MAKING USE OF HIGH THROUGHPUT TECHNOLOGIES AS DESCRIBED BY CMS-2020-01-R: HCPCS

## 2022-01-08 PROCEDURE — 99207 PR NO CHARGE LOS: CPT

## 2022-01-08 NOTE — LETTER
January 10, 2022      Joanne PURA Isaiah  [address] [place] [address]        Dear ,    We are writing to inform you of your test results.  Fortunately the test for COVID is negative         Resulted Orders   Asymptomatic COVID-19 Virus (Coronavirus) by PCR Nose   Result Value Ref Range    SARS CoV2 PCR Negative Negative, Testing sent to reference lab. Results will be returned via unsolicited result      Comment:      NEGATIVE: SARS-CoV-2 (COVID-19) RNA not detected, presumed negative.    Narrative    Testing was performed using the Xpert Xpress SARS-CoV-2 Assay on the  Cepheid Gene-Xpert Instrument Systems. Additional information about  this Emergency Use Authorization (EUA) assay can be found via the Lab  Guide. This test should be ordered for the detection of SARS-CoV-2 in  individuals who meet SARS-CoV-2 clinical and/or epidemiological  criteria. Test performance is unknown in asymptomatic patients. This  test is for in vitro diagnostic use under the FDA EUA for  laboratories certified under CLIA to perform high complexity testing.  This test has not been FDA cleared or approved. A negative result  does not rule out the presence of PCR inhibitors in the specimen or  target RNA in concentration below the limit of detection for the  assay. The possibility of a false negative should be considered if  the patient's recent exposure or clinical presentation suggests  COVID-19. This test was validated by the [facility]. This laboratory is certified under  the Clinical Laboratory Improvement Amendments of 1988 (CLIA-88) as  qualified to perform high complexity laboratory testing.         If you have any questions or concerns, please call the clinic at the number listed above.       Sincerely,      Ye Lira MD

## 2022-01-10 ENCOUNTER — MYC MEDICAL ADVICE (OUTPATIENT)
Dept: FAMILY MEDICINE | Facility: CLINIC | Age: 37
End: 2022-01-10
Payer: MEDICAID

## 2022-01-10 ENCOUNTER — TELEPHONE (OUTPATIENT)
Dept: EMERGENCY MEDICINE | Facility: CLINIC | Age: 37
End: 2022-01-10
Payer: MEDICAID

## 2022-01-10 ENCOUNTER — INFUSION THERAPY VISIT (OUTPATIENT)
Dept: INFUSION THERAPY | Facility: CLINIC | Age: 37
End: 2022-01-10
Attending: INTERNAL MEDICINE
Payer: MEDICAID

## 2022-01-10 VITALS
OXYGEN SATURATION: 96 % | RESPIRATION RATE: 16 BRPM | HEART RATE: 82 BPM | SYSTOLIC BLOOD PRESSURE: 106 MMHG | TEMPERATURE: 98.5 F | DIASTOLIC BLOOD PRESSURE: 68 MMHG

## 2022-01-10 DIAGNOSIS — Z51.11 ENCOUNTER FOR ANTINEOPLASTIC CHEMOTHERAPY: ICD-10-CM

## 2022-01-10 DIAGNOSIS — C50.011 MALIGNANT NEOPLASM OF NIPPLE OF RIGHT BREAST IN FEMALE, ESTROGEN RECEPTOR POSITIVE (H): Primary | ICD-10-CM

## 2022-01-10 DIAGNOSIS — Z17.0 MALIGNANT NEOPLASM OF NIPPLE OF RIGHT BREAST IN FEMALE, ESTROGEN RECEPTOR POSITIVE (H): Primary | ICD-10-CM

## 2022-01-10 LAB — SARS-COV-2 RNA RESP QL NAA+PROBE: NEGATIVE

## 2022-01-10 PROCEDURE — 258N000003 HC RX IP 258 OP 636: Performed by: INTERNAL MEDICINE

## 2022-01-10 PROCEDURE — 96413 CHEMO IV INFUSION 1 HR: CPT

## 2022-01-10 PROCEDURE — 250N000011 HC RX IP 250 OP 636: Performed by: INTERNAL MEDICINE

## 2022-01-10 PROCEDURE — 96417 CHEMO IV INFUS EACH ADDL SEQ: CPT

## 2022-01-10 RX ORDER — HEPARIN SODIUM (PORCINE) LOCK FLUSH IV SOLN 100 UNIT/ML 100 UNIT/ML
5 SOLUTION INTRAVENOUS
Status: DISCONTINUED | OUTPATIENT
Start: 2022-01-10 | End: 2022-01-10 | Stop reason: HOSPADM

## 2022-01-10 RX ADMIN — HEPARIN SODIUM (PORCINE) LOCK FLUSH IV SOLN 100 UNIT/ML 5 ML: 100 SOLUTION at 09:48

## 2022-01-10 RX ADMIN — PERTUZUMAB 420 MG: 30 INJECTION, SOLUTION, CONCENTRATE INTRAVENOUS at 08:35

## 2022-01-10 RX ADMIN — TRASTUZUMAB 348 MG: 150 INJECTION, POWDER, LYOPHILIZED, FOR SOLUTION INTRAVENOUS at 09:14

## 2022-01-10 RX ADMIN — SODIUM CHLORIDE 250 ML: 9 INJECTION, SOLUTION INTRAVENOUS at 08:35

## 2022-01-10 ASSESSMENT — PAIN SCALES - GENERAL: PAINLEVEL: NO PAIN (0)

## 2022-01-10 NOTE — PROGRESS NOTES
Infusion Nursing Note:  Joanne Colon presents today for C12.    Patient seen by provider today: No   present during visit today: Yes, Language: Pt prefers family translating.     Note: Pt arrives ambulatory to [facility]fusion. Pt reports no difficulties since last cycle; pt reports some fatigue after radiation.    Intravenous Access:  Implanted Port with good blood return.    Treatment Conditions:  Lab Results   Component Value Date    HGB 12.5 11/30/2021    WBC 6.2 11/30/2021    ANEUTAUTO 3.4 11/30/2021     11/30/2021      Lab Results   Component Value Date     11/30/2021    POTASSIUM 4.0 11/30/2021    MAG 1.8 06/24/2021    CR 0.68 11/30/2021    TANNER 9.5 11/30/2021    BILITOTAL 0.3 11/30/2021    ALBUMIN 4.0 11/30/2021    ALT 11 11/30/2021    AST 15 11/30/2021     Results reviewed, labs MET treatment parameters, ok to proceed with treatment.    Post Infusion Assessment:  Patient tolerated infusion without incident.  No evidence of extravasations.  Access discontinued per protocol.     Discharge Plan:   Patient discharged in stable condition accompanied by: sister.  Departure Mode: Ambulatory.     Courtney Haji RN

## 2022-01-10 NOTE — TELEPHONE ENCOUNTER
Patient is to travel in 30 minutes and needs letter of negative Covid results.  Patient asked can I request this letter to be printed in the clinic? I am in clinic now.  Patient was advised the letter is seen in chart and this could be printed in clinic to have the hard copy for travel. Patient will ask clinic to print copy of letter. No other questions   Sun [place] LPN

## 2022-01-14 PROBLEM — Z17.0 MALIGNANT NEOPLASM OF NIPPLE OF RIGHT BREAST IN FEMALE, ESTROGEN RECEPTOR POSITIVE (H): Status: ACTIVE | Noted: 2021-04-22

## 2022-01-14 PROBLEM — C50.011 MALIGNANT NEOPLASM OF NIPPLE OF RIGHT BREAST IN FEMALE, ESTROGEN RECEPTOR POSITIVE (H): Status: ACTIVE | Noted: 2021-04-22

## 2022-01-18 VITALS
DIASTOLIC BLOOD PRESSURE: 73 MMHG | TEMPERATURE: 100.3 F | SYSTOLIC BLOOD PRESSURE: 106 MMHG | WEIGHT: 125.9 LBS | OXYGEN SATURATION: 97 % | RESPIRATION RATE: 16 BRPM | HEART RATE: 102 BPM | OXYGEN SATURATION: 98 % | BODY MASS INDEX: 24.31 KG/M2 | WEIGHT: 124.5 LBS | HEART RATE: 111 BPM | DIASTOLIC BLOOD PRESSURE: 62 MMHG | BODY MASS INDEX: 24.59 KG/M2 | TEMPERATURE: 97 F | SYSTOLIC BLOOD PRESSURE: 106 MMHG

## 2022-01-18 VITALS
DIASTOLIC BLOOD PRESSURE: 67 MMHG | HEART RATE: 101 BPM | SYSTOLIC BLOOD PRESSURE: 124 MMHG | TEMPERATURE: 98.3 F | BODY MASS INDEX: 23.68 KG/M2 | WEIGHT: 125.3 LBS | OXYGEN SATURATION: 98 %

## 2022-01-18 VITALS
WEIGHT: 124.4 LBS | OXYGEN SATURATION: 98 % | HEART RATE: 119 BPM | TEMPERATURE: 98.6 F | BODY MASS INDEX: 24.3 KG/M2 | SYSTOLIC BLOOD PRESSURE: 137 MMHG | DIASTOLIC BLOOD PRESSURE: 75 MMHG

## 2022-01-18 VITALS
TEMPERATURE: 98.4 F | SYSTOLIC BLOOD PRESSURE: 121 MMHG | OXYGEN SATURATION: 100 % | WEIGHT: 127.9 LBS | BODY MASS INDEX: 25.11 KG/M2 | HEART RATE: 75 BPM | DIASTOLIC BLOOD PRESSURE: 66 MMHG | HEIGHT: 60 IN

## 2022-01-18 VITALS — DIASTOLIC BLOOD PRESSURE: 76 MMHG | SYSTOLIC BLOOD PRESSURE: 122 MMHG | WEIGHT: 133.2 LBS

## 2022-06-21 NOTE — ANESTHESIA POSTPROCEDURE EVALUATION
Abdomen , soft, nontender, nondistended , normal bowel sounds Patient: Joanne Colon    Procedure: Procedure(s):  EXAM UNDER ANESTHESIA,    SETON placement        Diagnosis:Anal fistula [K60.3]  Diagnosis Additional Information: No value filed.    Anesthesia Type:  General    Note:  Disposition: Outpatient   Postop Pain Control: Uneventful            Sign Out: Well controlled pain   PONV: No   Neuro/Psych: Uneventful            Sign Out: Acceptable/Baseline neuro status   Airway/Respiratory: Uneventful            Sign Out: Acceptable/Baseline resp. status   CV/Hemodynamics: Uneventful            Sign Out: Acceptable CV status   Other NRE:    DID A NON-ROUTINE EVENT OCCUR? No           Last vitals:  Vitals Value Taken Time   /68 10/20/21 1945   Temp 36.7  C (98  F) 10/20/21 1945   Pulse 104 10/20/21 1958   Resp 20 10/20/21 1958   SpO2 97 % 10/20/21 1958   Vitals shown include unvalidated device data.    Electronically Signed By: Myah Maldonado  October 20, 2021  8:34 PM

## 2022-10-02 ENCOUNTER — HEALTH MAINTENANCE LETTER (OUTPATIENT)
Age: 37
End: 2022-10-02

## 2022-10-04 ENCOUNTER — PATIENT OUTREACH (OUTPATIENT)
Dept: ONCOLOGY | Facility: CLINIC | Age: 37
End: 2022-10-04

## 2022-10-04 NOTE — PROGRESS NOTES
[facility]: Cancer Care                                                                                          Faxed Emergency Medical Assistance-Care Plan Certification Request to [phone] at [phone].    Right Fax confirmed sent.     Also, faxed form to Candace Robbins, Financial Counselor at [phone] at [phone].    Right Fax confirmed sent.    Copy to HIM for scanning.    Signature:  Akila Klein RN

## 2023-02-11 ENCOUNTER — HEALTH MAINTENANCE LETTER (OUTPATIENT)
Age: 38
End: 2023-02-11

## 2024-03-09 ENCOUNTER — HEALTH MAINTENANCE LETTER (OUTPATIENT)
Age: 39
End: 2024-03-09

## 2025-03-16 ENCOUNTER — HEALTH MAINTENANCE LETTER (OUTPATIENT)
Age: 40
End: 2025-03-16

## (undated) DEVICE — DRAPE MINOR PROCEDURE LAP 29496

## (undated) DEVICE — SYR 10ML LL W/O NDL 302995

## (undated) DEVICE — COVER ULTRASOUND PROBE FLEXI-FEEL 4X96" 25-FF496

## (undated) DEVICE — PLATE GROUNDING ADULT W/CORD 9165L

## (undated) DEVICE — GOWN LG DISP 9515

## (undated) DEVICE — SOL WATER IRRIG 1000ML BOTTLE 2F7114

## (undated) DEVICE — SUCTION TIP POOLE STERILE 35040

## (undated) DEVICE — SUCTION TIP YANKAUER W/O VENT K86

## (undated) DEVICE — LINEN TOWEL PACK X5 5464

## (undated) DEVICE — SYR EAR BULB 3OZ 0035830

## (undated) DEVICE — DRSG GAUZE 4X4" 3033

## (undated) DEVICE — SUCTION MANIFOLD NEPTUNE 2 SYS 1 PORT 702-025-000

## (undated) DEVICE — SOL NACL 0.9% IRRIG 1000ML BOTTLE 2F7124

## (undated) DEVICE — LIGACLIP MEDIUM AESCULAP B2180-1

## (undated) DEVICE — SUCTION CANISTER MEDIVAC LINER 3000ML W/LID 65651-530

## (undated) DEVICE — PACK MINOR SBA15MIFSE

## (undated) DEVICE — GLOVE SURG PI ULTRA TOUCH M SZ 6-1/2 LF

## (undated) DEVICE — TUBING SUCTION MEDI-VAC 1/4"X20' N620A - HE

## (undated) DEVICE — DRSG STERI STRIP 1/2X4" R1547

## (undated) DEVICE — DRSG KERLIX FLUFFS X5

## (undated) DEVICE — GLOVE PROTEXIS W/NEU-THERA 6.5  2D73TE65

## (undated) DEVICE — CUSTOM PACK MINOR SBA5BMNHEA

## (undated) DEVICE — DRAPE CHEST 100X72X124 89227

## (undated) DEVICE — SU VICRYL+ 3-0 27IN SH UND VCP416H

## (undated) DEVICE — PREP DURAPREP 26ML APL 8630

## (undated) DEVICE — SPONGE BALL KERLIX ROUND XL W/O STRING LATEX 4935

## (undated) DEVICE — PANTIES MESH LG/XLG 2PK 706M2

## (undated) DEVICE — GLOVE PROTEXIS BLUE W/NEU-THERA 6.5  2D73EB65

## (undated) DEVICE — DECANTER VIAL 2006S

## (undated) DEVICE — SU SILK 2-0 SH 30" K833H

## (undated) DEVICE — TAPE TRANSPORE 2"X1.5YD 1534S-2

## (undated) DEVICE — NDL 25GA 2"  8881200441

## (undated) DEVICE — VESSEL LOOPS RED MAXI

## (undated) DEVICE — DRSG ABDOMINAL 07 1/2X8" 7197D

## (undated) DEVICE — ESU PENCIL W/SMOKE EVAC CVPLP2000

## (undated) DEVICE — SU SILK 0 24" TIE SA76G

## (undated) DEVICE — ESU PENCIL W/HOLSTER E2350H

## (undated) DEVICE — ESU PENCIL SMOKE EVAC W/ROCKER SWITCH 0703-047-000

## (undated) DEVICE — ESU GROUND PAD ADULT W/CORD E7507

## (undated) DEVICE — SU MONOCRYL+ 4-0 18IN PS2 UND MCP496G

## (undated) RX ORDER — FENTANYL CITRATE 0.05 MG/ML
INJECTION, SOLUTION INTRAMUSCULAR; INTRAVENOUS
Status: DISPENSED
Start: 2021-10-20

## (undated) RX ORDER — LIDOCAINE HYDROCHLORIDE 20 MG/ML
INJECTION, SOLUTION EPIDURAL; INFILTRATION; INTRACAUDAL; PERINEURAL
Status: DISPENSED
Start: 2021-10-20

## (undated) RX ORDER — ACETAMINOPHEN 325 MG/1
TABLET ORAL
Status: DISPENSED
Start: 2021-10-20

## (undated) RX ORDER — FENTANYL CITRATE 50 UG/ML
INJECTION, SOLUTION INTRAMUSCULAR; INTRAVENOUS
Status: DISPENSED
Start: 2021-10-20

## (undated) RX ORDER — PROPOFOL 10 MG/ML
INJECTION, EMULSION INTRAVENOUS
Status: DISPENSED
Start: 2021-10-20

## (undated) RX ORDER — BUPIVACAINE HYDROCHLORIDE 2.5 MG/ML
INJECTION, SOLUTION INFILTRATION; PERINEURAL
Status: DISPENSED
Start: 2021-11-15

## (undated) RX ORDER — ACETAMINOPHEN 160 MG
TABLET,DISINTEGRATING ORAL
Status: DISPENSED
Start: 2021-10-20

## (undated) RX ORDER — BUPIVACAINE HYDROCHLORIDE AND EPINEPHRINE 2.5; 5 MG/ML; UG/ML
INJECTION, SOLUTION EPIDURAL; INFILTRATION; INTRACAUDAL; PERINEURAL
Status: DISPENSED
Start: 2021-10-20